# Patient Record
Sex: FEMALE | Race: WHITE | HISPANIC OR LATINO | Employment: OTHER | URBAN - METROPOLITAN AREA
[De-identification: names, ages, dates, MRNs, and addresses within clinical notes are randomized per-mention and may not be internally consistent; named-entity substitution may affect disease eponyms.]

---

## 2019-07-16 ENCOUNTER — HOSPITAL ENCOUNTER (EMERGENCY)
Facility: HOSPITAL | Age: 64
Discharge: HOME | End: 2019-07-16
Attending: EMERGENCY MEDICINE
Payer: MEDICARE

## 2019-07-16 VITALS
TEMPERATURE: 98.5 F | RESPIRATION RATE: 19 BRPM | WEIGHT: 145 LBS | HEART RATE: 74 BPM | OXYGEN SATURATION: 96 % | BODY MASS INDEX: 30.44 KG/M2 | DIASTOLIC BLOOD PRESSURE: 88 MMHG | SYSTOLIC BLOOD PRESSURE: 160 MMHG | HEIGHT: 58 IN

## 2019-07-16 DIAGNOSIS — M65.4 DE QUERVAIN'S TENOSYNOVITIS, LEFT: Primary | ICD-10-CM

## 2019-07-16 PROCEDURE — 99283 EMERGENCY DEPT VISIT LOW MDM: CPT | Performed by: EMERGENCY MEDICINE

## 2019-07-16 PROCEDURE — 99283 EMERGENCY DEPT VISIT LOW MDM: CPT

## 2019-07-16 RX ORDER — NAPROXEN 250 MG/1
250 TABLET ORAL ONCE
Status: COMPLETED | OUTPATIENT
Start: 2019-07-16 | End: 2019-07-16

## 2019-07-16 RX ORDER — NAPROXEN 500 MG/1
500 TABLET ORAL 2 TIMES DAILY WITH MEALS
Qty: 10 TABLET | Refills: 0 | Status: SHIPPED | OUTPATIENT
Start: 2019-07-16 | End: 2019-08-22

## 2019-07-16 RX ORDER — HYDROCODONE BITARTRATE AND ACETAMINOPHEN 5; 325 MG/1; MG/1
1 TABLET ORAL ONCE
Status: COMPLETED | OUTPATIENT
Start: 2019-07-16 | End: 2019-07-16

## 2019-07-16 RX ADMIN — HYDROCODONE BITARTRATE AND ACETAMINOPHEN 1 TABLET: 5; 325 TABLET ORAL at 13:01

## 2019-07-16 RX ADMIN — NAPROXEN 250 MG: 250 TABLET ORAL at 13:01

## 2019-07-16 NOTE — ED PROVIDER NOTES
History  Chief Complaint   Patient presents with    Arm Pain     Pt reports left arm pain for the past month  Denies initial injury she can recall  Pain from forearm into thumb  This is a 68-year-old female who presents with left arm pain  Her pain is in the pathway of the 1st extensor tendon  She has tenderness along the tendon path  She has decreased ability to give a thumbs-up  Her findings are concerning for De Quervain's tenosynovitis  She admits that she does a lot of repetitive motions      Arm Pain   Associated symptoms: no abdominal pain, no congestion, no cough, no diarrhea, no ear pain, no fatigue, no fever, no headaches, no myalgias, no nausea, no rhinorrhea, no shortness of breath, no sore throat, no vomiting and no wheezing        None       Past Medical History:   Diagnosis Date    Asthma     COPD [facility]chronic obstructive pulmonary disease) ([facility] )     Diabetes mellitus ([facility] )        History reviewed  No pertinent surgical history  History reviewed  No pertinent family history  I have reviewed and agree with the history as documented  Social History     Tobacco Use    Smoking status: Current Every Day Smoker     Types: Cigarettes    Smokeless tobacco: Never Used   Substance Use Topics    Alcohol use: Never     Frequency: Never    Drug use: Never        Review of Systems   Constitutional: Negative for activity change, fatigue and fever  HENT: Negative for congestion, ear pain, rhinorrhea and sore throat  Eyes: Negative  Respiratory: Negative for cough, shortness of breath and wheezing  Gastrointestinal: Negative for abdominal pain, diarrhea, nausea and vomiting  Endocrine: Negative  Genitourinary: Negative for difficulty urinating, dyspareunia, dysuria, flank pain, frequency, menstrual problem, pelvic pain, urgency, vaginal bleeding, vaginal discharge and vaginal pain  Musculoskeletal: Negative for arthralgias and myalgias     Skin: Negative for color change and pallor  Neurological: Negative for dizziness, speech difficulty, weakness and headaches  Hematological: Negative for adenopathy  Psychiatric/Behavioral: Negative for confusion  Physical Exam  Physical Exam   Constitutional: She is oriented to person, place, and time  She appears well-developed and well-nourished  She is cooperative  Non-toxic appearance  She does not have a sickly appearance  She does not appear ill  No distress  HENT:   Head: Normocephalic and atraumatic  Right Ear: Tympanic membrane and external ear normal    Left Ear: Tympanic membrane and external ear normal    Nose: No rhinorrhea, sinus tenderness or nasal deformity  No epistaxis  Right sinus exhibits no maxillary sinus tenderness and no frontal sinus tenderness  Left sinus exhibits no maxillary sinus tenderness and no frontal sinus tenderness  Mouth/Throat: Oropharynx is clear and moist and mucous membranes are normal  Normal dentition  Eyes: Pupils are equal, round, and reactive to light  EOM are normal    Neck: Normal range of motion  Neck supple  Cardiovascular: Normal rate, regular rhythm and normal heart sounds  No murmur heard  Pulmonary/Chest: Effort normal and breath sounds normal  No accessory muscle usage  No respiratory distress  She has no wheezes  She has no rales  She exhibits no tenderness  Abdominal: Soft  She exhibits no distension  There is no guarding  Musculoskeletal: Normal range of motion  She exhibits no edema  Left wrist: She exhibits tenderness (over the left first extensor tendon)  Lymphadenopathy:     She has no cervical adenopathy  Neurological: She is alert and oriented to person, place, and time  She exhibits normal muscle tone  Skin: Skin is warm and dry  No rash noted  No erythema  Psychiatric: She has a normal mood and affect  Nursing note and vitals reviewed        Vital Signs  ED Triage Vitals [07/16/19 1046]   Temperature Pulse Respirations Blood Pressure SpO2 98 5 °F (36 9 °C) 95 16 147/68 98 %      Temp Source Heart Rate Source Patient Position - Orthostatic VS BP Location FiO2 (%)   Oral Monitor Sitting Left arm --      Pain Score       Worst Possible Pain           Vitals:    07/16/19 1046 07/16/19 1333   BP: 147/68 160/88   Pulse: 95 74   Patient Position - Orthostatic VS: Sitting Sitting         Visual Acuity      ED Medications  Medications   naproxen (NAPROSYN) tablet 250 mg (250 mg Oral Given 7/16/19 1301)   HYDROcodone-acetaminophen (NORCO) 5-325 mg per tablet 1 tablet (1 tablet Oral Given 7/16/19 1301)       Diagnostic Studies  Results Reviewed     None                 No orders to display              Procedures  Procedures       ED Course                               MDM  Number of Diagnoses or Management Options  De Quervain's tenosynovitis, left: new and requires workup  Diagnosis management comments: No need for imaging in the absence of trauma  This has been an ongoing problem slowly worsening  Follow up recommendations made  Patient Progress  Patient progress: stable      Disposition  Final diagnoses:   De Quervain's tenosynovitis, left     Time reflects when diagnosis was documented in both MDM as applicable and the Disposition within this note     Time User Action Codes Description Comment    7/16/2019  1:03 PM Michaela Paz Add [M65 4] De Quervain's tenosynovitis, left       ED Disposition     ED Disposition Condition Date/Time Comment    Discharge Stable Tue Jul 16, 2019  1:00 PM Johanne Men discharge to home/self care              Follow-up Information     Follow up With Specialties Details Why Contact Info    Sakshi Richards MD Orthopedic Surgery, Orthopedics Schedule an appointment as soon as possible for a visit  For Recheck [address] [phone]            Discharge Medication List as of 7/16/2019  1:05 PM      START taking these medications    Details   naproxen (NAPROSYN) 500 mg tablet Take 1 tablet (500 mg total) by mouth 2 (two) times a day with meals for 5 days, Starting Tue 7/16/2019, Until Sun 7/21/2019, Print               ED Provider  Electronically Signed by           Raimundo Garcia  07/16/19 4211

## 2019-08-22 ENCOUNTER — OFFICE VISIT (OUTPATIENT)
Dept: OBGYN CLINIC | Facility: CLINIC | Age: 64
End: 2019-08-22
Payer: MEDICARE

## 2019-08-22 VITALS
HEART RATE: 90 BPM | BODY MASS INDEX: 31.36 KG/M2 | WEIGHT: 149.4 LBS | SYSTOLIC BLOOD PRESSURE: 136 MMHG | HEIGHT: 58 IN | DIASTOLIC BLOOD PRESSURE: 82 MMHG

## 2019-08-22 DIAGNOSIS — M65.4 DE QUERVAIN'S TENOSYNOVITIS, LEFT: Primary | ICD-10-CM

## 2019-08-22 PROCEDURE — 99203 OFFICE O/P NEW LOW 30 MIN: CPT | Performed by: ORTHOPAEDIC SURGERY

## 2019-08-22 PROCEDURE — 20550 NJX 1 TENDON SHEATH/LIGAMENT: CPT | Performed by: ORTHOPAEDIC SURGERY

## 2019-08-22 RX ORDER — IBUPROFEN 800 MG/1
TABLET ORAL
COMMUNITY

## 2019-08-22 RX ORDER — LIDOCAINE HYDROCHLORIDE 10 MG/ML
0.5 INJECTION, SOLUTION INFILTRATION; PERINEURAL
Status: COMPLETED | OUTPATIENT
Start: 2019-08-22 | End: 2019-08-22

## 2019-08-22 RX ORDER — PREDNISONE 10 MG/1
1 TABLET ORAL 2 TIMES DAILY
COMMUNITY

## 2019-08-22 RX ORDER — LEVOFLOXACIN 500 MG/1
TABLET, FILM COATED ORAL
Status: ON HOLD | COMMUNITY
End: 2020-01-10 | Stop reason: ALTCHOICE

## 2019-08-22 RX ORDER — LEVOCETIRIZINE DIHYDROCHLORIDE 5 MG/1
TABLET, FILM COATED ORAL
COMMUNITY
Start: 2018-08-23

## 2019-08-22 RX ORDER — MONTELUKAST SODIUM 10 MG/1
TABLET ORAL
COMMUNITY
Start: 2019-06-06

## 2019-08-22 RX ORDER — INSULIN ASPART 100 [IU]/ML
INJECTION, SUSPENSION SUBCUTANEOUS
COMMUNITY

## 2019-08-22 RX ORDER — ALBUTEROL SULFATE 90 MCG
HFA AEROSOL WITH ADAPTER (GRAM) INHALATION
Refills: 0 | COMMUNITY
Start: 2019-07-31

## 2019-08-22 RX ORDER — BUDESONIDE AND FORMOTEROL FUMARATE DIHYDRATE 160; 4.5 UG/1; UG/1
AEROSOL RESPIRATORY (INHALATION)
Refills: 0 | COMMUNITY
Start: 2019-07-22

## 2019-08-22 RX ORDER — TRIAMCINOLONE ACETONIDE 40 MG/ML
20 INJECTION, SUSPENSION INTRA-ARTICULAR; INTRAMUSCULAR
Status: COMPLETED | OUTPATIENT
Start: 2019-08-22 | End: 2019-08-22

## 2019-08-22 RX ADMIN — LIDOCAINE HYDROCHLORIDE 0.5 ML: 10 INJECTION, SOLUTION INFILTRATION; PERINEURAL at 09:24

## 2019-08-22 RX ADMIN — TRIAMCINOLONE ACETONIDE 20 MG: 40 INJECTION, SUSPENSION INTRA-ARTICULAR; INTRAMUSCULAR at 09:24

## 2019-08-22 NOTE — PROGRESS NOTES
CHIEF COMPLAINT:  Chief Complaint   Patient presents with    Left Hand - Pain       SUBJECTIVE:  Farrukh Lux is a 59y o  year old RHD female who presents to the office for evaluation of her left wrist  Pt states that she has been having pain on the radial aspect of her left wrist for greater than 2 months  Pt states that the pain increases with motion and lifting  Pt was seen in the ED on 7/16/19 where she was prescribed naproxen  Pt also has callus formation on the ulnar aspect of the long finger nail that has been present for about 1 month  Pt states that it causes her discomfort when she bumps it  Pt is insulin dependant diabetic  PAST MEDICAL HISTORY:  Past Medical History:   Diagnosis Date    Asthma     [facility]ronic [facility]     [facility]        PAST SURGICAL HISTORY:  No past surgical history on file  FAMILY HISTORY:  No family history on file  SOCIAL HISTORY:  Social History     Tobacco Use    Smoking status: Current Every Day Smoker     Types: Cigarettes    Smokeless tobacco: Never Used   Substance Use Topics    Alcohol use: Never     Frequency: Never    Drug use: Never       MEDICATIONS:  No current outpatient medications on file  ALLERGIES:  Allergies   Allergen Reactions    Cephalexin     Clindamycin Hives    Sulfamethoxazole-Trimethoprim Hives       REVIEW OF SYSTEMS:  Review of Systems   Constitutional: Negative for chills, fever and unexpected weight change  HENT: Negative for hearing loss, nosebleeds and sore throat  Eyes: Negative for pain, redness and visual disturbance  Respiratory: Negative for cough, shortness of breath and wheezing  Cardiovascular: Negative for chest pain, palpitations and leg swelling  Gastrointestinal: Negative for abdominal pain, nausea and vomiting  Endocrine: Negative for polydipsia and polyuria  Genitourinary: Negative for dysuria and hematuria  Skin: Negative for rash and wound  Neurological: Negative for dizziness, numbness and headaches  Psychiatric/Behavioral: Negative for decreased concentration, dysphoric mood and suicidal ideas  The patient is not nervous/anxious  VITALS:  There were no vitals filed for this visit  LABS:  HgA1c: No results found for: HGBA1C  BMP: No results found for: GLUCOSE, CALCIUM, NA, K, CO2, CL, BUN, CREATININE    _____________________________________________________  PHYSICAL EXAMINATION:  General: well developed and well nourished, alert, oriented times 3 and appears comfortable  Psychiatric: Normal  HEENT: Trachea Midline, No torticollis  Pulmonary: No audible wheezing or respiratory distress   Skin: No masses, erythema, lacerations, fluctation, ulcerations,   Neurovascular: Sensation Intact to the Median, Ulnar, Radial Nerve, Motor Intact to the Median, Ulnar, Radial Nerve and Pulses Intact    MUSCULOSKELETAL EXAMINATION:  De Quervain's Tenosynovitis Exam:  left side    Positive tender to palpation over 1st dorsal extensor compartment   Positive palpable nodule  Positive crepitus over 1st dorsal extensor compartment   Positive Finkelstein's test    Positive pain with resisted abduction of the thumb      ___________________________________________________  STUDIES REVIEWED:  No studies reviewed         PROCEDURES PERFORMED:  Hand/upper extremity injection: L extensor compartment 1  Date/Time: 8/22/2019 9:24 AM  Consent given by: patient  Site marked: site marked  Timeout: Immediately prior to procedure a time out was called to verify the correct patient, procedure, equipment, support staff and site/side marked as required   Supporting Documentation  Indications: pain   Procedure Details  Condition:de Quervain's tenosynovitis Site: L extensor compartment 1   Preparation: Patient was prepped and draped in the usual sterile fashion  Ultrasound guidance: no  Medications administered: 0 5 mL lidocaine 1 %; 20 mg triamcinolone acetonide 40 mg/mL    Patient tolerance: patient tolerated the procedure well with no immediate complications  Dressing:  Sterile dressing applied             _____________________________________________________  ASSESSMENT/PLAN:    Left deQuervain tenosynovitis   * CSI was offered and accepted by patient  * CSI was administered today without complications   * Pt was advised that the CSI could increase her blood glucose, she was advised to closely monitor her glucose levels  * Pt will follow up in the office in 2 weeks     Left long finger ulnar sided callus  at nail boarder  * Pt was advised to soak 2x a day and remove callus to work out likely hang nail         Follow Up:  Return in about 2 weeks (around 9/5/2019)  To Do Next Visit:  Re-evaluation of current issue    General Discussions:  Kayla Owens Tenosynovitis: The anatomy and physiology of de Quervain's tenosynovitis was discussed with the patient today in the office  Edema and increased contact pressure within the first dorsal extensor compartment at the radial styloid can cause pain, crepitation, and limitation of function  Treatment options include resting thumb spica splints to decrease edema, oral anti-inflammatory medications, home or formal therapy exercises, up to 2 steroid injections within the first dorsal extensor compartment, or surgical release  While majority of patients do respond to conservative treatment, up to 20% may require surgical release              Scribe Attestation    I,:   Ketan Tesfaye am acting as a scribe while in the presence of the attending physician :        I,:   Dorota Okeefe MD personally performed the services described in this documentation    as scribed in my presence :

## 2019-11-21 ENCOUNTER — OFFICE VISIT (OUTPATIENT)
Dept: OBGYN CLINIC | Facility: CLINIC | Age: 64
End: 2019-11-21
Payer: MEDICARE

## 2019-11-21 ENCOUNTER — APPOINTMENT (OUTPATIENT)
Dept: RADIOLOGY | Facility: CLINIC | Age: 64
End: 2019-11-21
Payer: MEDICARE

## 2019-11-21 VITALS
BODY MASS INDEX: 31.36 KG/M2 | DIASTOLIC BLOOD PRESSURE: 83 MMHG | WEIGHT: 149.4 LBS | HEART RATE: 90 BPM | HEIGHT: 58 IN | SYSTOLIC BLOOD PRESSURE: 146 MMHG

## 2019-11-21 DIAGNOSIS — M79.642 HAND PAIN, LEFT: ICD-10-CM

## 2019-11-21 DIAGNOSIS — M65.4 DE QUERVAIN'S TENOSYNOVITIS, LEFT: ICD-10-CM

## 2019-11-21 DIAGNOSIS — M79.642 HAND PAIN, LEFT: Primary | ICD-10-CM

## 2019-11-21 PROCEDURE — 99213 OFFICE O/P EST LOW 20 MIN: CPT | Performed by: ORTHOPAEDIC SURGERY

## 2019-11-21 PROCEDURE — 73130 X-RAY EXAM OF HAND: CPT

## 2019-11-21 RX ORDER — HYDROCODONE BITARTRATE AND ACETAMINOPHEN 5; 325 MG/1; MG/1
1 TABLET ORAL EVERY 6 HOURS PRN
Qty: 20 TABLET | Refills: 0 | Status: SHIPPED | OUTPATIENT
Start: 2019-11-21 | End: 2020-01-21

## 2019-11-21 NOTE — H&P
CHIEF COMPLAINT:  Chief Complaint   Patient presents with    Left Hand - Follow-up       SUBJECTIVE:  Tammie Lopez is a 59y o  year old RHD female who presents for follow-up regarding left hand pain  Patient states that the cortisone injection that was administered for left de Quervain tenosynovitis did not help back in August   She also is complaining of a cyst over the dorsal aspect of her wrist and over the distal radius  She states that they are painful  She would like to forego any cortisone injections at this time and would like to proceed with surgical intervention for release of her de Quervain tenosynovitis as well as removal of the cyst   She denies any numbness or tingling  The patient denies any cardiac  Patient has a history of COPD  Denies diabetes  Denies any history of MI, gastric ulcers, kidney or liver issues  Denies blood thinners  PAST MEDICAL HISTORY:  Past Medical History:   Diagnosis Date    Asthma     [facility]     Diabetes mellitus ([facility] )        PAST SURGICAL HISTORY:  History reviewed  No pertinent surgical history  FAMILY HISTORY:  History reviewed  No pertinent family history      SOCIAL HISTORY:  Social History     Tobacco Use    Smoking status: Current Every Day Smoker     Types: Cigarettes    Smokeless tobacco: Never Used   Substance Use Topics    Alcohol use: Never     Frequency: Never    Drug use: Never       MEDICATIONS:    Current Outpatient Medications:     benralizumab (FASENRA) subcutaneous injection, Inject 30 mg under the skin, Disp: , Rfl:     HYDROcodone-acetaminophen (NORCO) 5-325 mg per tablet, Take 1 tablet by mouth every 6 (six) hours as needed for painMax Daily Amount: 4 tablets, Disp: 20 tablet, Rfl: 0    ibuprofen (MOTRIN) 800 mg tablet, ibuprofen 800 mg tablet, Disp: , Rfl:     insulin aspart protamine-insulin aspart (NOVOLOG MIX 70/30) 100 units/mL injection, Novolog Mix 70-30 U-100 Insulin 100 unit/mL subcutaneous solution, Disp: , Rfl:     levocetirizine (XYZAL) 5 MG tablet, levocetirizine 5 mg tablet, Disp: , Rfl:     levofloxacin (LEVAQUIN) 500 mg tablet, levofloxacin 500 mg tablet, Disp: , Rfl:     liraglutide (VICTOZA) injection, Victoza 2-Jae 0 6 mg/0 1 mL (18 mg/3 mL) subcutaneous pen injector, Disp: , Rfl:     montelukast (SINGULAIR) 10 mg tablet, montelukast 10 mg tablet, Disp: , Rfl:     predniSONE 10 mg tablet, Take 1 tablet by mouth 2 (two) times a day, Disp: , Rfl:     PROVENTIL  (90 Base) MCG/ACT inhaler, , Disp: , Rfl: 0    SYMBICORT 160-4 5 MCG/ACT inhaler, , Disp: , Rfl: 0    ALLERGIES:  Allergies   Allergen Reactions    Cephalexin     Clindamycin Hives    Sulfamethoxazole-Trimethoprim Hives       REVIEW OF SYSTEMS:  Review of Systems  ROS:   General: no fever, no chills  HEENT:  No loss of hearing or eyesight problems  Eyes:  No red eyes  Respiratory:  No coughing, shortness of breath or wheezing  Cardiovascular:  No chest pain, no palpitations  GI:  Abdomen soft nontender, denies nausea  Endocrine:  No muscle weakness, no frequent urination, no excessive thirst  Urinary:  No dysuria, no incontinence  Musculoskeletal: see HPI and PE  SKIN:  No skin rash, no dry skin  Neurological:  No headaches, no confusion  Psychiatric:  No suicide thoughts, no anxiety, no depression  Review of all other systems is negative    VITALS:  Vitals:    11/21/19 1109   BP: 146/83   Pulse: 90       LABS:  HgA1c: No results found for: HGBA1C  BMP: No results found for: GLUCOSE, CALCIUM, NA, K, CO2, CL, BUN, CREATININE    _____________________________________________________  PHYSICAL EXAMINATION:  General: well developed and well nourished, alert, oriented times 3 and appears comfortable  Psychiatric: Normal  HEENT: Trachea Midline, No torticollis  Heart:  Regular rate and rhythm  Lungs:   Audible wheezes appreciated on expiration diffusely throughout  Pulmonary: No audible wheezing or respiratory distress   Skin: No masses, erythema, lacerations, fluctation, ulcerations  Neurovascular: Sensation Intact to the Median, Ulnar, Radial Nerve, Motor Intact to the Median, Ulnar, Radial Nerve and Pulses Intact    MUSCULOSKELETAL EXAMINATION:  De Quervain's Tenosynovitis Exam:  left side    Positive tender to palpation over 1st dorsal extensor compartment   Positive palpable nodule  Positive crepitus over 1st dorsal extensor compartment   Positive Finkelstein's test    Positive pain with resisted abduction of the thumb    Cyst is appreciated over the dorsal aspect of the wrist    ___________________________________________________  STUDIES REVIEWED:  No studies reviewed  PROCEDURES PERFORMED:  Procedures  No Procedures performed today    _____________________________________________________  ASSESSMENT/PLAN:    Left de Quervain tenosynovitis, left wrist dorsal ganglion cyst, left distal radius cyst  - patient would like to forego any type of cortisone injections at this time  She would like to proceed with surgical intervention for removal of her cyst as well as de Quervain tenosynovitis release  Detail consent was obtained today   Surgery: left de Quervain release, left dorsal wrist ganglion cyst excision, left distal radius cyst excision   Anesthesia:  IV sedation   Antibiotics:  None   Medical clearance: needed: Pulmonary   Hand therapy: ordered   Consent: obtained   Post op pain medication sent to pharmacy today    Surgery medication instructions: You will stop eating and drinking at midnight the night before your surgery, but you may continue to take your normal medications with a small sip of water      In the morning on the day of your surgery, we would like you to take the following medications (as long as you have never been told to avoid Tylenol or NSAIDs like ibuprofen, Naproxen, Aleve, Advil, etc):   Ibuprofen 600mg one tablet by mouth   Tylenol 500mg one tablet by mouth    After surgery, we would like you to take Ibuprofen 600mg one tablet by mouth every 6 hours with food (at breakfast, lunch and dinner)  AND Tylenol 500 mg one tablet by mouth every 6 hours  (at breakfast, lunch and dinner) for 5-7 days after your surgery  Please take these medication EVERYDAY after surgery for 5-7 days, and not just as needed  You can take these medications at the same time  Taking these medications after surgery will limit your need for prescription pain medication  We will also prescribe a narcotic pain medication for a limited time after surgery that you can take as needed for moderate or severe pain  Diagnoses and all orders for this visit:    Hand pain, left  -     XR hand 3+ vw left; Future    De Quervain's tenosynovitis, left  -     Ambulatory referral to PT/OT hand therapy; Future  -     HYDROcodone-acetaminophen (NORCO) 5-325 mg per tablet; Take 1 tablet by mouth every 6 (six) hours as needed for painMax Daily Amount: 4 tablets  -     Case request operating room: RELEASE DEQUERVAINS left, Left dorsal wrist ganglion cyst excision, left distal radius cyst excision; Standing    Other orders  -     Diet NPO; Sips with meds; Standing  -     Height and weight upon arrival; Standing  -     Void on call to OR; Standing  -     Insert peripheral IV; Standing        Follow Up:  Return for post op   Work/school status:  No restrictions    To Do Next Visit:  Re-evaluation of current issue and Sutures out    Operative Discussions:  Luly Singh Release: The anatomy and physiology of de Quervain's tenosynovitis was discussed with the patient today in the office  Edema and increased contact pressure within the first dorsal extensor compartment at the radial styloid can cause pain, crepitation, and limitation of function    Treatment options include resting thumb spica splints to decrease edema, oral anti-inflammatory medications, home or formal therapy exercises, up to 2 steroid injections within the first dorsal extensor compartment, or surgical release  While majority of patients do respond to conservative treatment, up to 20% may require surgical release  The patient has elected to undergo a release of the first dorsal extensor compartment (de Quervain's)  A small incision will be made over the radial styloid of the wrist, the tendon sheath holding the abductor pollicis longus and extensor pollicis brevis will be released  In the postoperative period, light activities are allowed immediately, driving is allowed when narcotic medication has stopped, and the incision may get wet after 2 days  Heavy activities (lifting more than approximately 10 pounds) will be allowed after the follow up appointment in 1-2 weeks  While the pain and discomfort within the wrist typically improves rapidly, some residual discomfort may be present for up to 6 weeks  The patient may notice temporary numbness within the superficial sensory branch of the radial nerve secondary to a traction neuropraxia  This is often a self-limiting condition  The patient has an understanding of the above mentioned discussion  Approximate success rate is 98%  The risks and benefits of the procedure were explained to the patient, which include, but are not limited to: Bleeding, infection, recurrence, pain, scar, damage to tendons, damage to nerves, and damage to blood vessels, failure to give desired results and complications related to anesthesia  These risks, along with alternative conservative treatment options, and postoperative protocols were voiced back and understood by the patient  All questions were answered to the patient's satisfaction  The patient agrees to comply with a standard postoperative protocol, and is willing to proceed  Education was provided via written and auditory forms  There were no barriers to learning   Written handouts regarding wound care, incision and scar care, and general preoperative information was provided to the patient  Prior to surgery, the patient may be requested to stop all anti-inflammatory medications  Prophylactic aspirin, Plavix, and Coumadin may be allowed to be continued  Medications including vitamin E , ginkgo, and fish oil are requested to be stopped approximately one week prior to surgery  Hypertensive medications and beta blockers, if taken, should be continued  and Ganglion Cyst Excision: The anatomy and physiology of the ganglion was discussed with the patient today in the office  Fluid leaking out of the joint surface typically creates a small sac, which can enlarge and cause pain or limitation of motion  Treatment options include observation, aspiration, or surgical incision were discussed with the patient today  Observation typically lead to resolution and approximately 10% of patients, aspiration least resolution approximately 50% of patients, and surgical excision lead to resolution in approximately 97% of patients  After discussion with the patient today, the patient voiced understanding of all treatment options  The patient has elected excision of the ganglion  The risks and benefits of the procedure were explained to the patient, which include, but are not limited to: Bleeding, infection, recurrence, pain, scar, damage to tendons, damage to nerves, and damage to blood vessels, failure to give desired results and complications related to anesthesia  These risks, along with alternative conservative treatment options, and postoperative protocols were voiced back and understood by the patient  All questions were answered to the patient's satisfaction  The patient agrees to comply with a standard postoperative protocol, and is willing to proceed  Education was provided via written and auditory forms  There were no barriers to learning   Written handouts regarding wound care, incision and scar care, and general preoperative information was provided to the patient  Prior to surgery, the patient may be requested to stop all anti-inflammatory medications  Prophylactic aspirin, Plavix, and Coumadin may be allowed to be continued  Medications including vitamin E , ginkgo, and fish oil are requested to be stopped approximately one week prior to surgery  Hypertensive medications and beta blockers, if taken, should be continued      Scribe Attestation    I,:   Kaia Hanley PA-C am acting as a scribe while in the presence of the attending physician :        I,:   Rudy Hylton MD personally performed the services described in this documentation    as scribed in my presence :

## 2019-11-21 NOTE — PROGRESS NOTES
CHIEF COMPLAINT:  Chief Complaint   Patient presents with    Left Hand - Follow-up       SUBJECTIVE:  Jerardo Oliva is a 59y o  year old RHD female who presents for follow-up regarding left hand pain  Patient states that the cortisone injection that was administered for left de Quervain tenosynovitis did not help back in August   She also is complaining of a cyst over the dorsal aspect of her wrist and over the distal radius  She states that they are painful  She would like to forego any cortisone injections at this time and would like to proceed with surgical intervention for release of her de Quervain tenosynovitis as well as removal of the cyst   She denies any numbness or tingling  The patient denies any cardiac  Patient has a history of COPD  Denies diabetes  Denies any history of MI, gastric ulcers, kidney or liver issues  Denies blood thinners  PAST MEDICAL HISTORY:  Past Medical History:   Diagnosis Date    Asthma     [facility]     Diabetes mellitus ([facility] )        PAST SURGICAL HISTORY:  History reviewed  No pertinent surgical history  FAMILY HISTORY:  History reviewed  No pertinent family history      SOCIAL HISTORY:  Social History     Tobacco Use    Smoking status: Current Every Day Smoker     Types: Cigarettes    Smokeless tobacco: Never Used   Substance Use Topics    Alcohol use: Never     Frequency: Never    Drug use: Never       MEDICATIONS:    Current Outpatient Medications:     benralizumab (FASENRA) subcutaneous injection, Inject 30 mg under the skin, Disp: , Rfl:     HYDROcodone-acetaminophen (NORCO) 5-325 mg per tablet, Take 1 tablet by mouth every 6 (six) hours as needed for painMax Daily Amount: 4 tablets, Disp: 20 tablet, Rfl: 0    ibuprofen (MOTRIN) 800 mg tablet, ibuprofen 800 mg tablet, Disp: , Rfl:     insulin aspart protamine-insulin aspart (NOVOLOG MIX 70/30) 100 units/mL injection, Novolog Mix 70-30 U-100 Insulin 100 unit/mL subcutaneous solution, Disp: , Rfl:     levocetirizine (XYZAL) 5 MG tablet, levocetirizine 5 mg tablet, Disp: , Rfl:     levofloxacin (LEVAQUIN) 500 mg tablet, levofloxacin 500 mg tablet, Disp: , Rfl:     liraglutide (VICTOZA) injection, Victoza 2-Jae 0 6 mg/0 1 mL (18 mg/3 mL) subcutaneous pen injector, Disp: , Rfl:     montelukast (SINGULAIR) 10 mg tablet, montelukast 10 mg tablet, Disp: , Rfl:     predniSONE 10 mg tablet, Take 1 tablet by mouth 2 (two) times a day, Disp: , Rfl:     PROVENTIL  (90 Base) MCG/ACT inhaler, , Disp: , Rfl: 0    SYMBICORT 160-4 5 MCG/ACT inhaler, , Disp: , Rfl: 0    ALLERGIES:  Allergies   Allergen Reactions    Cephalexin     Clindamycin Hives    Sulfamethoxazole-Trimethoprim Hives       REVIEW OF SYSTEMS:  Review of Systems  ROS:   General: no fever, no chills  HEENT:  No loss of hearing or eyesight problems  Eyes:  No red eyes  Respiratory:  No coughing, shortness of breath or wheezing  Cardiovascular:  No chest pain, no palpitations  GI:  Abdomen soft nontender, denies nausea  Endocrine:  No muscle weakness, no frequent urination, no excessive thirst  Urinary:  No dysuria, no incontinence  Musculoskeletal: see HPI and PE  SKIN:  No skin rash, no dry skin  Neurological:  No headaches, no confusion  Psychiatric:  No suicide thoughts, no anxiety, no depression  Review of all other systems is negative    VITALS:  Vitals:    11/21/19 1109   BP: 146/83   Pulse: 90       LABS:  HgA1c: No results found for: HGBA1C  BMP: No results found for: GLUCOSE, CALCIUM, NA, K, CO2, CL, BUN, CREATININE    _____________________________________________________  PHYSICAL EXAMINATION:  General: well developed and well nourished, alert, oriented times 3 and appears comfortable  Psychiatric: Normal  HEENT: Trachea Midline, No torticollis  Heart:  Regular rate and rhythm  Lungs:   Audible wheezes appreciated on expiration diffusely throughout  Pulmonary: No audible wheezing or respiratory distress   Skin: No masses, erythema, lacerations, fluctation, ulcerations  Neurovascular: Sensation Intact to the Median, Ulnar, Radial Nerve, Motor Intact to the Median, Ulnar, Radial Nerve and Pulses Intact    MUSCULOSKELETAL EXAMINATION:  De Quervain's Tenosynovitis Exam:  left side    Positive tender to palpation over 1st dorsal extensor compartment   Positive palpable nodule  Positive crepitus over 1st dorsal extensor compartment   Positive Finkelstein's test    Positive pain with resisted abduction of the thumb    Cyst is appreciated over the dorsal aspect of the wrist    ___________________________________________________  STUDIES REVIEWED:  No studies reviewed  PROCEDURES PERFORMED:  Procedures  No Procedures performed today    _____________________________________________________  ASSESSMENT/PLAN:    Left de Quervain tenosynovitis, left wrist dorsal ganglion cyst, left distal radius cyst  - patient would like to forego any type of cortisone injections at this time  She would like to proceed with surgical intervention for removal of her cyst as well as de Quervain tenosynovitis release  Detail consent was obtained today   Surgery: left de Quervain release, left dorsal wrist ganglion cyst excision, left distal radius cyst excision   Anesthesia:  IV sedation   Antibiotics:  None   Medical clearance: needed: Pulmonary   Hand therapy: ordered   Consent: obtained   Post op pain medication sent to pharmacy today    Surgery medication instructions: You will stop eating and drinking at midnight the night before your surgery, but you may continue to take your normal medications with a small sip of water      In the morning on the day of your surgery, we would like you to take the following medications (as long as you have never been told to avoid Tylenol or NSAIDs like ibuprofen, Naproxen, Aleve, Advil, etc):   Ibuprofen 600mg one tablet by mouth   Tylenol 500mg one tablet by mouth    After surgery, we would like you to take Ibuprofen 600mg one tablet by mouth every 6 hours with food (at breakfast, lunch and dinner)  AND Tylenol 500 mg one tablet by mouth every 6 hours  (at breakfast, lunch and dinner) for 5-7 days after your surgery  Please take these medication EVERYDAY after surgery for 5-7 days, and not just as needed  You can take these medications at the same time  Taking these medications after surgery will limit your need for prescription pain medication  We will also prescribe a narcotic pain medication for a limited time after surgery that you can take as needed for moderate or severe pain  Diagnoses and all orders for this visit:    Hand pain, left  -     XR hand 3+ vw left; Future    De Quervain's tenosynovitis, left  -     Ambulatory referral to PT/OT hand therapy; Future  -     HYDROcodone-acetaminophen (NORCO) 5-325 mg per tablet; Take 1 tablet by mouth every 6 (six) hours as needed for painMax Daily Amount: 4 tablets  -     Case request operating room: RELEASE DEQUERVAINS left, Left dorsal wrist ganglion cyst excision, left distal radius cyst excision; Standing    Other orders  -     Diet NPO; Sips with meds; Standing  -     Height and weight upon arrival; Standing  -     Void on call to OR; Standing  -     Insert peripheral IV; Standing        Follow Up:  Return for post op   Work/school status:  No restrictions    To Do Next Visit:  Re-evaluation of current issue and Sutures out    Operative Discussions:  Jil Penaloza Release: The anatomy and physiology of de Quervain's tenosynovitis was discussed with the patient today in the office  Edema and increased contact pressure within the first dorsal extensor compartment at the radial styloid can cause pain, crepitation, and limitation of function    Treatment options include resting thumb spica splints to decrease edema, oral anti-inflammatory medications, home or formal therapy exercises, up to 2 steroid injections within the first dorsal extensor compartment, or surgical release  While majority of patients do respond to conservative treatment, up to 20% may require surgical release  The patient has elected to undergo a release of the first dorsal extensor compartment (de Quervain's)  A small incision will be made over the radial styloid of the wrist, the tendon sheath holding the abductor pollicis longus and extensor pollicis brevis will be released  In the postoperative period, light activities are allowed immediately, driving is allowed when narcotic medication has stopped, and the incision may get wet after 2 days  Heavy activities (lifting more than approximately 10 pounds) will be allowed after the follow up appointment in 1-2 weeks  While the pain and discomfort within the wrist typically improves rapidly, some residual discomfort may be present for up to 6 weeks  The patient may notice temporary numbness within the superficial sensory branch of the radial nerve secondary to a traction neuropraxia  This is often a self-limiting condition  The patient has an understanding of the above mentioned discussion  Approximate success rate is 98%  The risks and benefits of the procedure were explained to the patient, which include, but are not limited to: Bleeding, infection, recurrence, pain, scar, damage to tendons, damage to nerves, and damage to blood vessels, failure to give desired results and complications related to anesthesia  These risks, along with alternative conservative treatment options, and postoperative protocols were voiced back and understood by the patient  All questions were answered to the patient's satisfaction  The patient agrees to comply with a standard postoperative protocol, and is willing to proceed  Education was provided via written and auditory forms  There were no barriers to learning   Written handouts regarding wound care, incision and scar care, and general preoperative information was provided to the patient  Prior to surgery, the patient may be requested to stop all anti-inflammatory medications  Prophylactic aspirin, Plavix, and Coumadin may be allowed to be continued  Medications including vitamin E , ginkgo, and fish oil are requested to be stopped approximately one week prior to surgery  Hypertensive medications and beta blockers, if taken, should be continued  and Ganglion Cyst Excision: The anatomy and physiology of the ganglion was discussed with the patient today in the office  Fluid leaking out of the joint surface typically creates a small sac, which can enlarge and cause pain or limitation of motion  Treatment options include observation, aspiration, or surgical incision were discussed with the patient today  Observation typically lead to resolution and approximately 10% of patients, aspiration least resolution approximately 50% of patients, and surgical excision lead to resolution in approximately 97% of patients  After discussion with the patient today, the patient voiced understanding of all treatment options  The patient has elected excision of the ganglion  The risks and benefits of the procedure were explained to the patient, which include, but are not limited to: Bleeding, infection, recurrence, pain, scar, damage to tendons, damage to nerves, and damage to blood vessels, failure to give desired results and complications related to anesthesia  These risks, along with alternative conservative treatment options, and postoperative protocols were voiced back and understood by the patient  All questions were answered to the patient's satisfaction  The patient agrees to comply with a standard postoperative protocol, and is willing to proceed  Education was provided via written and auditory forms  There were no barriers to learning   Written handouts regarding wound care, incision and scar care, and general preoperative information was provided to the patient  Prior to surgery, the patient may be requested to stop all anti-inflammatory medications  Prophylactic aspirin, Plavix, and Coumadin may be allowed to be continued  Medications including vitamin E , ginkgo, and fish oil are requested to be stopped approximately one week prior to surgery  Hypertensive medications and beta blockers, if taken, should be continued      Scribe Attestation    I,:   Анна Montero PA-C am acting as a scribe while in the presence of the attending physician :        I,:   Nahum Barnard MD personally performed the services described in this documentation    as scribed in my presence :

## 2019-12-23 NOTE — PRE-PROCEDURE INSTRUCTIONS
Pre-Surgery Instructions:   Medication Instructions    benralizumab (FASENRA) subcutaneous injection Instructed patient per Anesthesia Guidelines   insulin aspart protamine-insulin aspart (NOVOLOG MIX 70/30) 100 units/mL injection Pt instructed to hold morning of surgery    levocetirizine (XYZAL) 5 MG tablet Instructed patient per Anesthesia Guidelines   liraglutide (VICTOZA) injection Pt instructed to hold morning of surgery    montelukast (SINGULAIR) 10 mg tablet Instructed patient per Anesthesia Guidelines   predniSONE 10 mg tablet Instructed patient per Anesthesia Guidelines   PROVENTIL  (90 Base) MCG/ACT inhaler Instructed patient per Anesthesia Guidelines   SYMBICORT 160-4 5 MCG/ACT inhaler Instructed patient per Anesthesia Guidelines

## 2020-01-03 ENCOUNTER — ANESTHESIA EVENT (OUTPATIENT)
Dept: PERIOP | Facility: HOSPITAL | Age: 65
End: 2020-01-03
Payer: MEDICARE

## 2020-01-07 ENCOUNTER — OFFICE VISIT (OUTPATIENT)
Dept: OBGYN CLINIC | Facility: CLINIC | Age: 65
End: 2020-01-07
Payer: MEDICARE

## 2020-01-07 VITALS
SYSTOLIC BLOOD PRESSURE: 142 MMHG | WEIGHT: 149.4 LBS | HEART RATE: 91 BPM | HEIGHT: 58 IN | BODY MASS INDEX: 31.36 KG/M2 | DIASTOLIC BLOOD PRESSURE: 84 MMHG

## 2020-01-07 DIAGNOSIS — M65.4 DE QUERVAIN'S TENOSYNOVITIS, LEFT: Primary | ICD-10-CM

## 2020-01-07 PROCEDURE — 99213 OFFICE O/P EST LOW 20 MIN: CPT | Performed by: ORTHOPAEDIC SURGERY

## 2020-01-07 NOTE — H&P
CHIEF COMPLAINT:  Chief Complaint   Patient presents with    Left Wrist - Follow-up       SUBJECTIVE:  Daljit Goetz is a 59y o  year old RHD female hand pain  Patient states that the cortisone injection that was administered for left de Quervain tenosynovitis tonight help back in August   She is also complaining of a cyst over the dorsal aspect of her wrist and over the distal radius  She states that there painful  She would like to forego any cortisone injection at this time and she would like to proceed with surgical intervention for release of her de Quervain tenosynovitis as well as removal of cyst   She denies any numbness or tingling  The patient denies any cardiac  Patient has a history of COPD  Denies diabetes  Denies any history of MI, gastric ulcers, kidney or liver issues  Denies blood thinners  PAST MEDICAL HISTORY:  Past Medical History:   Diagnosis Date    Asthma     [facility]     Diabetes mellitus ([facility] )        PAST SURGICAL HISTORY:  Past Surgical History:   Procedure Laterality Date    HERNIA REPAIR         FAMILY HISTORY:  History reviewed  No pertinent family history      SOCIAL HISTORY:  Social History     Tobacco Use    Smoking status: Former Smoker     Types: Cigarettes     Last attempt to quit: 2015     Years since quittin 0    Smokeless tobacco: Never Used   Substance Use Topics    Alcohol use: Never     Frequency: Never    Drug use: Never       MEDICATIONS:    Current Outpatient Medications:     benralizumab (FASENRA) subcutaneous injection, Inject 30 mg under the skin, Disp: , Rfl:     HYDROcodone-acetaminophen (NORCO) 5-325 mg per tablet, Take 1 tablet by mouth every 6 (six) hours as needed for painMax Daily Amount: 4 tablets, Disp: 20 tablet, Rfl: 0    ibuprofen (MOTRIN) 800 mg tablet, ibuprofen 800 mg tablet, Disp: , Rfl:     insulin aspart protamine-insulin aspart (NOVOLOG MIX 70/30) 100 units/mL injection, Novolog Mix 70-30 U-100 Insulin 100 unit/mL subcutaneous solution, Disp: , Rfl:     levocetirizine (XYZAL) 5 MG tablet, levocetirizine 5 mg tablet, Disp: , Rfl:     levofloxacin (LEVAQUIN) 500 mg tablet, levofloxacin 500 mg tablet, Disp: , Rfl:     liraglutide (VICTOZA) injection, Victoza 2-Jae 0 6 mg/0 1 mL (18 mg/3 mL) subcutaneous pen injector, Disp: , Rfl:     montelukast (SINGULAIR) 10 mg tablet, montelukast 10 mg tablet, Disp: , Rfl:     predniSONE 10 mg tablet, Take 1 tablet by mouth 2 (two) times a day, Disp: , Rfl:     PROVENTIL  (90 Base) MCG/ACT inhaler, , Disp: , Rfl: 0    SYMBICORT 160-4 5 MCG/ACT inhaler, , Disp: , Rfl: 0    ALLERGIES:  Allergies   Allergen Reactions    Cephalexin     Clindamycin Hives    Sulfamethoxazole-Trimethoprim Hives       REVIEW OF SYSTEMS:  Review of Systems  ROS:      General: no fever, no chills  HEENT:  No loss of hearing or eyesight problems  Eyes:  No red eyes  Respiratory:  No coughing, shortness of breath or wheezing  Cardiovascular:  No chest pain, no palpitations  GI:  Abdomen soft nontender, denies nausea  Endocrine:  No muscle weakness, no frequent urination, no excessive thirst  Urinary:  No dysuria, no incontinence  Musculoskeletal: see HPI and PE  SKIN:  No skin rash, no dry skin  Neurological:  No headaches, no confusion  Psychiatric:  No suicide thoughts, no anxiety, no depression  Review of all other systems is negative    VITALS:  Vitals:    01/07/20 1114   BP: 142/84   Pulse: 91       LABS:  HgA1c: No results found for: HGBA1C  BMP: No results found for: GLUCOSE, CALCIUM, NA, K, CO2, CL, BUN, CREATININE    _____________________________________________________  PHYSICAL EXAMINATION:  General: well developed and well nourished, alert, oriented times 3 and appears comfortable  Psychiatric: Normal  HEENT: Trachea Midline, No torticollis  Heart:  Regular rate and rhythm  Lungs:   Audible wheezes appreciated on expiration diffusely throughout  Pulmonary: No audible wheezing or respiratory distress   Skin: No masses, erythema, lacerations, fluctation, ulcerations  Neurovascular: Sensation Intact to the Median, Ulnar, Radial Nerve, Motor Intact to the Median, Ulnar, Radial Nerve and Pulses Intact    MUSCULOSKELETAL EXAMINATION:  De Quervain's Tenosynovitis Exam:  left side    Positive tender to palpation over 1st dorsal extensor compartment   Positive palpable nodule  Positive crepitus over 1st dorsal extensor compartment   Positive Finkelstein's test    Positive pain with resisted abduction of the thumb      Cyst is appreciated over the dorsal aspect of the wrist    ___________________________________________________  STUDIES REVIEWED:  No studies reviewed  PROCEDURES PERFORMED:  Procedures  No Procedures performed today    _____________________________________________________  ASSESSMENT/PLAN:    Left de Quervain tenosynovitis, left wrist dorsal ganglion cyst, left distal radius cyst  - patient would like to forego any type of cortisone injections at this time  She would like to proceed with surgical intervention for removal of her cyst as well as de Quervain tenosynovitis release  Detail consent was obtained today   Surgery: left de Quervain release, left dorsal wrist ganglion cyst excision, left distal radius cyst excision   Anesthesia:  IV sedation   Antibiotics:  None   Medical clearance: needed: Pulmonary   Hand therapy: ordered   Consent: obtained   Post op pain medication sent to pharmacy today    Surgery medication instructions: You will stop eating and drinking at midnight the night before your surgery, but you may continue to take your normal medications with a small sip of water      In the morning on the day of your surgery, we would like you to take the following medications (as long as you have never been told to avoid Tylenol or NSAIDs like ibuprofen, Naproxen, Aleve, Advil, etc):   Ibuprofen 600mg one tablet by mouth   Tylenol 500mg one tablet by mouth    After surgery, we would like you to take Ibuprofen 600mg one tablet by mouth every 6 hours with food (at breakfast, lunch and dinner)  AND Tylenol 500 mg one tablet by mouth every 6 hours  (at breakfast, lunch and dinner) for 5-7 days after your surgery  Please take these medication EVERYDAY after surgery for 5-7 days, and not just as needed  You can take these medications at the same time  Taking these medications after surgery will limit your need for prescription pain medication  We will also prescribe a narcotic pain medication for a limited time after surgery that you can take as needed for moderate or severe pain  De Quervain's tenosynovitis, left  -     Ambulatory referral to PT/OT hand therapy; Future  -     HYDROcodone-acetaminophen (NORCO) 5-325 mg per tablet; Take 1 tablet by mouth every 6 (six) hours as needed for painMax Daily Amount: 4 tablets  -     Case request operating room: RELEASE DEQUERVAINS left, Left dorsal wrist ganglion cyst excision, left distal radius cyst excision; Standing     Other orders  -     Diet NPO; Sips with meds; Standing  -     Height and weight upon arrival; Standing  -     Void on call to OR; Standing  -     Insert peripheral IV; Standing      Follow Up:  Return for post op   Work/school status:  No restrictions    To Do Next Visit:  Re-evaluation of current issue and Sutures out    Operative Discussions:  Christie Abdalla Release: The anatomy and physiology of de Quervain's tenosynovitis was discussed with the patient today in the office  Edema and increased contact pressure within the first dorsal extensor compartment at the radial styloid can cause pain, crepitation, and limitation of function    Treatment options include resting thumb spica splints to decrease edema, oral anti-inflammatory medications, home or formal therapy exercises, up to 2 steroid injections within the first dorsal extensor compartment, or surgical release  While majority of patients do respond to conservative treatment, up to 20% may require surgical release  The patient has elected to undergo a release of the first dorsal extensor compartment (de Quervain's)  A small incision will be made over the radial styloid of the wrist, the tendon sheath holding the abductor pollicis longus and extensor pollicis brevis will be released  In the postoperative period, light activities are allowed immediately, driving is allowed when narcotic medication has stopped, and the incision may get wet after 2 days  Heavy activities (lifting more than approximately 10 pounds) will be allowed after the follow up appointment in 1-2 weeks  While the pain and discomfort within the wrist typically improves rapidly, some residual discomfort may be present for up to 6 weeks  The patient may notice temporary numbness within the superficial sensory branch of the radial nerve secondary to a traction neuropraxia  This is often a self-limiting condition  The patient has an understanding of the above mentioned discussion  Approximate success rate is 98%  The risks and benefits of the procedure were explained to the patient, which include, but are not limited to: Bleeding, infection, recurrence, pain, scar, damage to tendons, damage to nerves, and damage to blood vessels, failure to give desired results and complications related to anesthesia  These risks, along with alternative conservative treatment options, and postoperative protocols were voiced back and understood by the patient  All questions were answered to the patient's satisfaction  The patient agrees to comply with a standard postoperative protocol, and is willing to proceed  Education was provided via written and auditory forms  There were no barriers to learning  Written handouts regarding wound care, incision and scar care, and general preoperative information was provided to the patient    Prior to surgery, the patient may be requested to stop all anti-inflammatory medications  Prophylactic aspirin, Plavix, and Coumadin may be allowed to be continued  Medications including vitamin E , ginkgo, and fish oil are requested to be stopped approximately one week prior to surgery  Hypertensive medications and beta blockers, if taken, should be continued  and Ganglion Cyst Excision: The anatomy and physiology of the ganglion was discussed with the patient today in the office  Fluid leaking out of the joint surface typically creates a small sac, which can enlarge and cause pain or limitation of motion  Treatment options include observation, aspiration, or surgical incision were discussed with the patient today  Observation typically lead to resolution and approximately 10% of patients, aspiration least resolution approximately 50% of patients, and surgical excision lead to resolution in approximately 97% of patients  After discussion with the patient today, the patient voiced understanding of all treatment options  The patient has elected excision of the ganglion  The risks and benefits of the procedure were explained to the patient, which include, but are not limited to: Bleeding, infection, recurrence, pain, scar, damage to tendons, damage to nerves, and damage to blood vessels, failure to give desired results and complications related to anesthesia  These risks, along with alternative conservative treatment options, and postoperative protocols were voiced back and understood by the patient  All questions were answered to the patient's satisfaction  The patient agrees to comply with a standard postoperative protocol, and is willing to proceed  Education was provided via written and auditory forms  There were no barriers to learning  Written handouts regarding wound care, incision and scar care, and general preoperative information was provided to the patient    Prior to surgery, the patient may be requested to stop all anti-inflammatory medications  Prophylactic aspirin, Plavix, and Coumadin may be allowed to be continued  Medications including vitamin E , ginkgo, and fish oil are requested to be stopped approximately one week prior to surgery  Hypertensive medications and beta blockers, if taken, should be continued      Scribe Attestation    I,:   Yasmeen Rojas PA-C am acting as a scribe while in the presence of the attending physician :        I,:   Jose Celis MD personally performed the services described in this documentation    as scribed in my presence :

## 2020-01-07 NOTE — H&P (VIEW-ONLY)
CHIEF COMPLAINT:  Chief Complaint   Patient presents with    Left Wrist - Follow-up       SUBJECTIVE:  Josephine Silva is a 59y o  year old RHD female hand pain  Patient states that the cortisone injection that was administered for left de Quervain tenosynovitis tonight help back in August   She is also complaining of a cyst over the dorsal aspect of her wrist and over the distal radius  She states that there painful  She would like to forego any cortisone injection at this time and she would like to proceed with surgical intervention for release of her de Quervain tenosynovitis as well as removal of cyst   She denies any numbness or tingling  The patient denies any cardiac  Patient has a history of COPD  Denies diabetes  Denies any history of MI, gastric ulcers, kidney or liver issues  Denies blood thinners  PAST MEDICAL HISTORY:  Past Medical History:   Diagnosis Date    Asthma     [facility]     Diabetes mellitus ([facility] )        PAST SURGICAL HISTORY:  Past Surgical History:   Procedure Laterality Date    HERNIA REPAIR         FAMILY HISTORY:  History reviewed  No pertinent family history      SOCIAL HISTORY:  Social History     Tobacco Use    Smoking status: Former Smoker     Types: Cigarettes     Last attempt to quit: 2015     Years since quittin 0    Smokeless tobacco: Never Used   Substance Use Topics    Alcohol use: Never     Frequency: Never    Drug use: Never       MEDICATIONS:    Current Outpatient Medications:     benralizumab (FASENRA) subcutaneous injection, Inject 30 mg under the skin, Disp: , Rfl:     HYDROcodone-acetaminophen (NORCO) 5-325 mg per tablet, Take 1 tablet by mouth every 6 (six) hours as needed for painMax Daily Amount: 4 tablets, Disp: 20 tablet, Rfl: 0    ibuprofen (MOTRIN) 800 mg tablet, ibuprofen 800 mg tablet, Disp: , Rfl:     insulin aspart protamine-insulin aspart (NOVOLOG MIX 70/30) 100 units/mL injection, Novolog Mix 70-30 U-100 Insulin 100 unit/mL subcutaneous solution, Disp: , Rfl:     levocetirizine (XYZAL) 5 MG tablet, levocetirizine 5 mg tablet, Disp: , Rfl:     levofloxacin (LEVAQUIN) 500 mg tablet, levofloxacin 500 mg tablet, Disp: , Rfl:     liraglutide (VICTOZA) injection, Victoza 2-Jae 0 6 mg/0 1 mL (18 mg/3 mL) subcutaneous pen injector, Disp: , Rfl:     montelukast (SINGULAIR) 10 mg tablet, montelukast 10 mg tablet, Disp: , Rfl:     predniSONE 10 mg tablet, Take 1 tablet by mouth 2 (two) times a day, Disp: , Rfl:     PROVENTIL  (90 Base) MCG/ACT inhaler, , Disp: , Rfl: 0    SYMBICORT 160-4 5 MCG/ACT inhaler, , Disp: , Rfl: 0    ALLERGIES:  Allergies   Allergen Reactions    Cephalexin     Clindamycin Hives    Sulfamethoxazole-Trimethoprim Hives       REVIEW OF SYSTEMS:  Review of Systems  ROS:      General: no fever, no chills  HEENT:  No loss of hearing or eyesight problems  Eyes:  No red eyes  Respiratory:  No coughing, shortness of breath or wheezing  Cardiovascular:  No chest pain, no palpitations  GI:  Abdomen soft nontender, denies nausea  Endocrine:  No muscle weakness, no frequent urination, no excessive thirst  Urinary:  No dysuria, no incontinence  Musculoskeletal: see HPI and PE  SKIN:  No skin rash, no dry skin  Neurological:  No headaches, no confusion  Psychiatric:  No suicide thoughts, no anxiety, no depression  Review of all other systems is negative    VITALS:  Vitals:    01/07/20 1114   BP: 142/84   Pulse: 91       LABS:  HgA1c: No results found for: HGBA1C  BMP: No results found for: GLUCOSE, CALCIUM, NA, K, CO2, CL, BUN, CREATININE    _____________________________________________________  PHYSICAL EXAMINATION:  General: well developed and well nourished, alert, oriented times 3 and appears comfortable  Psychiatric: Normal  HEENT: Trachea Midline, No torticollis  Heart:  Regular rate and rhythm  Lungs:   Audible wheezes appreciated on expiration diffusely throughout  Pulmonary: No audible wheezing or respiratory distress   Skin: No masses, erythema, lacerations, fluctation, ulcerations  Neurovascular: Sensation Intact to the Median, Ulnar, Radial Nerve, Motor Intact to the Median, Ulnar, Radial Nerve and Pulses Intact    MUSCULOSKELETAL EXAMINATION:  De Quervain's Tenosynovitis Exam:  left side    Positive tender to palpation over 1st dorsal extensor compartment   Positive palpable nodule  Positive crepitus over 1st dorsal extensor compartment   Positive Finkelstein's test    Positive pain with resisted abduction of the thumb      Cyst is appreciated over the dorsal aspect of the wrist    ___________________________________________________  STUDIES REVIEWED:  No studies reviewed  PROCEDURES PERFORMED:  Procedures  No Procedures performed today    _____________________________________________________  ASSESSMENT/PLAN:    Left de Quervain tenosynovitis, left wrist dorsal ganglion cyst, left distal radius cyst  - patient would like to forego any type of cortisone injections at this time  She would like to proceed with surgical intervention for removal of her cyst as well as de Quervain tenosynovitis release  Detail consent was obtained today   Surgery: left de Quervain release, left dorsal wrist ganglion cyst excision, left distal radius cyst excision   Anesthesia:  IV sedation   Antibiotics:  None   Medical clearance: needed: Pulmonary   Hand therapy: ordered   Consent: obtained   Post op pain medication sent to pharmacy today    Surgery medication instructions: You will stop eating and drinking at midnight the night before your surgery, but you may continue to take your normal medications with a small sip of water      In the morning on the day of your surgery, we would like you to take the following medications (as long as you have never been told to avoid Tylenol or NSAIDs like ibuprofen, Naproxen, Aleve, Advil, etc):   Ibuprofen 600mg one tablet by mouth   Tylenol 500mg one tablet by mouth    After surgery, we would like you to take Ibuprofen 600mg one tablet by mouth every 6 hours with food (at breakfast, lunch and dinner)  AND Tylenol 500 mg one tablet by mouth every 6 hours  (at breakfast, lunch and dinner) for 5-7 days after your surgery  Please take these medication EVERYDAY after surgery for 5-7 days, and not just as needed  You can take these medications at the same time  Taking these medications after surgery will limit your need for prescription pain medication  We will also prescribe a narcotic pain medication for a limited time after surgery that you can take as needed for moderate or severe pain  De Quervain's tenosynovitis, left  -     Ambulatory referral to PT/OT hand therapy; Future  -     HYDROcodone-acetaminophen (NORCO) 5-325 mg per tablet; Take 1 tablet by mouth every 6 (six) hours as needed for painMax Daily Amount: 4 tablets  -     Case request operating room: RELEASE DEQUERVAINS left, Left dorsal wrist ganglion cyst excision, left distal radius cyst excision; Standing     Other orders  -     Diet NPO; Sips with meds; Standing  -     Height and weight upon arrival; Standing  -     Void on call to OR; Standing  -     Insert peripheral IV; Standing      Follow Up:  Return for post op   Work/school status:  No restrictions    To Do Next Visit:  Re-evaluation of current issue and Sutures out    Operative Discussions:  Armond Escobar Release: The anatomy and physiology of de Quervain's tenosynovitis was discussed with the patient today in the office  Edema and increased contact pressure within the first dorsal extensor compartment at the radial styloid can cause pain, crepitation, and limitation of function    Treatment options include resting thumb spica splints to decrease edema, oral anti-inflammatory medications, home or formal therapy exercises, up to 2 steroid injections within the first dorsal extensor compartment, or surgical release  While majority of patients do respond to conservative treatment, up to 20% may require surgical release  The patient has elected to undergo a release of the first dorsal extensor compartment (de Quervain's)  A small incision will be made over the radial styloid of the wrist, the tendon sheath holding the abductor pollicis longus and extensor pollicis brevis will be released  In the postoperative period, light activities are allowed immediately, driving is allowed when narcotic medication has stopped, and the incision may get wet after 2 days  Heavy activities (lifting more than approximately 10 pounds) will be allowed after the follow up appointment in 1-2 weeks  While the pain and discomfort within the wrist typically improves rapidly, some residual discomfort may be present for up to 6 weeks  The patient may notice temporary numbness within the superficial sensory branch of the radial nerve secondary to a traction neuropraxia  This is often a self-limiting condition  The patient has an understanding of the above mentioned discussion  Approximate success rate is 98%  The risks and benefits of the procedure were explained to the patient, which include, but are not limited to: Bleeding, infection, recurrence, pain, scar, damage to tendons, damage to nerves, and damage to blood vessels, failure to give desired results and complications related to anesthesia  These risks, along with alternative conservative treatment options, and postoperative protocols were voiced back and understood by the patient  All questions were answered to the patient's satisfaction  The patient agrees to comply with a standard postoperative protocol, and is willing to proceed  Education was provided via written and auditory forms  There were no barriers to learning  Written handouts regarding wound care, incision and scar care, and general preoperative information was provided to the patient    Prior to surgery, the patient may be requested to stop all anti-inflammatory medications  Prophylactic aspirin, Plavix, and Coumadin may be allowed to be continued  Medications including vitamin E , ginkgo, and fish oil are requested to be stopped approximately one week prior to surgery  Hypertensive medications and beta blockers, if taken, should be continued  and Ganglion Cyst Excision: The anatomy and physiology of the ganglion was discussed with the patient today in the office  Fluid leaking out of the joint surface typically creates a small sac, which can enlarge and cause pain or limitation of motion  Treatment options include observation, aspiration, or surgical incision were discussed with the patient today  Observation typically lead to resolution and approximately 10% of patients, aspiration least resolution approximately 50% of patients, and surgical excision lead to resolution in approximately 97% of patients  After discussion with the patient today, the patient voiced understanding of all treatment options  The patient has elected excision of the ganglion  The risks and benefits of the procedure were explained to the patient, which include, but are not limited to: Bleeding, infection, recurrence, pain, scar, damage to tendons, damage to nerves, and damage to blood vessels, failure to give desired results and complications related to anesthesia  These risks, along with alternative conservative treatment options, and postoperative protocols were voiced back and understood by the patient  All questions were answered to the patient's satisfaction  The patient agrees to comply with a standard postoperative protocol, and is willing to proceed  Education was provided via written and auditory forms  There were no barriers to learning  Written handouts regarding wound care, incision and scar care, and general preoperative information was provided to the patient    Prior to surgery, the patient may be requested to stop all anti-inflammatory medications  Prophylactic aspirin, Plavix, and Coumadin may be allowed to be continued  Medications including vitamin E , ginkgo, and fish oil are requested to be stopped approximately one week prior to surgery  Hypertensive medications and beta blockers, if taken, should be continued      Scribe Attestation    I,:   Denny Jay PA-C am acting as a scribe while in the presence of the attending physician :        I,:   Sudarshan Darling MD personally performed the services described in this documentation    as scribed in my presence :

## 2020-01-10 ENCOUNTER — HOSPITAL ENCOUNTER (OUTPATIENT)
Facility: HOSPITAL | Age: 65
Setting detail: OUTPATIENT SURGERY
Discharge: HOME | End: 2020-01-10
Attending: ORTHOPAEDIC SURGERY | Admitting: ORTHOPAEDIC SURGERY
Payer: MEDICARE

## 2020-01-10 ENCOUNTER — ANESTHESIA (OUTPATIENT)
Dept: PERIOP | Facility: HOSPITAL | Age: 65
End: 2020-01-10
Payer: MEDICARE

## 2020-01-10 VITALS
SYSTOLIC BLOOD PRESSURE: 139 MMHG | BODY MASS INDEX: 31.05 KG/M2 | HEIGHT: 58 IN | DIASTOLIC BLOOD PRESSURE: 65 MMHG | OXYGEN SATURATION: 95 % | HEART RATE: 63 BPM | TEMPERATURE: 97.7 F | RESPIRATION RATE: 18 BRPM | WEIGHT: 147.93 LBS

## 2020-01-10 LAB
GLUCOSE SERPL-MCNC: 159 MG/DL (ref 65–140)
GLUCOSE SERPL-MCNC: 199 MG/DL (ref 65–140)

## 2020-01-10 PROCEDURE — 25000 INCISION OF TENDON SHEATH: CPT | Performed by: ORTHOPAEDIC SURGERY

## 2020-01-10 PROCEDURE — NC001 PR NO CHARGE: Performed by: PHYSICIAN ASSISTANT

## 2020-01-10 PROCEDURE — 25111 REMOVE WRIST TENDON LESION: CPT | Performed by: PHYSICIAN ASSISTANT

## 2020-01-10 PROCEDURE — 25000 INCISION OF TENDON SHEATH: CPT | Performed by: PHYSICIAN ASSISTANT

## 2020-01-10 PROCEDURE — 25111 REMOVE WRIST TENDON LESION: CPT | Performed by: ORTHOPAEDIC SURGERY

## 2020-01-10 PROCEDURE — 82948 REAGENT STRIP/BLOOD GLUCOSE: CPT

## 2020-01-10 RX ORDER — FENTANYL CITRATE 50 UG/ML
INJECTION, SOLUTION INTRAMUSCULAR; INTRAVENOUS AS NEEDED
Status: DISCONTINUED | OUTPATIENT
Start: 2020-01-10 | End: 2020-01-10 | Stop reason: SURG

## 2020-01-10 RX ORDER — ONDANSETRON 2 MG/ML
4 INJECTION INTRAMUSCULAR; INTRAVENOUS ONCE AS NEEDED
Status: DISCONTINUED | OUTPATIENT
Start: 2020-01-10 | End: 2020-01-10 | Stop reason: HOSPADM

## 2020-01-10 RX ORDER — TRAMADOL HYDROCHLORIDE 50 MG/1
50 TABLET ORAL EVERY 6 HOURS PRN
Status: DISCONTINUED | OUTPATIENT
Start: 2020-01-10 | End: 2020-01-10 | Stop reason: HOSPADM

## 2020-01-10 RX ORDER — SODIUM CHLORIDE, SODIUM LACTATE, POTASSIUM CHLORIDE, CALCIUM CHLORIDE 600; 310; 30; 20 MG/100ML; MG/100ML; MG/100ML; MG/100ML
125 INJECTION, SOLUTION INTRAVENOUS CONTINUOUS
Status: DISCONTINUED | OUTPATIENT
Start: 2020-01-10 | End: 2020-01-10 | Stop reason: HOSPADM

## 2020-01-10 RX ORDER — ALBUTEROL SULFATE 2.5 MG/3ML
2.5 SOLUTION RESPIRATORY (INHALATION) ONCE AS NEEDED
Status: COMPLETED | OUTPATIENT
Start: 2020-01-10 | End: 2020-01-10

## 2020-01-10 RX ORDER — ONDANSETRON 2 MG/ML
4 INJECTION INTRAMUSCULAR; INTRAVENOUS EVERY 6 HOURS PRN
Status: DISCONTINUED | OUTPATIENT
Start: 2020-01-10 | End: 2020-01-10 | Stop reason: HOSPADM

## 2020-01-10 RX ORDER — CEFAZOLIN SODIUM 1 G/50ML
SOLUTION INTRAVENOUS AS NEEDED
Status: DISCONTINUED | OUTPATIENT
Start: 2020-01-10 | End: 2020-01-10 | Stop reason: SURG

## 2020-01-10 RX ORDER — PROPOFOL 10 MG/ML
INJECTION, EMULSION INTRAVENOUS CONTINUOUS PRN
Status: DISCONTINUED | OUTPATIENT
Start: 2020-01-10 | End: 2020-01-10 | Stop reason: SURG

## 2020-01-10 RX ORDER — METOCLOPRAMIDE HYDROCHLORIDE 5 MG/ML
10 INJECTION INTRAMUSCULAR; INTRAVENOUS ONCE AS NEEDED
Status: DISCONTINUED | OUTPATIENT
Start: 2020-01-10 | End: 2020-01-10 | Stop reason: HOSPADM

## 2020-01-10 RX ORDER — LIDOCAINE HYDROCHLORIDE 10 MG/ML
INJECTION, SOLUTION EPIDURAL; INFILTRATION; INTRACAUDAL; PERINEURAL AS NEEDED
Status: DISCONTINUED | OUTPATIENT
Start: 2020-01-10 | End: 2020-01-10 | Stop reason: SURG

## 2020-01-10 RX ORDER — ACETAMINOPHEN 325 MG/1
650 TABLET ORAL EVERY 6 HOURS PRN
Status: DISCONTINUED | OUTPATIENT
Start: 2020-01-10 | End: 2020-01-10 | Stop reason: HOSPADM

## 2020-01-10 RX ORDER — FENTANYL CITRATE/PF 50 MCG/ML
25 SYRINGE (ML) INJECTION
Status: DISCONTINUED | OUTPATIENT
Start: 2020-01-10 | End: 2020-01-10 | Stop reason: HOSPADM

## 2020-01-10 RX ORDER — PROPOFOL 10 MG/ML
INJECTION, EMULSION INTRAVENOUS AS NEEDED
Status: DISCONTINUED | OUTPATIENT
Start: 2020-01-10 | End: 2020-01-10 | Stop reason: SURG

## 2020-01-10 RX ADMIN — PROPOFOL 100 MCG/KG/MIN: 10 INJECTION, EMULSION INTRAVENOUS at 12:08

## 2020-01-10 RX ADMIN — ALBUTEROL SULFATE 2.5 MG: 2.5 SOLUTION RESPIRATORY (INHALATION) at 12:45

## 2020-01-10 RX ADMIN — FENTANYL CITRATE 50 MCG: 50 INJECTION, SOLUTION INTRAMUSCULAR; INTRAVENOUS at 12:19

## 2020-01-10 RX ADMIN — LIDOCAINE HYDROCHLORIDE 50 MG: 10 INJECTION, SOLUTION EPIDURAL; INFILTRATION; INTRACAUDAL; PERINEURAL at 12:08

## 2020-01-10 RX ADMIN — PROPOFOL 30 MG: 10 INJECTION, EMULSION INTRAVENOUS at 12:12

## 2020-01-10 RX ADMIN — CEFAZOLIN SODIUM 1000 MG: 1 SOLUTION INTRAVENOUS at 12:06

## 2020-01-10 RX ADMIN — FENTANYL CITRATE 50 MCG: 50 INJECTION, SOLUTION INTRAMUSCULAR; INTRAVENOUS at 12:03

## 2020-01-10 RX ADMIN — SODIUM CHLORIDE, SODIUM LACTATE, POTASSIUM CHLORIDE, AND CALCIUM CHLORIDE 125 ML/HR: .6; .31; .03; .02 INJECTION, SOLUTION INTRAVENOUS at 10:55

## 2020-01-10 RX ADMIN — PROPOFOL 70 MG: 10 INJECTION, EMULSION INTRAVENOUS at 12:08

## 2020-01-10 NOTE — ANESTHESIA PREPROCEDURE EVALUATION
Review of Systems/Medical History  Patient summary reviewed        Cardiovascular  Negative cardio ROS    Pulmonary  COPD , Asthma ,   Comment: Preop evaluation for hand surgery: Chest CT scan from December 2019 and pulmonary function test from December 2019 were both reviewed  The patient is cleared from pulmonary standpoint to undergo general anesthesia for surgery  Acute bronchitis with COPD exacerbation patient will be started on Augmentin 875 mg p o  every 12 hours for 10 days advised her to continue with the albuterol 4 times daily and PRN  Cough productive of yellow-green sputum patient will start on Phenergan with codeine for cough suppression  GI/Hepatic  Negative GI/hepatic ROS          Negative  ROS        Endo/Other  Diabetes ,      GYN  Negative gynecology ROS          Hematology  Negative hematology ROS      Musculoskeletal  Negative musculoskeletal ROS        Neurology  Negative neurology ROS      Psychology   Negative psychology ROS              Physical Exam    Airway    Mallampati score: II  TM Distance: >3 FB  Neck ROM: full     Dental       Cardiovascular  Comment: Negative ROS, Cardiovascular exam normal    Pulmonary  Pulmonary exam normal     Other Findings        Anesthesia Plan  ASA Score- 2     Anesthesia Type- IV sedation with anesthesia with ASA Monitors  Additional Monitors:   Airway Plan:         Plan Factors-    Induction- intravenous  Postoperative Plan-     Informed Consent- Anesthetic plan and risks discussed with patient  I personally reviewed this patient with the CRNA  Discussed and agreed on the Anesthesia Plan with the CRNA  Tammi Hunt

## 2020-01-10 NOTE — OP NOTE
OPERATIVE REPORT    PATIENT NAME: Carlotta Ng    MEDICAL RECORD NO:  [id number]    PROCEDURE DATE:  01/10/20    :  1955    SURGEON:  DEON Maldonado , Ph D     Opal Joseph:  Juan Carroll    No qualified resident was available to assist for this surgery   A Physician Assistant was used to assist in retraction  PREOPERATIVE DIAGNOSIS:   1) left dorsal wrist ganglion cyst  2) left de Quervains tenosynovitis     POSTOPERATIVE DIAGNOSIS:  1) left dorsal wrist ganglion cyst   2) left de Quervains tenosynovitis     PROCEDURE PERFORMED:  1) left dorsal wrist ganglion cyst excision   2) left de Quervains release    ANESTHESIA:  conscious sedation and local     COMPLICATIONS:   none    TOURNIQUET TIME:  10 minutes at 250 mmHg     DISPOSITION: Patient was sent to the PACU in stable condition  INDICATIONS: Patient is a 59 y o  female with a symptomatic left dorsal wrist ganglion cyst and De Quervain's tenosyovitis  Surgical and nonsurgical treatment options were offered  Patient opted for surgical excision of the cyst and De Quervain's release of the 1st dorsal compartment  Risks and benefits of the procedure were explained  PROCEDURE:  The patient was identified in the preoperative screening area  Consent was signed and verified after identifying the correct operative site  The patient was taken back to the operating room where sedation and Gray block anesthesia was performed under tourniquet control at 250 mmHg  The patients left upper extremity was then prepped and draped in normal sterile fashion with chlorhexidine solution  The Esmarch was removed  A standard transverse incision was made over the dorsal aspect of the wrist, centered over the dorsal wrist ganglion cyst   The incision was made sharply for a 2 cm length  The subcutaneous tissue was dissected bluntly  The extensor retinaculum was incised longitudinally along the radial border of the 4th dorsal compartment tendons   The cyst was identified beneath the fascial/retinacular layer and was freed from the surrounding soft tissues  The cyst identified was a multi-lobular cyst   The stock was identified and dissected down to its origin from the doral scapholunate ligament  The cyst and stock were then excised from its origin along with a portion of the capsule  A cautery was utilized to cauterize the surrounding area of the stalk to terminate any potential satellite lesions  Next attention was turned to the 1st dorsal compartment  A transverse incision was made over the left styloid approximately 1 5 cm  in length  The incision was made sharply with a #15 blade knife  Blunt dissection was taken through the subcutaneous tissue and care was taken to identify and protect the branches of the radial sensory nerve  The extensor tendons were identified distal to the extensor retinaculum  The retinaculum was then incised along the dorso-ulnar aspect of the first dorsal compartment  The EPB and APL tendons were identified and completely freed from the extensor retinaculum  The EPB and APL tendons were  by a retinacular septum which was excised  The retinaculum was found to be severely thickened  The synovium appeared moderately inflamed and was not excised  Once complete release was verified no further compression sites were identified    The wounds were irrigated with copious amounts of saline solution and irrigated with 0 5% Marcaine  The wounds were then closed with nylon suture in interrupted horizontal mattress fashion  The patient tolerated the procedure well  There were no complications during the case  As no first assistant was provided by the hospital, it was elected to use a physician's assistant to stabilize the extremity and aid in instrumentation              Julia Jackson MD 01/10/20 12:27 PM

## 2020-01-10 NOTE — DISCHARGE INSTRUCTIONS
Post Operative Instructions    You have had surgery on your arm today, please read and follow the information below:  · Elevate your hand above your elbow during the next 24-48 hours to help with swelling  · Place your hand and arm over your head with motion at your shoulder three times a day  · Do not apply any cream/ointment/oil to your incisions including antibiotics  · Do not soak your hands in standing water (dishwater, tubs, Jacuzzi's, pools, etc ) until given permission (typically 2-3 weeks after injury)    Call the office at [phone]  if you notice any:  · Increased numbness or tingling of your hand or fingers that is not relieved with elevation  · Increasing pain that is not controlled with medication  · Difficulty chewing, breathing, swallowing  · Chest pains or shortness of breath  · Fever over 101 4 degrees  Bandage: Your therapist will remove your bandage at your first therapy appointment  Motion: Move fingers into a fist 5 times a day, DO NOT move any splinted fingers  Weight bearing status: Avoid heavy lifting (>5 pounds) with the extremity that was operated on until follow up appointment  Normal activities of daily living are OK  Ice: Ice for 10 minutes every hour as needed for swelling x 24 hours  Sling: No sling necessary  Pain medication: A prescription for pain medication was provided in the office and sent to your pharmacy  Your prescription pain medication also contains Tylenol  Please limit total Tylenol dose to under 3,000 mg a day  After surgery, we would like you to take Ibuprofen 600mg one tablet by mouth every 6 hours with food (at breakfast, lunch and dinner)  AND Tylenol 500 mg one tablet by mouth every 6 hours  (at breakfast, lunch and dinner) for 5-7 days after your surgery  Please take these medication EVERYDAY after surgery for 5-7 days, and not just as needed  You can take these medications at the same time    Taking these medications after surgery will limit your need for prescription pain medication  If the pain becomes severe, and the pain medication is not alleviating symptoms, The greatest source of pain after surgery is usually a tight dressing due to increased swelling after surgery  If the pain becomes severe after surgery, and the patient medication is NOT alleviating the symptoms, the patient should do the following: The patient should  loosen the top dressing (usually coban or an ace bandage) and loosen/cut the rolled gauze beneath  The 4x4 gauze that is directly covering the incision should remain in place  The splint (if the patient has one) should remain in place  The ace bandage/coban can then be replaced on top in a less constrictive manor  If this does not help relieve the pain/numbness in a few hours, the patient should call our office (number listed below)  and we can have them seen in the office for further evaluation  Follow-up Appointment: 7-10 days with Dr Johnna Rivera  Occupational Therapy: 1/13/2020  [address] [place] [address], the patient may remove the splint/dressing for showering and clean the incision with soap and water  Keep incision dry after washing  Do not expose the incision to dirty water (oceans, pools, hot tubs, etc)     If you need help scheduling Therapy, you can call [phone]        Please call the office at [phone] if you have any questions or concerns regarding your post-operative care  [facility]

## 2020-01-10 NOTE — ANESTHESIA POSTPROCEDURE EVALUATION
Post-Op Assessment Note    CV Status:  Stable  Pain Score: 0    Pain management: adequate     Mental Status:  Alert and awake   Hydration Status:  Stable   PONV Controlled:  None   Airway Patency:  Patent and adequate   Post Op Vitals Reviewed: Yes      Staff: Anesthesiologist, CRNA           BP   87/56   Temp  97 5   Pulse  77   Resp   18   SpO2 98%

## 2020-01-10 NOTE — INTERVAL H&P NOTE
H&P reviewed  After examining the patient I find no changes in the patients condition since the H&P had been written      Vitals:    01/10/20 1045   BP: 151/84   Pulse: 83   Resp: 20   Temp: (!) 97 2 °F (36 2 °C)   SpO2: 95%

## 2020-01-13 ENCOUNTER — EVALUATION (OUTPATIENT)
Dept: OCCUPATIONAL THERAPY | Facility: CLINIC | Age: 65
End: 2020-01-13
Payer: MEDICARE

## 2020-01-13 DIAGNOSIS — M25.532 LEFT WRIST PAIN: Primary | ICD-10-CM

## 2020-01-13 DIAGNOSIS — M65.4 DE QUERVAIN'S TENOSYNOVITIS, LEFT: ICD-10-CM

## 2020-01-13 PROCEDURE — 97165 OT EVAL LOW COMPLEX 30 MIN: CPT | Performed by: OCCUPATIONAL THERAPIST

## 2020-01-13 PROCEDURE — 97110 THERAPEUTIC EXERCISES: CPT | Performed by: OCCUPATIONAL THERAPIST

## 2020-01-13 NOTE — PROGRESS NOTES
OT Evaluation     Today's date: 2020  Patient name: Oscar Rushing  : 1955  MRN: [id number]  Referring provider: Haven Davidson  Dx:   Encounter Diagnosis     ICD-10-CM    1  Left wrist pain M25 532    2  De Quervain's tenosynovitis, left M65 4 Ambulatory referral to PT/OT hand therapy       Start Time: 1030  Stop Time: 1055  Total time in clinic (min): 25 minutes    Assessment  Assessment details: 58 yo RHD female POD3 L de Quervain's release and ganglion cyst removed on dorsal aspect of wrist   Physically, presents with increased swelling, pain and decreased ROM  Cautioned against wrist flexion at this time  Bulky dressing removed with dried blood at surgical sites  No s/sx of infection  Educated re:  S/sx of infection and wound care  HEP for TGE's and finger to thumb opposition  Educated on benefits of heat and ice  Patient declined OT at this time  Skilled OT not indicated  Patient will contact OT if she needs further OT  Impairments: abnormal or restricted ROM, impaired physical strength, lacks appropriate home exercise program and pain with function    Symptom irritability: lowUnderstanding of Dx/Px/POC: good   Prognosis: good    Plan  Patient would benefit from: skilled occupational therapy  Planned therapy interventions: therapeutic exercise and home exercise program  Duration in weeks: 1  Plan of Care beginning date: 2020  Plan of Care expiration date: 2020  Treatment plan discussed with: patient        Subjective Evaluation    History of Present Illness  Date of onset: 2016  Date of surgery: 1/10/2020  Mechanism of injury: surgery  Mechanism of injury: I fell 4-5 years ago and that's when it started  The pain started about a year ago  I don't feel any change since the surgery  I have been using my hand to take care of my disabled daughter but I haven't been lifting              Recurrent probem    Quality of life: good    Pain  Current pain rating: 6  At best pain ratin  At worst pain ratin  Location: surgical site  Quality: throbbing, squeezing and needle-like  Relieving factors: ice  Progression: no change    Social Support  Lives in: multiple-level home  Lives with: adult children and spouse    Employment status: not working  Hand dominance: right  Life stress: low          Objective     Active Range of Motion     Left Wrist   Wrist extension: 64 degrees     Left Thumb   Opposition: Finger to thumb opposition intact with thumb slide to volar head of MP joint  Additional Active Range of Motion Details  Loose composite fist                Precautions:  Universal precautions         Manual                                                                  Wound  educated                Exercise Diary              TGE's HEP hand out            Finger to thumb opposiiton HEP / hand out                                                                                                                                                                                                                                                          Modalities              Heat educated

## 2020-01-21 ENCOUNTER — OFFICE VISIT (OUTPATIENT)
Dept: OBGYN CLINIC | Facility: CLINIC | Age: 65
End: 2020-01-21

## 2020-01-21 VITALS
HEIGHT: 58 IN | SYSTOLIC BLOOD PRESSURE: 136 MMHG | BODY MASS INDEX: 31.05 KG/M2 | DIASTOLIC BLOOD PRESSURE: 83 MMHG | WEIGHT: 147.9 LBS | HEART RATE: 91 BPM

## 2020-01-21 DIAGNOSIS — Z48.89 AFTERCARE FOLLOWING SURGERY: Primary | ICD-10-CM

## 2020-01-21 PROCEDURE — 99024 POSTOP FOLLOW-UP VISIT: CPT | Performed by: ORTHOPAEDIC SURGERY

## 2020-01-21 RX ORDER — GABAPENTIN 300 MG/1
CAPSULE ORAL
COMMUNITY
Start: 2020-01-19

## 2020-01-21 NOTE — PROGRESS NOTES
SUBJECTIVE:  Lucien Carrillo is a 59y o  year old female who presents for follow up after surgery, left dorsal ganglion cyst excision and left de Quervain release performed on  1/10/2020  Today patient has intermittent discomfort in her left wrist  Patient states that she obtained home exercise program from therapy  VITALS:  Vitals:    01/21/20 1054   BP: 136/83   Pulse: 91       PHYSICAL EXAMINATION:  General: well developed and well nourished, alert, oriented times 3 and appears comfortable  Psychiatric: Normal    MUSCULOSKELETAL EXAMINATION:  Left wrist   Incision: Clean, dry, with sutures intact- suture irritation noted   Range of Motion: stiff motion of wrist   Fullness over the 1st extensor compartment   Neurovascular status: Neuro intact, good cap refill      STUDIES REVIEWED:  No studies reviewed  PROCEDURES PERFORMED:  Procedures  No Procedures performed today      ASSESSMENT/PLAN:    S/P excision of left dorsal ganglion and left de Quervain release  * sutures removed today without complications  * Pt was advised to work on motion of the wrist with HEP  * Pt was provided with a universal wrist splint to wear as needed  * Pt will follow up in the office in 56 weeks for reevaluation    FOLLOW UP:  Return in about 6 weeks (around 3/3/2020)        TO DO AT NEXT VISIT:  Re-evaluation of current issue      Scribe Attestation    I,:   Iza Razo am acting as a scribe while in the presence of the attending physician :        I,:   Halle Koehler MD personally performed the services described in this documentation    as scribed in my presence :

## 2020-03-03 ENCOUNTER — OFFICE VISIT (OUTPATIENT)
Dept: OBGYN CLINIC | Facility: CLINIC | Age: 65
End: 2020-03-03

## 2020-03-03 VITALS
SYSTOLIC BLOOD PRESSURE: 148 MMHG | BODY MASS INDEX: 30.94 KG/M2 | DIASTOLIC BLOOD PRESSURE: 80 MMHG | HEART RATE: 91 BPM | HEIGHT: 58 IN | WEIGHT: 147.4 LBS

## 2020-03-03 DIAGNOSIS — Z48.89 AFTERCARE FOLLOWING SURGERY: Primary | ICD-10-CM

## 2020-03-03 PROCEDURE — 99024 POSTOP FOLLOW-UP VISIT: CPT | Performed by: ORTHOPAEDIC SURGERY

## 2020-03-03 RX ORDER — IPRATROPIUM BROMIDE 17 UG/1
2 AEROSOL, METERED RESPIRATORY (INHALATION) 4 TIMES DAILY
COMMUNITY
Start: 2020-02-28

## 2020-03-03 RX ORDER — TEMAZEPAM 30 MG/1
CAPSULE ORAL
COMMUNITY
Start: 2020-02-27

## 2020-03-03 RX ORDER — ROFLUMILAST 500 UG/1
TABLET ORAL
COMMUNITY
Start: 2020-02-28

## 2020-03-03 NOTE — PROGRESS NOTES
CHIEF COMPLAINT:  Chief Complaint   Patient presents with    Left Wrist - Follow-up       SUBJECTIVE:  Nanette Mccracken is a 59y o  year old RHD female who presents for follow-up regarding s/p excision of left dorsal ganglion and left de Quervain release  Patient states that she notes some numbness and tingling over the dorsal aspect of her thumb however she has been doing much better with her range of motion at the wrist as well as the thumb  Her incisions have healed well  She denies any other numbness and tingling through the digits  PAST MEDICAL HISTORY:  Past Medical History:   Diagnosis Date    Asthma     [facility]     [facility]        PAST SURGICAL HISTORY:  Past Surgical History:   Procedure Laterality Date    HERNIA REPAIR      OH EXCIS PRIMARY GANGLION WRIST Left 1/10/2020    Procedure: DORSAL WRIST GANGLION CYST EXCISION; DISTAL RADIUS CYST EXCISION;  Surgeon: Burgess Brigette MD;  Location: [facility] OR;  Service: Orthopedics    [place] [address] STYLO[address] Left 1/10/2020    Procedure: Lesly Cords;  Surgeon: Burgess Brigette MD;  Location: [facility] OR;  Service: Orthopedics       FAMILY HISTORY:  History reviewed  No pertinent family history      SOCIAL HISTORY:  Social History     Tobacco Use    Smoking status: Former Smoker     Types: Cigarettes     Last attempt to quit: 2015     Years since quittin 1    Smokeless tobacco: Never Used   Substance Use Topics    Alcohol use: Never     Frequency: Never    Drug use: Never       MEDICATIONS:    Current Outpatient Medications:     ATROVENT HFA 17 MCG/ACT inhaler, 2 puffs 4 (four) times a day, Disp: , Rfl:     benralizumab (FASENRA) subcutaneous injection, Inject 30 mg under the skin, Disp: , Rfl:     DALIRESP 500 MCG tablet, , Disp: , Rfl:     gabapentin (NEURONTIN) 300 mg capsule, , Disp: , Rfl:     ibuprofen (MOTRIN) 800 mg tablet, ibuprofen 800 mg tablet, Disp: , Rfl:   insulin aspart protamine-insulin aspart (NOVOLOG MIX 70/30) 100 units/mL injection, Novolog Mix 70-30 U-100 Insulin 100 unit/mL subcutaneous solution, Disp: , Rfl:     levocetirizine (XYZAL) 5 MG tablet, levocetirizine 5 mg tablet, Disp: , Rfl:     liraglutide (VICTOZA) injection, Victoza 2-Jae 0 6 mg/0 1 mL (18 mg/3 mL) subcutaneous pen injector, Disp: , Rfl:     metFORMIN (GLUCOPHAGE) 850 mg tablet, Take 850 mg by mouth, Disp: , Rfl:     montelukast (SINGULAIR) 10 mg tablet, montelukast 10 mg tablet, Disp: , Rfl:     predniSONE 10 mg tablet, Take 1 tablet by mouth 2 (two) times a day, Disp: , Rfl:     PROVENTIL  (90 Base) MCG/ACT inhaler, , Disp: , Rfl: 0    SYMBICORT 160-4 5 MCG/ACT inhaler, , Disp: , Rfl: 0    temazepam (RESTORIL) 30 mg capsule, take 1 capsule by mouth nightly if needed for sleep, Disp: , Rfl:     ALLERGIES:  Allergies   Allergen Reactions    Cephalexin Hives    Clindamycin Hives    Sulfamethoxazole-Trimethoprim Hives       REVIEW OF SYSTEMS:  Review of Systems  ROS:   General: no fever, no chills  HEENT:  No loss of hearing or eyesight problems  Eyes:  No red eyes  Respiratory:  No coughing, shortness of breath or wheezing  Cardiovascular:  No chest pain, no palpitations  GI:  Abdomen soft nontender, denies nausea  Endocrine:  No muscle weakness, no frequent urination, no excessive thirst  Urinary:  No dysuria, no incontinence  Musculoskeletal: see HPI and PE  SKIN:  No skin rash, no dry skin  Neurological:  No headaches, no confusion  Psychiatric:  No suicide thoughts, no anxiety, no depression  Review of all other systems is negative    VITALS:  Vitals:    03/03/20 1054   BP: 148/80   Pulse: 91       LABS:  HgA1c: No results found for: HGBA1C  BMP: No results found for: GLUCOSE, CALCIUM, NA, K, CO2, CL, BUN, CREATININE    _____________________________________________________  PHYSICAL EXAMINATION:  General: well developed and well nourished, alert, oriented times 3 and appears comfortable  Psychiatric: Normal  HEENT: Trachea Midline, No torticollis  Pulmonary: No audible wheezing or respiratory distress   Skin: No masses, erythema, lacerations, fluctation, ulcerations  Neurovascular: Sensation Intact to the Median, Ulnar, Radial Nerve, Motor Intact to the Median, Ulnar, Radial Nerve and Pulses Intact    MUSCULOSKELETAL EXAMINATION:  Left wrist  No erythema edema or ecchymosis noted, skin is warm to touch  Incisions are well healed over the dorsal wrist and 1st extensor compartment  She has good range of motion of the wrist as well as the thumb  She has decreased sensation to light touch over the dorsal aspect of her thumb however normal sensation over the palmar aspect  Patient is neurovascular intact    ___________________________________________________  STUDIES REVIEWED:  No studies reviewed  PROCEDURES PERFORMED:  Procedures  No Procedures performed today    _____________________________________________________  ASSESSMENT/PLAN:    S/p excision of left dorsal ganglion and left de Quervain release  - was discussed with the patient that the numbness and tingling will resolve over time  - patient can use her hand as tolerated for activities of daily living  - she will follow-up with us as needed      Follow Up:  Return if symptoms worsen or fail to improve      Work/school status:  No restrictions    To Do Next Visit:  Re-evaluation of current issue      Scribe Attestation    I,:   Krissy Mcdaniel PA-C am acting as a scribe while in the presence of the attending physician :        I,:   Palomo Melendrez MD personally performed the services described in this documentation    as scribed in my presence :

## 2021-07-26 ENCOUNTER — TELEPHONE (OUTPATIENT)
Dept: GASTROENTEROLOGY | Facility: CLINIC | Age: 66
End: 2021-07-26

## 2021-07-26 NOTE — TELEPHONE ENCOUNTER
Pt had flip done with Dr Garcia Finely 9/2020 at [facility] and is due for recalls 9/11/21, however, pt does not wish to travel this far  I gave her the phone number for [phone]  Flip is needed for hx of multiple colon polyps/poor prep/hx of mild reactive gastropathy/hx of intestinal metaplasia  Faxed old records to [address] to be scanned into pt's chart

## 2021-08-17 NOTE — PROGRESS NOTES
CHIEF COMPLAINT:  Chief Complaint   Patient presents with    Left Wrist - Follow-up       SUBJECTIVE:  Vivek Martins is a 59y o  year old RHD female hand pain  Patient states that the cortisone injection that was administered for left de Quervain tenosynovitis tonight help back in August   She is also complaining of a cyst over the dorsal aspect of her wrist and over the distal radius  She states that there painful  She would like to forego any cortisone injection at this time and she would like to proceed with surgical intervention for release of her de Quervain tenosynovitis as well as removal of cyst   She denies any numbness or tingling  The patient denies any cardiac  Patient has a history of COPD  Denies diabetes  Denies any history of MI, gastric ulcers, kidney or liver issues  Denies blood thinners  PAST MEDICAL HISTORY:  Past Medical History:   Diagnosis Date    Asthma     [facility]     [facility]        PAST SURGICAL HISTORY:  Past Surgical History:   Procedure Laterality Date    HERNIA REPAIR         FAMILY HISTORY:  History reviewed  No pertinent family history      SOCIAL HISTORY:  Social History     Tobacco Use    Smoking status: Former Smoker     Types: Cigarettes     Last attempt to quit: 2015     Years since quittin 0    Smokeless tobacco: Never Used   Substance Use Topics    Alcohol use: Never     Frequency: Never    Drug use: Never       MEDICATIONS:    Current Outpatient Medications:     benralizumab (FASENRA) subcutaneous injection, Inject 30 mg under the skin, Disp: , Rfl:     HYDROcodone-acetaminophen (NORCO) 5-325 mg per tablet, Take 1 tablet by mouth every 6 (six) hours as needed for painMax Daily Amount: 4 tablets, Disp: 20 tablet, Rfl: 0    ibuprofen (MOTRIN) 800 mg tablet, ibuprofen 800 mg tablet, Disp: , Rfl:     insulin aspart protamine-insulin aspart (NOVOLOG MIX 70/30) 100 units/mL injection, Per below PA note patient needs to complete X-ray and have  Six week trial of failed treatment with follow up contact. Pt has order for X-ray not completed     Pt was not ordered PT by our clinic for cervical spine. Pt has not been ordered any medication from our providers at this time. Routed to provider to inquire how to proceed. Novolog Mix 70-30 U-100 Insulin 100 unit/mL subcutaneous solution, Disp: , Rfl:     levocetirizine (XYZAL) 5 MG tablet, levocetirizine 5 mg tablet, Disp: , Rfl:     levofloxacin (LEVAQUIN) 500 mg tablet, levofloxacin 500 mg tablet, Disp: , Rfl:     liraglutide (VICTOZA) injection, Victoza 2-Jae 0 6 mg/0 1 mL (18 mg/3 mL) subcutaneous pen injector, Disp: , Rfl:     montelukast (SINGULAIR) 10 mg tablet, montelukast 10 mg tablet, Disp: , Rfl:     predniSONE 10 mg tablet, Take 1 tablet by mouth 2 (two) times a day, Disp: , Rfl:     PROVENTIL  (90 Base) MCG/ACT inhaler, , Disp: , Rfl: 0    SYMBICORT 160-4 5 MCG/ACT inhaler, , Disp: , Rfl: 0    ALLERGIES:  Allergies   Allergen Reactions    Cephalexin     Clindamycin Hives    Sulfamethoxazole-Trimethoprim Hives       REVIEW OF SYSTEMS:  Review of Systems  ROS:      General: no fever, no chills  HEENT:  No loss of hearing or eyesight problems  Eyes:  No red eyes  Respiratory:  No coughing, shortness of breath or wheezing  Cardiovascular:  No chest pain, no palpitations  GI:  Abdomen soft nontender, denies nausea  Endocrine:  No muscle weakness, no frequent urination, no excessive thirst  Urinary:  No dysuria, no incontinence  Musculoskeletal: see HPI and PE  SKIN:  No skin rash, no dry skin  Neurological:  No headaches, no confusion  Psychiatric:  No suicide thoughts, no anxiety, no depression  Review of all other systems is negative    VITALS:  Vitals:    01/07/20 1114   BP: 142/84   Pulse: 91       LABS:  HgA1c: No results found for: HGBA1C  BMP: No results found for: GLUCOSE, CALCIUM, NA, K, CO2, CL, BUN, CREATININE    _____________________________________________________  PHYSICAL EXAMINATION:  General: well developed and well nourished, alert, oriented times 3 and appears comfortable  Psychiatric: Normal  HEENT: Trachea Midline, No torticollis  Heart:  Regular rate and rhythm  Lungs:   Audible wheezes appreciated on expiration diffusely throughout  Pulmonary: No audible wheezing or respiratory distress   Skin: No masses, erythema, lacerations, fluctation, ulcerations  Neurovascular: Sensation Intact to the Median, Ulnar, Radial Nerve, Motor Intact to the Median, Ulnar, Radial Nerve and Pulses Intact    MUSCULOSKELETAL EXAMINATION:  De Quervain's Tenosynovitis Exam:  left side    Positive tender to palpation over 1st dorsal extensor compartment   Positive palpable nodule  Positive crepitus over 1st dorsal extensor compartment   Positive Finkelstein's test    Positive pain with resisted abduction of the thumb      Cyst is appreciated over the dorsal aspect of the wrist    ___________________________________________________  STUDIES REVIEWED:  No studies reviewed  PROCEDURES PERFORMED:  Procedures  No Procedures performed today    _____________________________________________________  ASSESSMENT/PLAN:    Left de Quervain tenosynovitis, left wrist dorsal ganglion cyst, left distal radius cyst  - patient would like to forego any type of cortisone injections at this time  She would like to proceed with surgical intervention for removal of her cyst as well as de Quervain tenosynovitis release  Detail consent was obtained today   Surgery: left de Quervain release, left dorsal wrist ganglion cyst excision, left distal radius cyst excision   Anesthesia:  IV sedation   Antibiotics:  None   Medical clearance: needed: Pulmonary   Hand therapy: ordered   Consent: obtained   Post op pain medication sent to pharmacy today    Surgery medication instructions: You will stop eating and drinking at midnight the night before your surgery, but you may continue to take your normal medications with a small sip of water      In the morning on the day of your surgery, we would like you to take the following medications (as long as you have never been told to avoid Tylenol or NSAIDs like ibuprofen, Naproxen, Aleve, Advil, etc):   Ibuprofen 600mg one tablet by mouth   Tylenol 500mg one tablet by mouth    After surgery, we would like you to take Ibuprofen 600mg one tablet by mouth every 6 hours with food (at breakfast, lunch and dinner)  AND Tylenol 500 mg one tablet by mouth every 6 hours  (at breakfast, lunch and dinner) for 5-7 days after your surgery  Please take these medication EVERYDAY after surgery for 5-7 days, and not just as needed  You can take these medications at the same time  Taking these medications after surgery will limit your need for prescription pain medication  We will also prescribe a narcotic pain medication for a limited time after surgery that you can take as needed for moderate or severe pain  De Quervain's tenosynovitis, left  -     Ambulatory referral to PT/OT hand therapy; Future  -     HYDROcodone-acetaminophen (NORCO) 5-325 mg per tablet; Take 1 tablet by mouth every 6 (six) hours as needed for painMax Daily Amount: 4 tablets  -     Case request operating room: RELEASE DEQUERVAINS left, Left dorsal wrist ganglion cyst excision, left distal radius cyst excision; Standing     Other orders  -     Diet NPO; Sips with meds; Standing  -     Height and weight upon arrival; Standing  -     Void on call to OR; Standing  -     Insert peripheral IV; Standing      Follow Up:  Return for post op   Work/school status:  No restrictions    To Do Next Visit:  Re-evaluation of current issue and Sutures out    Operative Discussions:  Tj Barth Release: The anatomy and physiology of de Quervain's tenosynovitis was discussed with the patient today in the office  Edema and increased contact pressure within the first dorsal extensor compartment at the radial styloid can cause pain, crepitation, and limitation of function    Treatment options include resting thumb spica splints to decrease edema, oral anti-inflammatory medications, home or formal therapy exercises, up to 2 steroid injections within the first dorsal extensor compartment, or surgical release  While majority of patients do respond to conservative treatment, up to 20% may require surgical release  The patient has elected to undergo a release of the first dorsal extensor compartment (de Quervain's)  A small incision will be made over the radial styloid of the wrist, the tendon sheath holding the abductor pollicis longus and extensor pollicis brevis will be released  In the postoperative period, light activities are allowed immediately, driving is allowed when narcotic medication has stopped, and the incision may get wet after 2 days  Heavy activities (lifting more than approximately 10 pounds) will be allowed after the follow up appointment in 1-2 weeks  While the pain and discomfort within the wrist typically improves rapidly, some residual discomfort may be present for up to 6 weeks  The patient may notice temporary numbness within the superficial sensory branch of the radial nerve secondary to a traction neuropraxia  This is often a self-limiting condition  The patient has an understanding of the above mentioned discussion  Approximate success rate is 98%  The risks and benefits of the procedure were explained to the patient, which include, but are not limited to: Bleeding, infection, recurrence, pain, scar, damage to tendons, damage to nerves, and damage to blood vessels, failure to give desired results and complications related to anesthesia  These risks, along with alternative conservative treatment options, and postoperative protocols were voiced back and understood by the patient  All questions were answered to the patient's satisfaction  The patient agrees to comply with a standard postoperative protocol, and is willing to proceed  Education was provided via written and auditory forms  There were no barriers to learning  Written handouts regarding wound care, incision and scar care, and general preoperative information was provided to the patient    Prior to surgery, the patient may be requested to stop all anti-inflammatory medications  Prophylactic aspirin, Plavix, and Coumadin may be allowed to be continued  Medications including vitamin E , ginkgo, and fish oil are requested to be stopped approximately one week prior to surgery  Hypertensive medications and beta blockers, if taken, should be continued  and Ganglion Cyst Excision: The anatomy and physiology of the ganglion was discussed with the patient today in the office  Fluid leaking out of the joint surface typically creates a small sac, which can enlarge and cause pain or limitation of motion  Treatment options include observation, aspiration, or surgical incision were discussed with the patient today  Observation typically lead to resolution and approximately 10% of patients, aspiration least resolution approximately 50% of patients, and surgical excision lead to resolution in approximately 97% of patients  After discussion with the patient today, the patient voiced understanding of all treatment options  The patient has elected excision of the ganglion  The risks and benefits of the procedure were explained to the patient, which include, but are not limited to: Bleeding, infection, recurrence, pain, scar, damage to tendons, damage to nerves, and damage to blood vessels, failure to give desired results and complications related to anesthesia  These risks, along with alternative conservative treatment options, and postoperative protocols were voiced back and understood by the patient  All questions were answered to the patient's satisfaction  The patient agrees to comply with a standard postoperative protocol, and is willing to proceed  Education was provided via written and auditory forms  There were no barriers to learning  Written handouts regarding wound care, incision and scar care, and general preoperative information was provided to the patient    Prior to surgery, the patient may be requested to stop all anti-inflammatory medications  Prophylactic aspirin, Plavix, and Coumadin may be allowed to be continued  Medications including vitamin E , ginkgo, and fish oil are requested to be stopped approximately one week prior to surgery  Hypertensive medications and beta blockers, if taken, should be continued      Scribe Attestation    I,:   Annmarie Connolly PA-C am acting as a scribe while in the presence of the attending physician :        I,:   Delicia Vargas MD personally performed the services described in this documentation    as scribed in my presence :

## 2022-06-24 ENCOUNTER — HOSPITAL ENCOUNTER (EMERGENCY)
Facility: HOSPITAL | Age: 67
Discharge: HOME | End: 2022-06-24
Attending: EMERGENCY MEDICINE | Admitting: EMERGENCY MEDICINE
Payer: MEDICARE

## 2022-06-24 ENCOUNTER — APPOINTMENT (EMERGENCY)
Dept: RADIOLOGY | Facility: HOSPITAL | Age: 67
End: 2022-06-24
Payer: MEDICARE

## 2022-06-24 VITALS
TEMPERATURE: 97.6 F | DIASTOLIC BLOOD PRESSURE: 78 MMHG | SYSTOLIC BLOOD PRESSURE: 171 MMHG | RESPIRATION RATE: 18 BRPM | BODY MASS INDEX: 28.22 KG/M2 | HEART RATE: 91 BPM | WEIGHT: 135 LBS | OXYGEN SATURATION: 99 %

## 2022-06-24 DIAGNOSIS — M79.671 RIGHT FOOT PAIN: Primary | ICD-10-CM

## 2022-06-24 PROCEDURE — 99284 EMERGENCY DEPT VISIT MOD MDM: CPT | Performed by: PHYSICIAN ASSISTANT

## 2022-06-24 PROCEDURE — 99283 EMERGENCY DEPT VISIT LOW MDM: CPT

## 2022-06-24 PROCEDURE — 73630 X-RAY EXAM OF FOOT: CPT

## 2022-06-24 NOTE — DISCHARGE INSTRUCTIONS
Elevate your leg, rest, and apply ice  Take Tylenol as needed for pain      Please follow-up with orthopedics if symptoms persist

## 2022-06-24 NOTE — ED PROVIDER NOTES
History  Chief Complaint   Patient presents with    Leg Pain     Pt c/o right leg pain for the past 2 days  68yo female with a history of asthma on chronic prednisone and type 2 diabetes presenting for evaluation of right foot pain x 1 day  Patient has a history of chronic back pain and lumbar radiculopathy of the right leg  She typically has pain in her right leg but reports having pain in the dorsum of her right foot starting yesterday  She denies any trauma or increasing activity  Pain is worse with weight bearing and improves with rest  No paresthesias  She denies other acute complaints  History provided by:  Patient   used: No    Leg Pain  Location:  Foot  Injury: no    Foot location:  Dorsum of R foot  Pain details:     Quality:  Aching    Radiates to:  Does not radiate    Severity:  Moderate    Onset quality:  Gradual    Duration:  1 day    Timing:  Constant    Progression:  Unchanged  Chronicity:  New  Dislocation: no    Relieved by:  Rest  Worsened by:  Bearing weight  Associated symptoms: no decreased ROM, no fatigue, no fever, no itching, no muscle weakness, no neck pain, no numbness, no stiffness, no swelling and no tingling        Prior to Admission Medications   Prescriptions Last Dose Informant Patient Reported? Taking?    ATROVENT HFA 17 MCG/ACT inhaler  Self Yes No   Si puffs 4 (four) times a day   DALIRESP 500 MCG tablet  Self Yes No   PROVENTIL  (90 Base) MCG/ACT inhaler  Self Yes No   SYMBICORT 160-4 5 MCG/ACT inhaler  Self Yes No   benralizumab (FASENRA) subcutaneous injection  Self Yes No   Sig: Inject 30 mg under the skin   gabapentin (NEURONTIN) 300 mg capsule  Self Yes No   ibuprofen (MOTRIN) 800 mg tablet  Self Yes No   Sig: ibuprofen 800 mg tablet   insulin aspart protamine-insulin aspart (NOVOLOG MIX 70/30) 100 units/mL injection  Self Yes No   Sig: Novolog Mix 70-30 U-100 Insulin 100 unit/mL subcutaneous solution   levocetirizine (XYZAL) 5 MG tablet  Self Yes No   Sig: levocetirizine 5 mg tablet   liraglutide (VICTOZA) injection  Self Yes No   Sig: Victoza 2-Jae 0 6 mg/0 1 mL (18 mg/3 mL) subcutaneous pen injector   metFORMIN (GLUCOPHAGE) 850 mg tablet  Self Yes No   Sig: Take 850 mg by mouth   montelukast (SINGULAIR) 10 mg tablet  Self Yes No   Sig: montelukast 10 mg tablet   predniSONE 10 mg tablet  Self Yes No   Sig: Take 1 tablet by mouth 2 (two) times a day   temazepam (RESTORIL) 30 mg capsule  Self Yes No   Sig: take 1 capsule by mouth nightly if needed for sleep      Facility-Administered Medications: None       Past Medical History:   Diagnosis Date    Asthma     [facility]     [facility]        Past Surgical History:   Procedure Laterality Date    HERNIA REPAIR      MA EXCIS PRIMARY GANGLION WRIST Left 1/10/2020    Procedure: DORSAL WRIST GANGLION CYST EXCISION; DISTAL RADIUS CYST EXCISION;  Surgeon: Bg Watkins MD;  Location: [facility];  Service: Orthopedics    MA INCIS TENDON SHEATH,RADIAL STYLOID Left 1/10/2020    Procedure: Lorenza Smith;  Surgeon: Bg Watkins MD;  Location: [facility];  Service: Orthopedics       History reviewed  No pertinent family history  I have reviewed and agree with the history as documented  E-Cigarette/Vaping    E-Cigarette Use Never User      E-Cigarette/Vaping Substances    Nicotine No     THC No     CBD No     Flavoring No     Other No     Unknown No      Social History     Tobacco Use    Smoking status: Former Smoker     Types: Cigarettes     Quit date:      Years since quittin 4    Smokeless tobacco: Never Used   Vaping Use    Vaping Use: Never used   Substance Use Topics    Alcohol use: Never    Drug use: Never       Review of Systems   Constitutional: Negative for chills, fatigue and fever  HENT: Negative for dental problem and drooling  Eyes: Negative for discharge and redness     Musculoskeletal: Positive for arthralgias  Negative for neck pain and stiffness  Skin: Negative for color change, itching and rash  Neurological: Negative for weakness and numbness  Psychiatric/Behavioral: Negative for confusion  The patient is not nervous/anxious  All other systems reviewed and are negative  Physical Exam  Physical Exam  Vitals and nursing note reviewed  Constitutional:       General: She is not in acute distress  Appearance: Normal appearance  She is not toxic-appearing  HENT:      Head: Normocephalic and atraumatic  Right Ear: External ear normal       Left Ear: External ear normal    Eyes:      General: No scleral icterus  Right eye: No discharge  Left eye: No discharge  Conjunctiva/sclera: Conjunctivae normal    Cardiovascular:      Rate and Rhythm: Normal rate  Pulses:           Dorsalis pedis pulses are 2+ on the right side  Pulmonary:      Effort: Pulmonary effort is normal  No respiratory distress  Breath sounds: No stridor  Musculoskeletal:         General: No deformity  Normal range of motion  Cervical back: Normal range of motion  Right foot: Normal range of motion  No deformity  Feet:    Feet:      Comments: Right foot: No deformity, swelling, or skin changes  There is tenderness at the dorsum of the foot  ROM of ankle and toes intact  2+ DP pulse and sensation intact  Skin:     General: Skin is warm and dry  Neurological:      General: No focal deficit present  Mental Status: She is alert  Mental status is at baseline     Psychiatric:         Mood and Affect: Mood normal          Behavior: Behavior normal          Vital Signs  ED Triage Vitals [06/24/22 1227]   Temperature Pulse Respirations Blood Pressure SpO2   97 6 °F (36 4 °C) 91 18 (!) 171/78 99 %      Temp Source Heart Rate Source Patient Position - Orthostatic VS BP Location FiO2 (%)   Tympanic Monitor Sitting Left arm --      Pain Score       --           Vitals: 06/24/22 1227   BP: (!) 171/78   Pulse: 91   Patient Position - Orthostatic VS: Sitting         Visual Acuity      ED Medications  Medications - No data to display    Diagnostic Studies  Results Reviewed     None                 XR foot 3+ views RIGHT   ED Interpretation by Fouzia Escobar PA-C (06/24 1359)   No acute fracture      Final Result by Candi Fallon MD (06/24 5312)      Subacute/healing nondisplaced right 5th proximal phalangeal fracture  No acute osseous abnormality         The examination demonstrates a significant  finding and was documented as such in [facility] for liaison and referring practitioner immediate notification  Workstation performed: [id number]                    Procedures  Procedures         ED Course               Identification of Seniors at Risk    Judy Garner Most Recent Value   (ISAR) Identification of Seniors at Risk    Before the illness or injury that brought you to the Emergency, did you need someone to help you on a regular basis? 0 Filed at: 06/24/2022 1229   In the last 24 hours, have you needed more help than usual? 0 Filed at: 06/24/2022 1229   Have you been hospitalized for one or more nights during the past 6 months? 0 Filed at: 06/24/2022 1229   In general, do you see well? 0 Filed at: 06/24/2022 1229   In general, do you have serious problems with your memory? 0 Filed at: 06/24/2022 1229   Do you take more than three different medications every day? 0 Filed at: 06/24/2022 1229   ISAR Score 0 Filed at: 06/24/2022 1229                      SBIRT 20yo+    Flowsheet Row Most Recent Value   SBIRT (25 yo +)    In order to provide better care to our patients, we are screening all of our patients for alcohol and drug use  Would it be okay to ask you these screening questions? Yes Filed at: 06/24/2022 1406   Initial Alcohol Screen: US AUDIT-C     1  How often do you have a drink containing alcohol? 0 Filed at: 06/24/2022 1406   2   How many drinks containing alcohol do you have on a typical day you are drinking? 0 Filed at: 06/24/2022 1406   3a  Male UNDER 65: How often do you have five or more drinks on one occasion? 0 Filed at: 06/24/2022 1406   3b  FEMALE Any Age, or MALE 65+: How often do you have 4 or more drinks on one occassion? 0 Filed at: 06/24/2022 1406   Audit-C Score 0 Filed at: 06/24/2022 1406   ALEJANDRO: How many times in the past year have you    Used an illegal drug or used a prescription medication for non-medical reasons? Never Filed at: 06/24/2022 1406                    MDM  Number of Diagnoses or Management Options  Right foot pain: new and requires workup  Diagnosis management comments: 68yo female presenting for right foot pain x1 day  No known trauma  She has tenderness at the dorsum of the foot on exam without any swelling or skin changes  The right foot is neurovascularly intact  X-rays obtained which are negative for acute fracture  No indication for further workup at this time  Supportive care discussed including RICE and PRN Tylenol  Ace wrap applied  Advised follow-up with orthopedics if symptoms persist   Patient discharged in stable condition         Amount and/or Complexity of Data Reviewed  Tests in the radiology section of CPT®: ordered and reviewed  Independent visualization of images, tracings, or specimens: yes    Risk of Complications, Morbidity, and/or Mortality  Presenting problems: low  Diagnostic procedures: low  Management options: low    Patient Progress  Patient progress: stable      Disposition  Final diagnoses:   Right foot pain     Time reflects when diagnosis was documented in both MDM as applicable and the Disposition within this note     Time User Action Codes Description Comment    6/24/2022  2:04  [facility] [address], [address] Right foot pain       ED Disposition     ED Disposition   Discharge    Condition   Stable    Date/Time   Fri Jun 24, 2022  2:04 PM    Comment   Jose J Ford discharge to home/self care                Follow-up Information     Follow up With Specialties Details Why Contact Info Additional [address] [facility] [place] Orthopedic Surgery Schedule an appointment as soon as possible for a visit   [address] [facility]  Tyler Parikh [address] [phone] [facility] [place] [facility], [address] [address] [facility], [place], [place], [phone] [facility] Emergency Department Emergency Medicine  If symptoms worsen [address] Emergency Department, [address] [place] [address], [phone]          Discharge Medication List as of 6/24/2022  2:04 PM      CONTINUE these medications which have NOT CHANGED    Details   ATROVENT HFA 17 MCG/ACT inhaler 2 puffs 4 (four) times a day, Starting Fri 2/28/2020, Historical Med      benralizumab (FASENRA) subcutaneous injection Inject 30 mg under the skin, Historical Med      DALIRESP 500 MCG tablet Starting Fri 2/28/2020, Historical Med      gabapentin (NEURONTIN) 300 mg capsule Starting Sun 1/19/2020, Historical Med      ibuprofen (MOTRIN) 800 mg tablet ibuprofen 800 mg tablet, Historical Med      insulin aspart protamine-insulin aspart (NOVOLOG MIX 70/30) 100 units/mL injection Novolog Mix 70-30 U-100 Insulin 100 unit/mL subcutaneous solution, Historical Med      levocetirizine (XYZAL) 5 MG tablet levocetirizine 5 mg tablet, Historical Med      liraglutide (VICTOZA) injection Victoza 2-Jae 0 6 mg/0 1 mL (18 mg/3 mL) subcutaneous pen injector, Historical Med      metFORMIN (GLUCOPHAGE) 850 mg tablet Take 850 mg by mouth, Starting Wed 2/12/2020, Historical Med      montelukast (SINGULAIR) 10 mg tablet montelukast 10 mg tablet, Historical Med      predniSONE 10 mg tablet Take 1 tablet by mouth 2 (two) times a day, Historical Med      PROVENTIL  (90 Base) MCG/ACT inhaler Starting Wed 7/31/2019, Historical Med      SYMBICORT 160-4 5 MCG/ACT inhaler Starting Mon 7/22/2019, Historical Med      temazepam (RESTORIL) 30 mg capsule take 1 capsule by mouth nightly if needed for sleep, Historical Med             No discharge procedures on file      PDMP Review       Value Time User    PDMP Reviewed  Yes 11/21/2019 11:49 AM Paul Humphrey PA-C          ED Provider  Electronically Signed by           Nahum Rodriguez PA-C  06/24/22 7113

## 2022-09-13 ENCOUNTER — HOSPITAL ENCOUNTER (EMERGENCY)
Facility: HOSPITAL | Age: 67
Discharge: HOME | End: 2022-09-13
Attending: EMERGENCY MEDICINE
Payer: MEDICARE

## 2022-09-13 ENCOUNTER — APPOINTMENT (EMERGENCY)
Dept: CT IMAGING | Facility: HOSPITAL | Age: 67
End: 2022-09-13
Payer: MEDICARE

## 2022-09-13 ENCOUNTER — APPOINTMENT (OUTPATIENT)
Dept: RADIOLOGY | Facility: HOSPITAL | Age: 67
End: 2022-09-13
Payer: MEDICARE

## 2022-09-13 VITALS
RESPIRATION RATE: 17 BRPM | DIASTOLIC BLOOD PRESSURE: 74 MMHG | WEIGHT: 135 LBS | OXYGEN SATURATION: 96 % | BODY MASS INDEX: 28.34 KG/M2 | TEMPERATURE: 97.9 F | HEART RATE: 74 BPM | HEIGHT: 58 IN | SYSTOLIC BLOOD PRESSURE: 160 MMHG

## 2022-09-13 DIAGNOSIS — R07.9 LEFT-SIDED CHEST PAIN: Primary | ICD-10-CM

## 2022-09-13 LAB
2HR DELTA HS TROPONIN: >0 NG/L
ALBUMIN SERPL BCP-MCNC: 3.8 G/DL (ref 3.5–5)
ALP SERPL-CCNC: 76 U/L (ref 46–116)
ALT SERPL W P-5'-P-CCNC: 24 U/L (ref 12–78)
ANION GAP SERPL CALCULATED.3IONS-SCNC: 12 MMOL/L (ref 4–13)
APTT PPP: 29 SECONDS (ref 23–37)
AST SERPL W P-5'-P-CCNC: 14 U/L (ref 5–45)
BASOPHILS # BLD AUTO: 0.05 THOUSANDS/ΜL (ref 0–0.1)
BASOPHILS NFR BLD AUTO: 1 % (ref 0–1)
BILIRUB SERPL-MCNC: 0.47 MG/DL (ref 0.2–1)
BUN SERPL-MCNC: 10 MG/DL (ref 5–25)
CALCIUM SERPL-MCNC: 9.4 MG/DL (ref 8.3–10.1)
CARDIAC TROPONIN I PNL SERPL HS: 2 NG/L
CARDIAC TROPONIN I PNL SERPL HS: <2 NG/L
CHLORIDE SERPL-SCNC: 106 MMOL/L (ref 96–108)
CO2 SERPL-SCNC: 23 MMOL/L (ref 21–32)
CREAT SERPL-MCNC: 0.76 MG/DL (ref 0.6–1.3)
D DIMER PPP FEU-MCNC: 0.82 UG/ML FEU
EOSINOPHIL # BLD AUTO: 0.01 THOUSAND/ΜL (ref 0–0.61)
EOSINOPHIL NFR BLD AUTO: 0 % (ref 0–6)
ERYTHROCYTE [DISTWIDTH] IN BLOOD BY AUTOMATED COUNT: 14 % (ref 11.6–15.1)
GFR SERPL CREATININE-BSD FRML MDRD: 81 ML/MIN/1.73SQ M
GLUCOSE SERPL-MCNC: 221 MG/DL (ref 65–140)
HCT VFR BLD AUTO: 42.7 % (ref 34.8–46.1)
HGB BLD-MCNC: 13.5 G/DL (ref 11.5–15.4)
IMM GRANULOCYTES # BLD AUTO: 0.03 THOUSAND/UL (ref 0–0.2)
IMM GRANULOCYTES NFR BLD AUTO: 0 % (ref 0–2)
INR PPP: 0.99 (ref 0.84–1.19)
LYMPHOCYTES # BLD AUTO: 1.11 THOUSANDS/ΜL (ref 0.6–4.47)
LYMPHOCYTES NFR BLD AUTO: 14 % (ref 14–44)
MAGNESIUM SERPL-MCNC: 2 MG/DL (ref 1.6–2.6)
MCH RBC QN AUTO: 28.6 PG (ref 26.8–34.3)
MCHC RBC AUTO-ENTMCNC: 31.6 G/DL (ref 31.4–37.4)
MCV RBC AUTO: 91 FL (ref 82–98)
MONOCYTES # BLD AUTO: 0.27 THOUSAND/ΜL (ref 0.17–1.22)
MONOCYTES NFR BLD AUTO: 3 % (ref 4–12)
NEUTROPHILS # BLD AUTO: 6.5 THOUSANDS/ΜL (ref 1.85–7.62)
NEUTS SEG NFR BLD AUTO: 82 % (ref 43–75)
NRBC BLD AUTO-RTO: 0 /100 WBCS
PLATELET # BLD AUTO: 275 THOUSANDS/UL (ref 149–390)
PMV BLD AUTO: 10.1 FL (ref 8.9–12.7)
POTASSIUM SERPL-SCNC: 3.7 MMOL/L (ref 3.5–5.3)
PROT SERPL-MCNC: 6.9 G/DL (ref 6.4–8.4)
PROTHROMBIN TIME: 12.9 SECONDS (ref 11.6–14.5)
RBC # BLD AUTO: 4.72 MILLION/UL (ref 3.81–5.12)
SODIUM SERPL-SCNC: 141 MMOL/L (ref 135–147)
WBC # BLD AUTO: 7.97 THOUSAND/UL (ref 4.31–10.16)

## 2022-09-13 PROCEDURE — 71275 CT ANGIOGRAPHY CHEST: CPT

## 2022-09-13 PROCEDURE — 71045 X-RAY EXAM CHEST 1 VIEW: CPT

## 2022-09-13 PROCEDURE — 36415 COLL VENOUS BLD VENIPUNCTURE: CPT

## 2022-09-13 PROCEDURE — 93005 ELECTROCARDIOGRAM TRACING: CPT

## 2022-09-13 PROCEDURE — 83735 ASSAY OF MAGNESIUM: CPT | Performed by: EMERGENCY MEDICINE

## 2022-09-13 PROCEDURE — 94640 AIRWAY INHALATION TREATMENT: CPT

## 2022-09-13 PROCEDURE — 80053 COMPREHEN METABOLIC PANEL: CPT | Performed by: EMERGENCY MEDICINE

## 2022-09-13 PROCEDURE — G1004 CDSM NDSC: HCPCS

## 2022-09-13 PROCEDURE — 99285 EMERGENCY DEPT VISIT HI MDM: CPT

## 2022-09-13 PROCEDURE — 84484 ASSAY OF TROPONIN QUANT: CPT | Performed by: EMERGENCY MEDICINE

## 2022-09-13 PROCEDURE — 85379 FIBRIN DEGRADATION QUANT: CPT | Performed by: EMERGENCY MEDICINE

## 2022-09-13 PROCEDURE — 85730 THROMBOPLASTIN TIME PARTIAL: CPT | Performed by: EMERGENCY MEDICINE

## 2022-09-13 PROCEDURE — 85025 COMPLETE CBC W/AUTO DIFF WBC: CPT | Performed by: EMERGENCY MEDICINE

## 2022-09-13 PROCEDURE — 99285 EMERGENCY DEPT VISIT HI MDM: CPT | Performed by: EMERGENCY MEDICINE

## 2022-09-13 PROCEDURE — 85610 PROTHROMBIN TIME: CPT | Performed by: EMERGENCY MEDICINE

## 2022-09-13 PROCEDURE — 96360 HYDRATION IV INFUSION INIT: CPT

## 2022-09-13 RX ORDER — LIDOCAINE 50 MG/G
1 PATCH TOPICAL ONCE
Status: DISCONTINUED | OUTPATIENT
Start: 2022-09-13 | End: 2022-09-13 | Stop reason: HOSPADM

## 2022-09-13 RX ORDER — ASPIRIN 81 MG/1
324 TABLET, CHEWABLE ORAL ONCE
Status: COMPLETED | OUTPATIENT
Start: 2022-09-13 | End: 2022-09-13

## 2022-09-13 RX ADMIN — LIDOCAINE 5% 1 PATCH: 700 PATCH TOPICAL at 18:26

## 2022-09-13 RX ADMIN — SODIUM CHLORIDE 500 ML: 0.9 INJECTION, SOLUTION INTRAVENOUS at 18:37

## 2022-09-13 RX ADMIN — ASPIRIN 324 MG: 81 TABLET, CHEWABLE ORAL at 18:25

## 2022-09-13 RX ADMIN — ALBUTEROL SULFATE 5 MG: 2.5 SOLUTION RESPIRATORY (INHALATION) at 18:26

## 2022-09-13 RX ADMIN — IPRATROPIUM BROMIDE 0.5 MG: 0.5 SOLUTION RESPIRATORY (INHALATION) at 18:25

## 2022-09-13 RX ADMIN — IOHEXOL 85 ML: 350 INJECTION, SOLUTION INTRAVENOUS at 19:15

## 2022-09-13 NOTE — ED PROVIDER NOTES
History  Chief Complaint   Patient presents with    Chest Pain     Patient c/o mid center chest pain that started about an hour ago  Patient denies dizziness  Patient c/o sob  Patient is a 51-year-old female with past medical history of asthma/COPD, insulin-dependent diabetes, presents to the emergency department complaining of left-sided chest pain that started approximately 2-3 hours ago  Patient states she was cooking dinner when she developed in aching pain in her left lower chest   She states the pain has been constant, nonradiating and is worse when she takes a deep breath  She denies any worsening with exertion however has felt mildly more short of breath since the pain started  She attributed that to her asthma acting up  She states she was lightheaded when it 1st happened but denies any lightheadedness or dizziness currently  She does report mild headache currently  Denies any diaphoresis, change in vision or hearing, neck or jaw pain, cough, hemoptysis, URI symptoms, palpitations, abdominal pain, back pain, nausea, vomiting, diarrhea, constipation, urinary symptoms, skin rash or color change, extremity swelling or pain, extremity weakness or paresthesia or other focal neurologic deficits  She smokes about 1 pack of cigarettes per month  Denies any known history of coronary artery disease or venous thromboembolism  Patient reports she does a lot of activity at home and 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 Taking?    ATROVENT HFA 17 MCG/ACT inhaler  Self Yes No   Si puffs 4 (four) times a day   DALIRESP 500 MCG tablet  Self Yes No   PROVENTIL  (90 Base) MCG/ACT inhaler  Self Yes No   SYMBICORT 160-4 5 MCG/ACT inhaler  Self Yes No   benralizumab (FASENRA) subcutaneous injection  Self Yes No   Sig: Inject 30 mg under the skin   gabapentin (NEURONTIN) 300 mg capsule  Self Yes No   ibuprofen (MOTRIN) 800 mg tablet  Self Yes No   Sig: ibuprofen 800 mg tablet   insulin aspart protamine-insulin aspart (NOVOLOG MIX 70/30) 100 units/mL injection  Self Yes No   Sig: Novolog Mix 70-30 U-100 Insulin 100 unit/mL subcutaneous solution   levocetirizine (XYZAL) 5 MG tablet  Self Yes No   Sig: levocetirizine 5 mg tablet   liraglutide (VICTOZA) injection  Self Yes No   Sig: Victoza 2-Jae 0 6 mg/0 1 mL (18 mg/3 mL) subcutaneous pen injector   metFORMIN (GLUCOPHAGE) 850 mg tablet  Self Yes No   Sig: Take 850 mg by mouth   montelukast (SINGULAIR) 10 mg tablet  Self Yes No   Sig: montelukast 10 mg tablet   predniSONE 10 mg tablet  Self Yes No   Sig: Take 1 tablet by mouth 2 (two) times a day   temazepam (RESTORIL) 30 mg capsule  Self Yes No   Sig: take 1 capsule by mouth nightly if needed for sleep      Facility-Administered Medications: None       Past Medical History:   Diagnosis Date    Asthma     [facility]ch[facility] obstructive [facility] disease[facility] [address]     [facility]        Past Surgical History:   Procedure Laterality Date    HERNIA REPAIR      RI EXCIS PRIMARY GANGLION WRIST Left 1/10/2020    Procedure: DORSAL WRIST GANGLION CYST EXCISION; DISTAL RADIUS CYST EXCISION;  Surgeon: Leo Holbrook MD;  Location: [facility];  Service: Orthopedics    RI INCIS TENDON SHEATH,RADIAL STYLOID Left 1/10/2020    Procedure: Alex Baugh;  Surgeon: Leo Holbrook MD;  Location: [facility];  Service: Orthopedics       No family history on file  I have reviewed and agree with the history as documented      E-Cigarette/Vaping    E-Cigarette Use Never User E-Cigarette/Vaping Substances    Nicotine No     THC No     CBD No     Flavoring No     Other No     Unknown No      Social History     Tobacco Use    Smoking status: Former Smoker     Types: Cigarettes     Quit date:      Years since quittin 7    Smokeless tobacco: Never Used   Vaping Use    Vaping Use: Never used   Substance Use Topics    Alcohol use: Never    Drug use: Never       Review of Systems   Constitutional: Negative for chills, diaphoresis and fever  HENT: Negative for congestion, ear pain, rhinorrhea and sore throat  Eyes: Negative for photophobia, pain and visual disturbance  Respiratory: Positive for shortness of breath  Negative for cough, chest tightness and wheezing  Cardiovascular: Positive for chest pain  Negative for palpitations and leg swelling  Gastrointestinal: Negative for abdominal pain, constipation, diarrhea, nausea and vomiting  Genitourinary: Negative for dysuria, flank pain, frequency and hematuria  Musculoskeletal: Negative for back pain, neck pain and neck stiffness  Skin: Negative for color change, pallor, rash and wound  Allergic/Immunologic: Negative for immunocompromised state  Neurological: Positive for headaches  Negative for dizziness, syncope, weakness, light-headedness and numbness  Hematological: Negative for adenopathy  Does not bruise/bleed easily  Psychiatric/Behavioral: Negative for confusion and decreased concentration  All other systems reviewed and are negative  Physical Exam  Physical Exam  Vitals and nursing note reviewed  Constitutional:       General: She is not in acute distress  Appearance: Normal appearance  She is well-developed  She is not ill-appearing, toxic-appearing or diaphoretic  HENT:      Head: Normocephalic and atraumatic        Right Ear: External ear normal       Left Ear: External ear normal       Mouth/Throat:      Comments: Orpharyngeal exam deferred at this time due to risk of exposure to COVID-19 during current pandemic  Patient has no oropharyngeal complaints  Eyes:      Extraocular Movements: Extraocular movements intact  Conjunctiva/sclera: Conjunctivae normal    Neck:      Vascular: No JVD  Cardiovascular:      Rate and Rhythm: Normal rate and regular rhythm  Pulses: Normal pulses  Heart sounds: Normal heart sounds  No murmur heard  No friction rub  No gallop  Pulmonary:      Effort: Pulmonary effort is normal  No respiratory distress  Breath sounds: Wheezing present  No rhonchi or rales  Comments: Left anterior chest wall tenderness  Decreased air movement throughout with very faint end-expiratory wheezing  Chest:      Chest wall: Tenderness present  Abdominal:      General: There is no distension  Palpations: Abdomen is soft  Tenderness: There is no abdominal tenderness  There is no guarding or rebound  Musculoskeletal:         General: No swelling or tenderness  Normal range of motion  Cervical back: Normal range of motion and neck supple  No rigidity  Right lower leg: No edema  Left lower leg: No edema  Skin:     General: Skin is warm and dry  Coloration: Skin is not pale  Findings: No erythema or rash  Neurological:      General: No focal deficit present  Mental Status: She is alert and oriented to person, place, and time  Cranial Nerves: No cranial nerve deficit  Sensory: No sensory deficit  Motor: No weakness     Psychiatric:         Mood and Affect: Mood normal          Behavior: Behavior normal          Vital Signs  ED Triage Vitals [09/13/22 1557]   Temperature Pulse Respirations Blood Pressure SpO2   97 9 °F (36 6 °C) 92 20 156/71 96 %      Temp Source Heart Rate Source Patient Position - Orthostatic VS BP Location FiO2 (%)   Oral Monitor Sitting Left arm --      Pain Score       --         Vitals:    09/13/22 1557 09/13/22 1837 09/13/22 2000   BP: 156/71 (!) 177/77 160/74   BP Location: Left arm     Pulse: 92 67 74   Resp: 20 17 17   Temp: 97 9 °F (36 6 °C)     TempSrc: Oral     SpO2: 96% 100% 96%   Weight: 61 2 kg (135 lb)     Height: 4' 10" (1 473 m)         Visual Acuity      ED Medications  Medications   sodium chloride 0 9 % bolus 500 mL (500 mL Intravenous New Bag 9/13/22 1837)   lidocaine (LIDODERM) 5 % patch 1 patch (1 patch Topical Medication Applied 9/13/22 1826)   aspirin chewable tablet 324 mg (324 mg Oral Given 9/13/22 1825)   ipratropium (ATROVENT) 0 02 % inhalation solution 0 5 mg (0 5 mg Nebulization Given 9/13/22 1825)   albuterol inhalation solution 5 mg (5 mg Nebulization Given 9/13/22 1826)   iohexol (OMNIPAQUE) 350 MG/ML injection (SINGLE-DOSE) 85 mL (85 mL Intravenous Given 9/13/22 1915)       Diagnostic Studies  Results Reviewed     Procedure Component Value Units Date/Time    HS Troponin I 2hr [[id number]]  (Normal) Collected: 09/13/22 1834    Lab Status: Final result Specimen: Blood from Arm, Right Updated: 09/13/22 1927     hs TnI 2hr 2 ng/L      Delta 2hr hsTnI >0 ng/L     Magnesium [[id number]]  (Normal) Collected: 09/13/22 1601    Lab Status: Final result Specimen: Blood from Arm, Right Updated: 09/13/22 1749     Magnesium 2 0 mg/dL     D-dimer, quantitative [[id number]]  (Abnormal) Collected: 09/13/22 1601    Lab Status: Final result Specimen: Blood from Arm, Right Updated: 09/13/22 1749     D-Dimer, Quant 0 82 ug/ml FEU     Narrative: In the evaluation for possible pulmonary embolism, in the appropriate (Well's Score of 4 or less) patient, the age adjusted d-dimer cutoff for this patient can be calculated as:    Age x 0 01 (in ug/mL) for Age-adjusted D-dimer exclusion threshold for a patient over 50 years      HS Troponin 0hr (reflex protocol) [[id number]]  (Normal) Collected: 09/13/22 1601    Lab Status: Final result Specimen: Blood from Arm, Right Updated: 09/13/22 1644     hs TnI 0hr <2 ng/L     Comprehensive metabolic panel [[id number]]  (Abnormal) Collected: 09/13/22 1601    Lab Status: Final result Specimen: Blood from Arm, Right Updated: 09/13/22 1640     Sodium 141 mmol/L      Potassium 3 7 mmol/L      Chloride 106 mmol/L      CO2 23 mmol/L      ANION GAP 12 mmol/L      BUN 10 mg/dL      Creatinine 0 76 mg/dL      Glucose 221 mg/dL      Calcium 9 4 mg/dL      AST 14 U/L      ALT 24 U/L      Alkaline Phosphatase 76 U/L      Total Protein 6 9 g/dL      Albumin 3 8 g/dL      Total Bilirubin 0 47 mg/dL      eGFR 81 ml/min/1 73sq m     Narrative:      National Kidney Disease Foundation guidelines for Chronic Kidney Disease (CKD):     Stage 1 with normal or high GFR (GFR > 90 mL/min/1 73 square meters)    Stage 2 Mild CKD (GFR = 60-89 mL/min/1 73 square meters)    Stage 3A Moderate CKD (GFR = 45-59 mL/min/1 73 square meters)    Stage 3B Moderate CKD (GFR = 30-44 mL/min/1 73 square meters)    Stage 4 Severe CKD (GFR = 15-29 mL/min/1 73 square meters)    Stage 5 End Stage CKD (GFR <15 mL/min/1 73 square meters)  Note: GFR calculation is accurate only with a steady state creatinine    APTT [[id number]]  (Normal) Collected: 09/13/22 1601    Lab Status: Final result Specimen: Blood from Arm, Right Updated: 09/13/22 1635     PTT 29 seconds     Protime-INR [[id number]]  (Normal) Collected: 09/13/22 1601    Lab Status: Final result Specimen: Blood from Arm, Right Updated: 09/13/22 1635     Protime 12 9 seconds      INR 0 99    CBC and differential [[id number]]  (Abnormal) Collected: 09/13/22 1601    Lab Status: Final result Specimen: Blood from Arm, Right Updated: 09/13/22 1606     WBC 7 97 Thousand/uL      RBC 4 72 Million/uL      Hemoglobin 13 5 g/dL      Hematocrit 42 7 %      MCV 91 fL      MCH 28 6 pg      MCHC 31 6 g/dL      RDW 14 0 %      MPV 10 1 fL      Platelets 215 Thousands/uL      nRBC 0 /100 WBCs      Neutrophils Relative 82 %      Immat GRANS % 0 %      Lymphocytes Relative 14 %      Monocytes Relative 3 %      Eosinophils Relative 0 %      Basophils Relative 1 %      Neutrophils Absolute 6 50 Thousands/µL      Immature Grans Absolute 0 03 Thousand/uL      Lymphocytes Absolute 1 11 Thousands/µL      Monocytes Absolute 0 27 Thousand/µL      Eosinophils Absolute 0 01 Thousand/µL      Basophils Absolute 0 05 Thousands/µL                  CTA ED chest PE study   Final Result by Savita Blancas MD (1955)      No pulmonary embolus  Mild diffuse bronchial wall thickening compatible with asthma and/or smoking related bronchitis  Workstation performed: [id number]         XR chest 1 view portable   ED Interpretation by Leslye Lazo DO (09/13 1822)   No acute abnormality in the chest                  Procedures  ECG 12 Lead Documentation Only    Date/Time: 9/13/2022 4:00 PM  Performed by: Leslye Lazo DO  Authorized by: Leslye Lazo DO     ECG reviewed by me, the ED Provider: yes    Patient location:  ED  Previous ECG:     Previous ECG:  Unavailable  Interpretation:     Interpretation: normal    Rate:     ECG rate:  80    ECG rate assessment: normal    Rhythm:     Rhythm: sinus rhythm    Ectopy:     Ectopy: none    QRS:     QRS axis:  Normal    QRS intervals:  Normal  Conduction:     Conduction: normal    ST segments:     ST segments:  Normal  T waves:     T waves: normal               ED Course  ED Course as of 09/13/22 2014   Tue Sep 13, 2022   1732 hs TnI 0hr: <2   7802 D-Dimer, Quant(!): 0 82   1815 Will obtain CTA chest to definitively rule out acute PE    2009 Updated patient about normal workup  Patient feeling better overall  She states the pain is improved and is only coming and going as opposed to being constant  I advised her to follow-up with her PCP  Discussed symptomatic management at home and when to return to the ER               HEART Risk Score    Flowsheet Row Most Recent Value   Heart Score Risk Calculator    History 0 Filed at: 09/13/2022 1739   ECG 0 Filed at: 09/13/2022 1739   Age 2 Filed at: 09/13/2022 1739   Risk Factors 1 Filed at: 09/13/2022 1739   Troponin 0 Filed at: 09/13/2022 1739   HEART Score 3 Filed at: 09/13/2022 1739                PERC Rule for PE    Flowsheet Row Most Recent Value   PERC Rule for PE    Age >=50 1 Filed at: 09/13/2022 1740   HR >=100 0 Filed at: 09/13/2022 1740   O2 Sat on room air < 95% 0 Filed at: 09/13/2022 1740   History of PE or DVT 0 Filed at: 09/13/2022 1740   Recent trauma or surgery 0 Filed at: 09/13/2022 1740   Hemoptysis 0 Filed at: 09/13/2022 1740   Exogenous estrogen 0 Filed at: 09/13/2022 1740   Unilateral leg swelling 0 Filed at: 09/13/2022 1740   PERC Rule for PE Results 1 Filed at: 09/13/2022 1740                  Wells' Criteria for PE    Flowsheet Row Most Recent Value   Wells' Criteria for PE    Clinical signs and symptoms of DVT 0 Filed at: 09/13/2022 1740   PE is primary diagnosis or equally likely 0 Filed at: 09/13/2022 1740   HR >100 0 Filed at: 09/13/2022 1740   Immobilization at least 3 days or Surgery in the previous 4 weeks 0 Filed at: 09/13/2022 1740   Previous, objectively diagnosed PE or DVT 0 Filed at: 09/13/2022 1740   Hemoptysis 0 Filed at: 09/13/2022 1740   Malignancy with treatment within 6 months or palliative 0 Filed at: 09/13/2022 1740   Wells' Criteria Total 0 Filed at: 09/13/2022 1740                MDM  Number of Diagnoses or Management Options  Diagnosis management comments: 63-year-old female with history of asthma diabetes presents to the ED for acute left chest pain and dyspnea that started several hours ago  Patient had initial cardiac workup started in triage in thus far unremarkable with normal pollen  Will add on D-dimer to rule out acute PE but most likely this is a for a musculoskeletal pain especially given the chest wall tenderness and aching quality  Given that the duration of the pain has only been a few hours, will keep for 2 hour repeat troponin/EKG  Will treat with aspirin, lidocaine patch    Will also give breathing treatment for dyspnea  Amount and/or Complexity of Data Reviewed  Clinical lab tests: reviewed and ordered  Tests in the radiology section of CPT®: ordered and reviewed  Tests in the medicine section of CPT®: ordered and reviewed  Independent visualization of images, tracings, or specimens: yes        Disposition  Final diagnoses:   Left-sided chest pain     Time reflects when diagnosis was documented in both MDM as applicable and the Disposition within this note     Time User Action Codes Description Comment    9/13/2022  8:13 PM Angelica Zavala [R07 [id number]] Left-sided chest pain       ED Disposition     ED Disposition   Discharge    Condition   Stable    Date/Time   Tue Sep 13, 2022  8:13 PM    Comment   Levi Lopez discharge to home/self care  Follow-up Information     Follow up With Specialties Details Why Contact Info Additional Information    Magda Thompson MD  Schedule an appointment as soon as possible for a visit   [address] [phone]       [id number] [facility] Emergency Department Emergency Medicine Go to  If symptoms worsen [address] Emergency Department, [address] [place] [address], [facility]          Patient's Medications   Discharge Prescriptions    No medications on file       No discharge procedures on file      PDMP Review       Value Time User    PDMP Reviewed  Yes 11/21/2019 11:49 AM Monica Aviles PA-C          ED Provider  Electronically Signed by           Zak Gonzales DO  09/13/22 2014

## 2022-09-14 LAB
ATRIAL RATE: 80 BPM
P AXIS: 79 DEGREES
PR INTERVAL: 160 MS
QRS AXIS: 82 DEGREES
QRSD INTERVAL: 76 MS
QT INTERVAL: 368 MS
QTC INTERVAL: 424 MS
T WAVE AXIS: 55 DEGREES
VENTRICULAR RATE: 80 BPM

## 2022-09-14 PROCEDURE — 93010 ELECTROCARDIOGRAM REPORT: CPT | Performed by: INTERNAL MEDICINE

## 2023-02-11 ENCOUNTER — APPOINTMENT (EMERGENCY)
Dept: CT IMAGING | Facility: HOSPITAL | Age: 68
End: 2023-02-11

## 2023-02-11 ENCOUNTER — APPOINTMENT (EMERGENCY)
Dept: RADIOLOGY | Facility: HOSPITAL | Age: 68
End: 2023-02-11

## 2023-02-11 ENCOUNTER — HOSPITAL ENCOUNTER (EMERGENCY)
Facility: HOSPITAL | Age: 68
Discharge: HOME | End: 2023-02-12
Attending: EMERGENCY MEDICINE

## 2023-02-11 DIAGNOSIS — R10.12 LEFT UPPER QUADRANT ABDOMINAL PAIN: Primary | ICD-10-CM

## 2023-02-11 LAB
2HR DELTA HS TROPONIN: 0 NG/L
ALBUMIN SERPL BCP-MCNC: 3.5 G/DL (ref 3.5–5)
ALP SERPL-CCNC: 76 U/L (ref 46–116)
ALT SERPL W P-5'-P-CCNC: 24 U/L (ref 12–78)
ANION GAP SERPL CALCULATED.3IONS-SCNC: 8 MMOL/L (ref 4–13)
AST SERPL W P-5'-P-CCNC: 13 U/L (ref 5–45)
BASOPHILS # BLD AUTO: 0.06 THOUSANDS/ÂΜL (ref 0–0.1)
BASOPHILS NFR BLD AUTO: 1 % (ref 0–1)
BILIRUB SERPL-MCNC: 0.25 MG/DL (ref 0.2–1)
BUN SERPL-MCNC: 10 MG/DL (ref 5–25)
CALCIUM SERPL-MCNC: 8.9 MG/DL (ref 8.3–10.1)
CARDIAC TROPONIN I PNL SERPL HS: 3 NG/L
CARDIAC TROPONIN I PNL SERPL HS: 3 NG/L
CHLORIDE SERPL-SCNC: 106 MMOL/L (ref 96–108)
CO2 SERPL-SCNC: 28 MMOL/L (ref 21–32)
CREAT SERPL-MCNC: 0.6 MG/DL (ref 0.6–1.3)
EOSINOPHIL # BLD AUTO: 0.1 THOUSAND/ÂΜL (ref 0–0.61)
EOSINOPHIL NFR BLD AUTO: 1 % (ref 0–6)
ERYTHROCYTE [DISTWIDTH] IN BLOOD BY AUTOMATED COUNT: 13.8 % (ref 11.6–15.1)
GFR SERPL CREATININE-BSD FRML MDRD: 94 ML/MIN/1.73SQ M
GLUCOSE SERPL-MCNC: 186 MG/DL (ref 65–140)
HCT VFR BLD AUTO: 40.9 % (ref 34.8–46.1)
HGB BLD-MCNC: 13.4 G/DL (ref 11.5–15.4)
IMM GRANULOCYTES # BLD AUTO: 0.07 THOUSAND/UL (ref 0–0.2)
IMM GRANULOCYTES NFR BLD AUTO: 1 % (ref 0–2)
LIPASE SERPL-CCNC: 82 U/L (ref 73–393)
LYMPHOCYTES # BLD AUTO: 2.14 THOUSANDS/ÂΜL (ref 0.6–4.47)
LYMPHOCYTES NFR BLD AUTO: 21 % (ref 14–44)
MCH RBC QN AUTO: 29.1 PG (ref 26.8–34.3)
MCHC RBC AUTO-ENTMCNC: 32.8 G/DL (ref 31.4–37.4)
MCV RBC AUTO: 89 FL (ref 82–98)
MONOCYTES # BLD AUTO: 0.78 THOUSAND/ÂΜL (ref 0.17–1.22)
MONOCYTES NFR BLD AUTO: 8 % (ref 4–12)
NEUTROPHILS # BLD AUTO: 6.84 THOUSANDS/ÂΜL (ref 1.85–7.62)
NEUTS SEG NFR BLD AUTO: 68 % (ref 43–75)
NRBC BLD AUTO-RTO: 0 /100 WBCS
PLATELET # BLD AUTO: 263 THOUSANDS/UL (ref 149–390)
PMV BLD AUTO: 9.8 FL (ref 8.9–12.7)
POTASSIUM SERPL-SCNC: 3.1 MMOL/L (ref 3.5–5.3)
PROT SERPL-MCNC: 6.4 G/DL (ref 6.4–8.4)
RBC # BLD AUTO: 4.61 MILLION/UL (ref 3.81–5.12)
SODIUM SERPL-SCNC: 142 MMOL/L (ref 135–147)
WBC # BLD AUTO: 9.99 THOUSAND/UL (ref 4.31–10.16)

## 2023-02-11 RX ORDER — ALBUTEROL SULFATE 2.5 MG/3ML
5 SOLUTION RESPIRATORY (INHALATION) ONCE
Status: COMPLETED | OUTPATIENT
Start: 2023-02-11 | End: 2023-02-11

## 2023-02-11 RX ORDER — HYDROMORPHONE HCL/PF 1 MG/ML
1 SYRINGE (ML) INJECTION ONCE
Status: COMPLETED | OUTPATIENT
Start: 2023-02-11 | End: 2023-02-11

## 2023-02-11 RX ADMIN — HYDROMORPHONE HYDROCHLORIDE 1 MG: 1 INJECTION, SOLUTION INTRAMUSCULAR; INTRAVENOUS; SUBCUTANEOUS at 21:47

## 2023-02-11 RX ADMIN — IPRATROPIUM BROMIDE 0.5 MG: 0.5 SOLUTION RESPIRATORY (INHALATION) at 21:50

## 2023-02-11 RX ADMIN — IOHEXOL 100 ML: 350 INJECTION, SOLUTION INTRAVENOUS at 23:01

## 2023-02-11 RX ADMIN — ALBUTEROL SULFATE 5 MG: 2.5 SOLUTION RESPIRATORY (INHALATION) at 21:50

## 2023-02-12 VITALS
SYSTOLIC BLOOD PRESSURE: 159 MMHG | HEART RATE: 77 BPM | TEMPERATURE: 98.7 F | OXYGEN SATURATION: 97 % | RESPIRATION RATE: 16 BRPM | DIASTOLIC BLOOD PRESSURE: 78 MMHG

## 2023-02-12 LAB
ATRIAL RATE: 91 BPM
P AXIS: 83 DEGREES
PR INTERVAL: 166 MS
QRS AXIS: 77 DEGREES
QRSD INTERVAL: 78 MS
QT INTERVAL: 334 MS
QTC INTERVAL: 410 MS
T WAVE AXIS: 53 DEGREES
VENTRICULAR RATE: 91 BPM

## 2023-02-12 NOTE — ED PROVIDER NOTES
History  Chief Complaint   Patient presents with   • Chest Pain     C/o cp for the pat 4 days, states she has asthma, but the pain doesn't feel the same  Patient is a 60-year-old female with a past medical history significant for COPD, diabetes presenting to the emergency department for evaluation of left upper quadrant abdominal pain  10 out of 10 in severity  Pain is intermittent  No exacerbating or alleviating factors  Pain does not radiate  She is not having any fevers, chills, chest pain, difficulty breathing, nausea, vomiting, diarrhea  No previous abdominal surgeries  No dysuria/hematuria/flank pain  No other complaints at this time  Prior to Admission Medications   Prescriptions Last Dose Informant Patient Reported? Taking?    ATROVENT HFA 17 MCG/ACT inhaler   Yes No   Si puffs 4 (four) times a day   DALIRESP 500 MCG tablet   Yes No   Patient not taking: Reported on 2/15/2023   PROVENTIL  (90 Base) MCG/ACT inhaler   Yes No   SYMBICORT 160-4 5 MCG/ACT inhaler   Yes No   benralizumab (FASENRA) subcutaneous injection   Yes No   Sig: Inject 30 mg under the skin   gabapentin (NEURONTIN) 300 mg capsule   Yes No   ibuprofen (MOTRIN) 800 mg tablet   Yes No   Sig: ibuprofen 800 mg tablet   insulin aspart protamine-insulin aspart (NovoLOG 70/30) 100 units/mL injection   Yes No   Sig: Novolog Mix 70-30 U-100 Insulin 100 unit/mL subcutaneous solution   levocetirizine (XYZAL) 5 MG tablet   Yes No   Sig: levocetirizine 5 mg tablet   liraglutide (VICTOZA) injection   Yes No   Sig: Victoza 2-Jae 0 6 mg/0 1 mL (18 mg/3 mL) subcutaneous pen injector   metFORMIN (GLUCOPHAGE) 850 mg tablet   Yes No   Sig: Take 850 mg by mouth   montelukast (SINGULAIR) 10 mg tablet   Yes No   Sig: montelukast 10 mg tablet   predniSONE 10 mg tablet   Yes No   Sig: Take 1 tablet by mouth 2 (two) times a day   temazepam (RESTORIL) 30 mg capsule   Yes No   Sig: take 1 capsule by mouth nightly if needed for sleep Facility-Administered Medications: None       Past Medical History:   Diagnosis Date   • Asthma    • COPD (chronic obstructive pulmonary disease) ([facility] [address] )    • Diabetes mellitus ([facility] [address] )        Past Surgical History:   Procedure Laterality Date   • HERNIA REPAIR     • [place] EXCISION GANGLION WRIST DORSAL/VOLAR PRIMARY Left 1/10/2020    Procedure: DORSAL WRIST GANGLION CYST EXCISION; DISTAL RADIUS CYST EXCISION;  Surgeon: Josue Tuttle MD;  Location: [facility];  Service: Orthopedics   • [place] INCISION EXTENSOR TENDON SHEATH WRIST Left 1/10/2020    Procedure: Marc Johnson;  Surgeon: Josue Tuttle MD;  Location: [facility];  Service: Orthopedics       History reviewed  No pertinent family history  I have reviewed and agree with the history as documented  E-Cigarette/Vaping   • E-Cigarette Use Never User      E-Cigarette/Vaping Substances   • Nicotine No    • THC No    • CBD No    • Flavoring No    • Other No    • Unknown No      Social History     Tobacco Use   • Smoking status: Former     Types: Cigarettes     Quit date:      Years since quittin 1   • Smokeless tobacco: Never   Vaping Use   • Vaping Use: Never used   Substance Use Topics   • Alcohol use: Never   • Drug use: Never       Review of Systems   Constitutional: Negative for chills and fever  HENT: Negative for congestion, facial swelling, nosebleeds, sore throat and voice change  Eyes: Negative for pain and redness  Respiratory: Negative for cough, choking, chest tightness, shortness of breath and stridor  Cardiovascular: Negative for chest pain and palpitations  Gastrointestinal: Positive for abdominal pain  Negative for diarrhea, nausea and vomiting  Genitourinary: Negative for difficulty urinating, dysuria, flank pain and hematuria  Musculoskeletal: Negative for arthralgias, back pain, myalgias, neck pain and neck stiffness  Skin: Negative for color change and rash     Neurological: Negative for dizziness, syncope, facial asymmetry, weakness, light-headedness, numbness and headaches  Psychiatric/Behavioral: Negative for confusion and suicidal ideas  All other systems reviewed and are negative  Physical Exam  Physical Exam  Vitals reviewed  Constitutional:       General: She is not in acute distress  Appearance: Normal appearance  She is obese  She is not ill-appearing, toxic-appearing or diaphoretic  HENT:      Head: Normocephalic and atraumatic  Right Ear: External ear normal       Left Ear: External ear normal       Nose: Nose normal  No congestion or rhinorrhea  Mouth/Throat:      Mouth: Mucous membranes are moist       Pharynx: Oropharynx is clear  No oropharyngeal exudate or posterior oropharyngeal erythema  Eyes:      General: No scleral icterus  Right eye: No discharge  Left eye: No discharge  Extraocular Movements: Extraocular movements intact  Conjunctiva/sclera: Conjunctivae normal    Cardiovascular:      Rate and Rhythm: Normal rate and regular rhythm  Pulses: Normal pulses  Heart sounds: Normal heart sounds  No murmur heard  No friction rub  No gallop  Pulmonary:      Effort: Pulmonary effort is normal  No respiratory distress  Breath sounds: No stridor  Wheezing (scattered) present  No rhonchi or rales  Abdominal:      General: Abdomen is flat  Palpations: Abdomen is soft  Tenderness: There is abdominal tenderness (LUQ)  There is guarding  There is no rebound  Musculoskeletal:      Cervical back: Normal range of motion and neck supple  Right lower leg: No edema  Left lower leg: No edema  Skin:     General: Skin is warm and dry  Capillary Refill: Capillary refill takes less than 2 seconds  Neurological:      General: No focal deficit present  Mental Status: She is alert and oriented to person, place, and time     Psychiatric:         Mood and Affect: Mood normal          Behavior: Behavior normal          Vital Signs  ED Triage Vitals   Temperature Pulse Respirations Blood Pressure SpO2   02/11/23 1933 02/11/23 1933 02/11/23 1933 02/11/23 1933 02/11/23 1933   98 7 °F (37 1 °C) 86 18 170/91 98 %      Temp Source Heart Rate Source Patient Position - Orthostatic VS BP Location FiO2 (%)   02/11/23 1933 02/11/23 1933 02/11/23 1933 02/11/23 1933 --   Oral Monitor Sitting Left arm       Pain Score       02/11/23 2145       10 - Worst Possible Pain           Vitals:    02/11/23 1933 02/11/23 2145 02/11/23 2345 02/12/23 0000   BP: 170/91 (!) 174/82 152/72 159/78   Pulse: 86 77 76 77   Patient Position - Orthostatic VS: Sitting Sitting Lying Sitting         Visual Acuity      ED Medications  Medications   HYDROmorphone (DILAUDID) injection 1 mg (1 mg Intravenous Given 2/11/23 2147)   albuterol inhalation solution 5 mg (5 mg Nebulization Given 2/11/23 2150)   ipratropium (ATROVENT) 0 02 % inhalation solution 0 5 mg (0 5 mg Nebulization Given 2/11/23 2150)   iohexol (OMNIPAQUE) 350 MG/ML injection (SINGLE-DOSE) 100 mL (100 mL Intravenous Given 2/11/23 2301)       Diagnostic Studies  Results Reviewed     Procedure Component Value Units Date/Time    HS Troponin I 2hr [[id number]]  (Normal) Collected: 02/11/23 2159    Lab Status: Final result Specimen: Blood from Arm, Left Updated: 02/11/23 2237     hs TnI 2hr 3 ng/L      Delta 2hr hsTnI 0 ng/L     Lipase [[id number]]  (Normal) Collected: 02/11/23 1958    Lab Status: Final result Specimen: Blood Updated: 02/11/23 2159     Lipase 82 u/L     HS Troponin 0hr (reflex protocol) [[id number]]  (Normal) Collected: 02/11/23 1958    Lab Status: Final result Specimen: Blood from Arm, Left Updated: 02/11/23 2029     hs TnI 0hr 3 ng/L     Comprehensive metabolic panel [[id number]]  (Abnormal) Collected: 02/11/23 1958    Lab Status: Final result Specimen: Blood from Arm, Left Updated: 02/11/23 2025     Sodium 142 mmol/L      Potassium 3 1 mmol/L      Chloride 106 mmol/L      CO2 28 mmol/L      ANION GAP 8 mmol/L      BUN 10 mg/dL      Creatinine 0 60 mg/dL      Glucose 186 mg/dL      Calcium 8 9 mg/dL      AST 13 U/L      ALT 24 U/L      Alkaline Phosphatase 76 U/L      Total Protein 6 4 g/dL      Albumin 3 5 g/dL      Total Bilirubin 0 25 mg/dL      eGFR 94 ml/min/1 73sq m     Narrative:      National Kidney Disease Foundation guidelines for Chronic Kidney Disease (CKD):   •  Stage 1 with normal or high GFR (GFR > 90 mL/min/1 73 square meters)  •  Stage 2 Mild CKD (GFR = 60-89 mL/min/1 73 square meters)  •  Stage 3A Moderate CKD (GFR = 45-59 mL/min/1 73 square meters)  •  Stage 3B Moderate CKD (GFR = 30-44 mL/min/1 73 square meters)  •  Stage 4 Severe CKD (GFR = 15-29 mL/min/1 73 square meters)  •  Stage 5 End Stage CKD (GFR <15 mL/min/1 73 square meters)  Note: GFR calculation is accurate only with a steady state creatinine    CBC and differential [[id number]] Collected: 02/11/23 1958    Lab Status: Final result Specimen: Blood from Arm, Left Updated: 02/11/23 2002     WBC 9 99 Thousand/uL      RBC 4 61 Million/uL      Hemoglobin 13 4 g/dL      Hematocrit 40 9 %      MCV 89 fL      MCH 29 1 pg      MCHC 32 8 g/dL      RDW 13 8 %      MPV 9 8 fL      Platelets 188 Thousands/uL      nRBC 0 /100 WBCs      Neutrophils Relative 68 %      Immat GRANS % 1 %      Lymphocytes Relative 21 %      Monocytes Relative 8 %      Eosinophils Relative 1 %      Basophils Relative 1 %      Neutrophils Absolute 6 84 Thousands/µL      Immature Grans Absolute 0 07 Thousand/uL      Lymphocytes Absolute 2 14 Thousands/µL      Monocytes Absolute 0 78 Thousand/µL      Eosinophils Absolute 0 10 Thousand/µL      Basophils Absolute 0 06 Thousands/µL                  CT abdomen pelvis with contrast   Final Result by Cayla Schofield DO (02/11 4465)      No acute inflammatory process identified  Hepatic steatosis              Workstation performed: [id number]         XR chest 1 view portable   Final Result by Barbara Ivan MD (53/31 3537) No acute cardiopulmonary disease  Workstation performed: [id number]                    Procedures  Procedures         ED Course  ED Course as of 02/16/23 2043   Sat Feb 11, 2023 2158 EKG reveals normal sinus rhythm with a rate of 91 bpm   Normal axis, normal intervals  No ST segment changes  Unchanged from previous EKG  HEART Risk Score    Flowsheet Row Most Recent Value   Heart Score Risk Calculator    History 1 Filed at: 02/11/2023 2350   ECG 1 Filed at: 02/11/2023 2350   Age 2 Filed at: 02/11/2023 2350   Risk Factors 1 Filed at: 02/11/2023 2350   Troponin 0 Filed at: 02/11/2023 2350   HEART Score 5 Filed at: 02/11/2023 2350        Identification of Seniors at [address] [place] [address] Most Recent Value   (ISAR) Identification of Seniors at Risk    Before the illness or injury that brought you to the Emergency, did you need someone to help you on a regular basis? 0 Filed at: 02/11/2023 1931   In the last 24 hours, have you needed more help than usual? 0 Filed at: 02/11/2023 1931   Have you been hospitalized for one or more nights during the past 6 months? 0 Filed at: 02/11/2023 1931   In general, do you see well? 0 Filed at: 02/11/2023 1931   In general, do you have serious problems with your memory? 0 Filed at: 02/11/2023 1931   Do you take more than three different medications every day? 1 Filed at: 02/11/2023 1931   ISAR Score 1 Filed at: 02/11/2023 1931                                    Medical Decision Making  Patient presenting for evaluation of left upper quadrant abdominal pain  On arrival she appears uncomfortable secondary to pain  She is not in any acute distress  Vital signs are unremarkable  On examination she does have left upper quadrant abdominal tenderness with associated guarding  She has scattered wheezing  She felt improved after albuterol, ipratropium, 1 dose of Dilaudid  Labs with mild hypokalemia, possibly secondary to albuterol use for COPD  Potassium rich diet encouraged  Troponin normal x2  EKG sinus rhythm without ischemic changes  Minimal suspicion for ACS  CT of the abdomen and pelvis did not reveal any acute pathology  Chest x-ray without acute cardiopulmonary disease  Unclear etiology of patient's symptoms at this time, possibly secondary to peptic ulcer disease  Patient was discharged home with instructions to follow-up with gastroenterology  Strict return precautions were discussed  She is in stable condition at time of discharge  Left upper quadrant abdominal pain: acute illness or injury  Amount and/or Complexity of Data Reviewed  Labs: ordered  Radiology: ordered  Risk  Prescription drug management  Disposition  Final diagnoses:   Left upper quadrant abdominal pain     Time reflects when diagnosis was documented in both MDM as applicable and the Disposition within this note     Time User Action Codes Description Comment    2/12/2023 12:06 AM Vitaliy Andrea Add [R10 12] Left upper quadrant abdominal pain       ED Disposition     ED Disposition   Discharge    Condition   Stable    Date/Time   Sun Feb 12, 2023 12:06 AM    Comment   Cristian Sawant discharge to home/self care                 Follow-up Information     Follow up With Specialties Details Why Contact Info Additional [address] [facility] Emergency Department Emergency Medicine Go to  If symptoms worsen [address] Emergency Department, [address], [address] [place] [address], [phone]    Kristi Dent MD  Schedule an appointment as soon as possible for a visit  for follow up [address]  [address] [facility] Gastroenterology Schedule an appointment as soon as possible for a visit  for follow up [address]  [phone] EP [facility], [address], [address] [place], [place], [address]           Discharge Medication List as of 2/12/2023 12:07 AM      CONTINUE these medications which have NOT CHANGED    Details   ATROVENT HFA 17 MCG/ACT inhaler 2 puffs 4 (four) times a day, Starting Fri 2/28/2020, Historical Med      benralizumab (FASENRA) subcutaneous injection Inject 30 mg under the skin, Historical Med      DALIRESP 500 MCG tablet Starting Fri 2/28/2020, Historical Med      gabapentin (NEURONTIN) 300 mg capsule Starting Sun 1/19/2020, Historical Med      ibuprofen (MOTRIN) 800 mg tablet ibuprofen 800 mg tablet, Historical Med      insulin aspart protamine-insulin aspart (NovoLOG 70/30) 100 units/mL injection Novolog Mix 70-30 U-100 Insulin 100 unit/mL subcutaneous solution, Historical Med      levocetirizine (XYZAL) 5 MG tablet levocetirizine 5 mg tablet, Historical Med      liraglutide (VICTOZA) injection Victoza 2-Jae 0 6 mg/0 1 mL (18 mg/3 mL) subcutaneous pen injector, Historical Med      metFORMIN (GLUCOPHAGE) 850 mg tablet Take 850 mg by mouth, Starting Wed 2/12/2020, Historical Med      montelukast (SINGULAIR) 10 mg tablet montelukast 10 mg tablet, Historical Med      predniSONE 10 mg tablet Take 1 tablet by mouth 2 (two) times a day, Historical Med      PROVENTIL  (90 Base) MCG/ACT inhaler Starting Wed 7/31/2019, Historical Med      SYMBICORT 160-4 5 MCG/ACT inhaler Starting Mon 7/22/2019, Historical Med      temazepam (RESTORIL) 30 mg capsule take 1 capsule by mouth nightly if needed for sleep, Historical Med             No discharge procedures on file      PDMP Review       Value Time User    PDMP Reviewed  Yes 11/21/2019 11:49 AM Priyanka Farfan PA-C          ED Provider  Electronically Signed by           Yanick Prakash PA-C  02/16/23 2043

## 2023-02-14 RX ORDER — FLUCONAZOLE 150 MG/1
TABLET ORAL
COMMUNITY
Start: 2022-08-24

## 2023-02-14 RX ORDER — ALENDRONATE SODIUM 70 MG/1
70 TABLET ORAL WEEKLY
COMMUNITY
Start: 2022-05-02 | End: 2023-05-02

## 2023-02-14 RX ORDER — CLOTRIMAZOLE AND BETAMETHASONE DIPROPIONATE 10; .64 MG/G; MG/G
CREAM TOPICAL 2 TIMES DAILY
COMMUNITY
Start: 2022-08-24

## 2023-02-14 RX ORDER — PANTOPRAZOLE SODIUM 40 MG/1
40 TABLET, DELAYED RELEASE ORAL DAILY
COMMUNITY
Start: 2022-10-24

## 2023-02-15 ENCOUNTER — CONSULT (OUTPATIENT)
Dept: GASTROENTEROLOGY | Facility: CLINIC | Age: 68
End: 2023-02-15

## 2023-02-15 VITALS
WEIGHT: 136 LBS | HEIGHT: 58 IN | BODY MASS INDEX: 28.55 KG/M2 | DIASTOLIC BLOOD PRESSURE: 90 MMHG | HEART RATE: 78 BPM | OXYGEN SATURATION: 98 % | SYSTOLIC BLOOD PRESSURE: 142 MMHG

## 2023-02-15 DIAGNOSIS — Z86.010 HISTORY OF COLON POLYPS: ICD-10-CM

## 2023-02-15 DIAGNOSIS — K62.5 RECTAL BLEEDING: Primary | ICD-10-CM

## 2023-02-15 DIAGNOSIS — R10.12 LEFT UPPER QUADRANT ABDOMINAL PAIN: ICD-10-CM

## 2023-02-15 DIAGNOSIS — K21.9 GASTROESOPHAGEAL REFLUX DISEASE WITHOUT ESOPHAGITIS: ICD-10-CM

## 2023-02-15 DIAGNOSIS — B02.8 HERPES ZOSTER WITH OTHER COMPLICATION: ICD-10-CM

## 2023-02-15 RX ORDER — AMOXICILLIN AND CLAVULANATE POTASSIUM 875; 125 MG/1; MG/1
TABLET, FILM COATED ORAL
COMMUNITY
Start: 2023-02-08

## 2023-02-15 RX ORDER — ERGOCALCIFEROL 1.25 MG/1
50000 CAPSULE ORAL WEEKLY
COMMUNITY
Start: 2023-01-23

## 2023-02-15 RX ORDER — DEXTROMETHORPHAN HYDROBROMIDE AND PROMETHAZINE HYDROCHLORIDE 15; 6.25 MG/5ML; MG/5ML
SYRUP ORAL
COMMUNITY
Start: 2022-12-07

## 2023-02-15 RX ORDER — CAPSAICIN 0.75 MG/G
CREAM TOPICAL 3 TIMES DAILY
Qty: 28.3 G | Refills: 1 | Status: SHIPPED | OUTPATIENT
Start: 2023-02-15

## 2023-02-15 RX ORDER — SIMVASTATIN 20 MG
20 TABLET ORAL
COMMUNITY
Start: 2022-12-08

## 2023-02-15 RX ORDER — EMPAGLIFLOZIN 25 MG/1
25 TABLET, FILM COATED ORAL DAILY
COMMUNITY
Start: 2022-11-22

## 2023-02-15 RX ORDER — COVID-19 ANTIGEN TEST
KIT MISCELLANEOUS
COMMUNITY
Start: 2022-12-06

## 2023-02-15 NOTE — PROGRESS NOTES
[facility] - Outpatient Consultation  Stephon Buchanan 79 y o  female MRN: [id number]  Encounter: [id number]          ASSESSMENT AND PLAN:      1  Rectal bleeding  - Colonoscopy; Future    2  Left upper quadrant abdominal pain  - esomeprazole (NexIUM) 20 mg capsule; Take 1 capsule (20 mg total) by mouth 2 (two) times a day before meals  Dispense: 62 capsule; Refill: 6  - EGD; Future    3  Gastroesophageal reflux disease without esophagitis  - esomeprazole (NexIUM) 20 mg capsule; Take 1 capsule (20 mg total) by mouth 2 (two) times a day before meals  Dispense: 62 capsule; Refill: 6  - EGD; Future    4  History of colon polyps  - Colonoscopy; Future    5  Herpes zoster with other complication  - capsicum (ZOSTRIX) 0 075 % topical cream; Apply topically 3 (three) times a day  Dispense: 28 3 g; Refill: 1    ______________________________________________________________________    HPI: Olga Lidia Becerra is a 61-year-old female who comes the office today for evaluation of a left upper quadrant abdominal pain  She states this pain started about 2 weeks ago  She had gone to the emergency department on February 11, 2023 with a complaint of this left-sided abdominal pain associated with gaseousness but without any complaint of nausea, vomiting, bloating, hematemesis  CT scan of the abdomen and pelvis was performed on her at that time on February 11, 2023 and it showed no acute inflammatory process identified but it did show hepatic steatosis  Her troponin, lipase, CBC were within normal limits  Her serum chemistry panel was remarkable only for potassium level of 3 1 and a blood sugar of 186  She does have a prior history of shingles occurred about 4 years ago and was treated with a topical lotion  She also had a history of undergoing colonoscopy 2 years ago however the bowel prep was so poor that it was recommended that the examination be repeated 6 months later    This was performed by one of the twin [facility]  She states that she did want to go to [facility] due to the long distance  She states that her heartburn symptoms have been under control and she is experiencing heartburn at least 4-5 times per week despite taking pantoprazole over the past year  She is also been taking Pepcid as well and both of these medications combined has not controlled her heartburn symptoms she denies any dysphagia  REVIEW OF SYSTEMS    CONSTITUTIONAL: Denies any fever, chills, rigors, and weight loss  HEENT: No earache or tinnitus  Denies hearing loss or visual disturbances  CARDIOVASCULAR: No chest pain or palpitations  RESPIRATORY: Denies any cough, hemoptysis, shortness of breath or dyspnea on exertion  GASTROINTESTINAL: As noted in the History of Present Illness  GENITOURINARY: No problems with urination  Denies any hematuria or dysuria  NEUROLOGIC: No dizziness or vertigo, denies headaches  MUSCULOSKELETAL: Denies any muscle or joint pain  SKIN: Denies skin rashes or itching  ENDOCRINE: Denies excessive thirst  Denies intolerance to heat or cold  PSYCHOSOCIAL: Denies depression or anxiety  Denies any recent memory loss         Historical Information   Past Medical History:   Diagnosis Date   • Asthma    • COPD (chronic obstructive pulmonary disease) ([facility] [address] )    • Diabetes mellitus ([facility] [address] )      Past Surgical History:   Procedure Laterality Date   • HERNIA REPAIR     • [place] EXCISION GANGLION WRIST DORSAL/VOLAR PRIMARY Left 1/10/2020    Procedure: DORSAL WRIST GANGLION CYST EXCISION; DISTAL RADIUS CYST EXCISION;  Surgeon: Verónica Del Cid MD;  [facility];  Service: Orthopedics   • [place] INCISION EXTENSOR TENDON SHEATH WRIST Left 1/10/2020    Procedure: Jaqui Chaves;  Surgeon: Verónica Del Cid MD;  [facility];  Service: Orthopedics     Social History   Social History     Substance and Sexual Activity   Alcohol Use Never     Social History     Substance and Sexual Activity   Drug Use Never Social History     Tobacco Use   Smoking Status Former   • Types: Cigarettes   • Quit date:    • Years since quittin 1   Smokeless Tobacco Never     History reviewed  No pertinent family history  Meds/Allergies       Current Outpatient Medications:   •  alendronate (FOSAMAX) 70 mg tablet  •  amoxicillin-clavulanate (AUGMENTIN) 875-125 mg per tablet  •  ATROVENT HFA 17 MCG/ACT inhaler  •  benralizumab (FASENRA) subcutaneous injection  •  capsicum (ZOSTRIX) 0 075 % topical cream  •  clotrimazole-betamethasone (LOTRISONE) 1-0 05 % cream  •  ergocalciferol (VITAMIN D2) 50,000 units  •  esomeprazole (NexIUM) 20 mg capsule  •  Flowflex COVID-19 Ag Home Test KIT  •  fluconazole (DIFLUCAN) 150 mg tablet  •  gabapentin (NEURONTIN) 300 mg capsule  •  ibuprofen (MOTRIN) 800 mg tablet  •  insulin aspart protamine-insulin aspart (NovoLOG 70/30) 100 units/mL injection  •  Jardiance 25 MG TABS  •  levocetirizine (XYZAL) 5 MG tablet  •  liraglutide (VICTOZA) injection  •  metFORMIN (GLUCOPHAGE) 850 mg tablet  •  montelukast (SINGULAIR) 10 mg tablet  •  pantoprazole (PROTONIX) 40 mg tablet  •  predniSONE 10 mg tablet  •  promethazine-dextromethorphan (PHENERGAN-DM) 6 25-15 mg/5 mL oral syrup  •  PROVENTIL  (90 Base) MCG/ACT inhaler  •  simvastatin (ZOCOR) 20 mg tablet  •  SYMBICORT 160-4 5 MCG/ACT inhaler  •  temazepam (RESTORIL) 30 mg capsule  •  DALIRESP 500 MCG tablet    Allergies   Allergen Reactions   • Cephalexin Hives   • Clindamycin Hives   • Metformin Other (See Comments)   • Sulfamethoxazole-Trimethoprim Hives           Objective     Blood pressure 142/90, pulse 78, height 4' 10" (1 473 m), weight 61 7 kg (136 lb), SpO2 98 %  Body mass index is 28 42 kg/m²          PHYSICAL EXAM:      General Appearance:   Alert, cooperative, no distress   HEENT:   Normocephalic, atraumatic, anicteric      Neck:  Supple, symmetrical, trachea midline   Lungs:   Clear to auscultation bilaterally; no rales, rhonchi or wheezing; respirations unlabored    Heart[de-identified]   Regular rate and rhythm; no murmur, rub, or gallop  Abdomen:   Soft, non-tender, non-distended; normal bowel sounds; no masses, no organomegaly   Genitalia:   Deferred    Rectal:   Deferred    Extremities:  No cyanosis, clubbing or edema    Pulses:  2+ and symmetric    Skin:   No evidence of jaundice, skin rash, but she does have a dermatomal pattern of erythematous lesions consistent with herpes zoster just below the rib in the left abdomen with one lesion also present to the back   Lymph nodes:  No palpable cervical lymphadenopathy        Lab Results:   No visits with results within 1 Day(s) from this visit     Latest known visit with results is:   Admission on 02/11/2023, Discharged on 02/12/2023   Component Date Value   • Ventricular Rate 02/11/2023 91    • Atrial Rate 02/11/2023 91    • WV Interval 02/11/2023 166    • QRSD Interval 02/11/2023 78    • QT Interval 02/11/2023 334    • QTC Interval 02/11/2023 410    • P Axis 02/11/2023 83    • QRS Axis 02/11/2023 77    • T Wave Axis 02/11/2023 53    • WBC 02/11/2023 9 99    • RBC 02/11/2023 4 61    • Hemoglobin 02/11/2023 13 4    • Hematocrit 02/11/2023 40 9    • MCV 02/11/2023 89    • MCH 02/11/2023 29 1    • MCHC 02/11/2023 32 8    • RDW 02/11/2023 13 8    • MPV 02/11/2023 9 8    • Platelets 48/47/5128 263    • nRBC 02/11/2023 0    • Neutrophils Relative 02/11/2023 68    • Immat GRANS % 02/11/2023 1    • Lymphocytes Relative 02/11/2023 21    • Monocytes Relative 02/11/2023 8    • Eosinophils Relative 02/11/2023 1    • Basophils Relative 02/11/2023 1    • Neutrophils Absolute 02/11/2023 6 84    • Immature Grans Absolute 02/11/2023 0 07    • Lymphocytes Absolute 02/11/2023 2 14    • Monocytes Absolute 02/11/2023 0 78    • Eosinophils Absolute 02/11/2023 0 10    • Basophils Absolute 02/11/2023 0 06    • Sodium 02/11/2023 142    • Potassium 02/11/2023 3 1 (L)    • Chloride 02/11/2023 106    • CO2 02/11/2023 28    • ANION GAP 02/11/2023 8    • BUN 02/11/2023 10    • Creatinine 02/11/2023 0 60    • Glucose 02/11/2023 186 (H)    • Calcium 02/11/2023 8 9    • AST 02/11/2023 13    • ALT 02/11/2023 24    • Alkaline Phosphatase 02/11/2023 76    • Total Protein 02/11/2023 6 4    • Albumin 02/11/2023 3 5    • Total Bilirubin 02/11/2023 0 25    • eGFR 02/11/2023 94    • hs TnI 0hr 02/11/2023 3    • hs TnI 2hr 02/11/2023 3    • Delta 2hr hsTnI 02/11/2023 0    • Lipase 02/11/2023 82          Radiology Results:   XR chest 1 view portable    Result Date: 2/12/2023  Narrative: CHEST INDICATION:   chest pain  COMPARISON:  CXR and CT 9/13/2022, abdomen CT 2/11/2023  EXAM PERFORMED/VIEWS:  XR CHEST PORTABLE FINDINGS: Cardiomediastinal silhouette appears unremarkable  The lungs are clear  No pneumothorax or pleural effusion  Osseous structures appear within normal limits for patient age  Impression: No acute cardiopulmonary disease  Workstation performed: [id number]     CT abdomen pelvis with contrast    Result Date: 2/11/2023  Narrative: CT ABDOMEN AND PELVIS WITH IV CONTRAST INDICATION:   LUQ abdominal pain LUQ pain  COMPARISON:  None  TECHNIQUE:  CT examination of the abdomen and pelvis was performed  Axial, sagittal, and coronal 2D reformatted images were created from the source data and submitted for interpretation  Radiation dose length product (DLP) for this visit:  613 mGy-cm   This examination, like all CT scans performed in the [facility], was performed utilizing techniques to minimize radiation dose exposure, including the use of iterative reconstruction and automated exposure control  IV Contrast:  100 mL of iohexol (OMNIPAQUE) Enteric Contrast:  Enteric contrast was not administered  FINDINGS: ABDOMEN LOWER CHEST:  No clinically significant abnormality identified in the visualized lower chest  LIVER/BILIARY TREE:  Liver is diffusely decreased in density consistent with fatty change    No CT evidence of suspicious hepatic mass  Normal hepatic contours  No biliary dilatation  GALLBLADDER:  No calcified gallstones  No pericholecystic inflammatory change  SPLEEN:  Unremarkable  PANCREAS:  Unremarkable  ADRENAL GLANDS:  Unremarkable  KIDNEYS/URETERS:  No hydronephrosis or urinary tract calculus  One or more sharply circumscribed subcentimeter renal hypodensities are present, too small to accurately characterize, and statistically most likely benign findings  According to recent literature (Radiology 2019) no further workup of these findings is recommended  STOMACH AND BOWEL:  There is colonic diverticulosis without evidence of acute diverticulitis  APPENDIX:  A normal appendix was visualized  ABDOMINOPELVIC CAVITY:  No ascites  No pneumoperitoneum  No lymphadenopathy  VESSELS:  Atherosclerotic changes are present  No evidence of aneurysm  PELVIS REPRODUCTIVE ORGANS:  Unremarkable for patient's age  URINARY BLADDER:  Unremarkable  ABDOMINAL WALL/INGUINAL REGIONS:  Unremarkable  OSSEOUS STRUCTURES:  No acute fracture or destructive osseous lesion  Spinal degenerative changes are noted  Impression: No acute inflammatory process identified  Hepatic steatosis   Workstation performed: [id number]

## 2023-02-15 NOTE — PATIENT INSTRUCTIONS
Scheduled date of EGD/colonoscopy (as of today):5/9/23  Physician performing EGD/colonoscopy:Ar  Location of EGD/colonoscopy:[place]  Desired bowel prep reviewed with patient:Doroteo/Miralax  Instructions reviewed with patient by:Mamaodu bautista  Clearances:   none

## 2023-03-22 ENCOUNTER — OFFICE VISIT (OUTPATIENT)
Dept: URGENT CARE | Facility: CLINIC | Age: 68
End: 2023-03-22

## 2023-03-22 VITALS
RESPIRATION RATE: 19 BRPM | OXYGEN SATURATION: 96 % | SYSTOLIC BLOOD PRESSURE: 171 MMHG | TEMPERATURE: 98.1 F | DIASTOLIC BLOOD PRESSURE: 93 MMHG | HEART RATE: 90 BPM

## 2023-03-22 DIAGNOSIS — H66.92 LEFT OTITIS MEDIA, UNSPECIFIED OTITIS MEDIA TYPE: Primary | ICD-10-CM

## 2023-03-22 RX ORDER — AZITHROMYCIN 500 MG/1
500 TABLET, FILM COATED ORAL DAILY
Qty: 7 TABLET | Refills: 0 | Status: SHIPPED | OUTPATIENT
Start: 2023-03-22 | End: 2023-03-29

## 2023-03-22 NOTE — PROGRESS NOTES
[address] [facility] [facility]        NAME: Shelly Gregory is a 79 y o  female  : 1955    MRN: [id number]  DATE: 2023  TIME: 11:43 AM    Assessment and Plan   Left otitis media, unspecified otitis media type [H66 92]  1  Left otitis media, unspecified otitis media type  azithromycin (ZITHROMAX) 500 MG tablet          Recommended patient follow up with her PCP regarding her BP     Patient Instructions       Follow up with PCP in 3-5 days  Proceed to  ER if symptoms worsen  Chief Complaint     Chief Complaint   Patient presents with   • Sore Throat     Pt c/o sore throat and left sided earache for 4 days  History of Present Illness       Patient is a 78 yo female who presents for evaluation of sore throat and left-sided ear pain  Denies fevers, chills, swollen glands in neck, dysphagia, odynophagia, trismus, voice changes, rash, abdominal pain, n/v/d, chest pain, and SOB  No dizziness or headaches        Review of Systems   Review of Systems   Constitutional: Negative for chills and fever  HENT: Positive for ear pain and sore throat  Negative for ear discharge, facial swelling, hearing loss, trouble swallowing and voice change  Respiratory: Negative for cough and shortness of breath  Neurological: Negative for dizziness and headaches           Current Medications       Current Outpatient Medications:   •  alendronate (FOSAMAX) 70 mg tablet, Take 70 mg by mouth Once a week, Disp: , Rfl:   •  ATROVENT HFA 17 MCG/ACT inhaler, 2 puffs 4 (four) times a day, Disp: , Rfl:   •  azithromycin (ZITHROMAX) 500 MG tablet, Take 1 tablet (500 mg total) by mouth daily for 7 days, Disp: 7 tablet, Rfl: 0  •  ergocalciferol (VITAMIN D2) 50,000 units, Take 50,000 Units by mouth once a week, Disp: , Rfl:   •  esomeprazole (NexIUM) 20 mg capsule, Take 1 capsule (20 mg total) by mouth 2 (two) times a day before meals, Disp: 62 capsule, Rfl: 6  •  gabapentin (NEURONTIN) 300 mg capsule, , Disp: , Rfl:   • levocetirizine (XYZAL) 5 MG tablet, levocetirizine 5 mg tablet, Disp: , Rfl:   •  liraglutide (VICTOZA) injection, Victoza 2-Jae 0 6 mg/0 1 mL (18 mg/3 mL) subcutaneous pen injector, Disp: , Rfl:   •  montelukast (SINGULAIR) 10 mg tablet, montelukast 10 mg tablet, Disp: , Rfl:   •  pantoprazole (PROTONIX) 40 mg tablet, Take 40 mg by mouth daily, Disp: , Rfl:   •  predniSONE 10 mg tablet, Take 1 tablet by mouth 2 (two) times a day, Disp: , Rfl:   •  simvastatin (ZOCOR) 20 mg tablet, Take 20 mg by mouth daily with dinner, Disp: , Rfl:   •  SYMBICORT 160-4 5 MCG/ACT inhaler, , Disp: , Rfl: 0  •  temazepam (RESTORIL) 30 mg capsule, take 1 capsule by mouth nightly if needed for sleep, Disp: , Rfl:   •  amoxicillin-clavulanate (AUGMENTIN) 875-125 mg per tablet, take 1 tablet by mouth every 12 hours for 10 days (Patient not taking: Reported on 3/22/2023), Disp: , Rfl:   •  benralizumab (FASENRA) subcutaneous injection, Inject 30 mg under the skin (Patient not taking: Reported on 3/22/2023), Disp: , Rfl:   •  capsicum (ZOSTRIX) 0 075 % topical cream, Apply topically 3 (three) times a day (Patient not taking: Reported on 3/22/2023), Disp: 28 3 g, Rfl: 1  •  clotrimazole-betamethasone (LOTRISONE) 1-0 05 % cream, Apply topically 2 (two) times a day (Patient not taking: Reported on 3/22/2023), Disp: , Rfl:   •  DALIRESP 500 MCG tablet, , Disp: , Rfl:   •  Flowflex COVID-19 Ag Home Test KIT, , Disp: , Rfl:   •  fluconazole (DIFLUCAN) 150 mg tablet, Take one by mouth today and repeat in 3 days (Patient not taking: Reported on 3/22/2023), Disp: , Rfl:   •  ibuprofen (MOTRIN) 800 mg tablet, ibuprofen 800 mg tablet (Patient not taking: Reported on 3/22/2023), Disp: , Rfl:   •  insulin aspart protamine-insulin aspart (NovoLOG 70/30) 100 units/mL injection, Novolog Mix 70-30 U-100 Insulin 100 unit/mL subcutaneous solution (Patient not taking: Reported on 3/22/2023), Disp: , Rfl:   •  Jardiance 25 MG TABS, Take 25 mg by mouth daily (Patient not taking: Reported on 3/22/2023), Disp: , Rfl:   •  metFORMIN (GLUCOPHAGE) 850 mg tablet, Take 850 mg by mouth (Patient not taking: Reported on 3/22/2023), Disp: , Rfl:   •  promethazine-dextromethorphan (PHENERGAN-DM) 6 25-15 mg/5 mL oral syrup, take 5 milliliters by mouth four times a day if needed for cough (Patient not taking: Reported on 3/22/2023), Disp: , Rfl:   •  PROVENTIL  (90 Base) MCG/ACT inhaler, , Disp: , Rfl: 0    Current Allergies     Allergies as of 03/22/2023 - Reviewed 03/22/2023   Allergen Reaction Noted   • Cephalexin Hives 03/13/2014   • Clindamycin Hives 09/14/2017   • Metformin Other (See Comments) 03/16/2020   • Sulfamethoxazole-trimethoprim Hives 03/13/2014            The following portions of the patient's history were reviewed and updated as appropriate: allergies, current medications, past family history, past medical history, past social history, past surgical history and problem list      Past Medical History:   Diagnosis Date   • Asthma    • COPD (chronic obstructive pulmonary disease) ([facility] [address] )    • Diabetes mellitus ([facility] [address] )        Past Surgical History:   Procedure Laterality Date   • HERNIA REPAIR     • [place] EXCISION GANGLION WRIST DORSAL/VOLAR PRIMARY Left 1/10/2020    Procedure: DORSAL WRIST GANGLION CYST EXCISION; DISTAL RADIUS CYST EXCISION;  Surgeon: Noy Ayon MD;  [facility];  Service: Orthopedics   • [place] INCISION EXTENSOR TENDON SHEATH WRIST Left 1/10/2020    Procedure: Pattricia Myton;  Surgeon: Noy Ayon MD;  [facility];  Service: Orthopedics       History reviewed  No pertinent family history  Medications have been verified  Objective   BP (!) 171/93   Pulse 90   Temp 98 1 °F (36 7 °C)   Resp 19   SpO2 96%        Physical Exam     Physical Exam  Constitutional:       General: She is not in acute distress    HENT:      Right Ear: Tympanic membrane, ear canal and external ear normal       Ears:      Comments: Left TM erythematous and bulging   Cardiovascular:      Rate and Rhythm: Normal rate  Skin:     General: Skin is warm and dry  Neurological:      Mental Status: She is alert     Psychiatric:         Mood and Affect: Mood normal          Behavior: Behavior normal

## 2023-05-09 ENCOUNTER — ANESTHESIA (OUTPATIENT)
Dept: GASTROENTEROLOGY | Facility: HOSPITAL | Age: 68
End: 2023-05-09

## 2023-05-09 ENCOUNTER — ANESTHESIA EVENT (OUTPATIENT)
Dept: GASTROENTEROLOGY | Facility: HOSPITAL | Age: 68
End: 2023-05-09

## 2023-05-09 ENCOUNTER — HOSPITAL ENCOUNTER (OUTPATIENT)
Dept: GASTROENTEROLOGY | Facility: HOSPITAL | Age: 68
Setting detail: OUTPATIENT SURGERY
Discharge: HOME | End: 2023-05-09
Attending: INTERNAL MEDICINE | Admitting: INTERNAL MEDICINE

## 2023-05-09 VITALS
WEIGHT: 133.4 LBS | RESPIRATION RATE: 16 BRPM | SYSTOLIC BLOOD PRESSURE: 141 MMHG | BODY MASS INDEX: 26.19 KG/M2 | TEMPERATURE: 97.2 F | HEIGHT: 60 IN | DIASTOLIC BLOOD PRESSURE: 75 MMHG | HEART RATE: 79 BPM | OXYGEN SATURATION: 98 %

## 2023-05-09 DIAGNOSIS — K21.9 GASTROESOPHAGEAL REFLUX DISEASE WITHOUT ESOPHAGITIS: ICD-10-CM

## 2023-05-09 DIAGNOSIS — Z86.010 HISTORY OF COLON POLYPS: ICD-10-CM

## 2023-05-09 DIAGNOSIS — K62.5 RECTAL BLEEDING: ICD-10-CM

## 2023-05-09 DIAGNOSIS — R10.12 LEFT UPPER QUADRANT ABDOMINAL PAIN: ICD-10-CM

## 2023-05-09 LAB — GLUCOSE SERPL-MCNC: 144 MG/DL (ref 65–140)

## 2023-05-09 RX ORDER — SODIUM CHLORIDE, SODIUM LACTATE, POTASSIUM CHLORIDE, CALCIUM CHLORIDE 600; 310; 30; 20 MG/100ML; MG/100ML; MG/100ML; MG/100ML
INJECTION, SOLUTION INTRAVENOUS CONTINUOUS PRN
Status: DISCONTINUED | OUTPATIENT
Start: 2023-05-09 | End: 2023-05-09

## 2023-05-09 RX ORDER — LIDOCAINE HYDROCHLORIDE 20 MG/ML
INJECTION, SOLUTION EPIDURAL; INFILTRATION; INTRACAUDAL; PERINEURAL AS NEEDED
Status: DISCONTINUED | OUTPATIENT
Start: 2023-05-09 | End: 2023-05-09

## 2023-05-09 RX ORDER — PROPOFOL 10 MG/ML
INJECTION, EMULSION INTRAVENOUS AS NEEDED
Status: DISCONTINUED | OUTPATIENT
Start: 2023-05-09 | End: 2023-05-09

## 2023-05-09 RX ADMIN — PROPOFOL 50 MG: 10 INJECTION, EMULSION INTRAVENOUS at 07:27

## 2023-05-09 RX ADMIN — PROPOFOL 50 MG: 10 INJECTION, EMULSION INTRAVENOUS at 07:25

## 2023-05-09 RX ADMIN — PROPOFOL 50 MG: 10 INJECTION, EMULSION INTRAVENOUS at 07:22

## 2023-05-09 RX ADMIN — LIDOCAINE HYDROCHLORIDE 100 MG: 20 INJECTION, SOLUTION EPIDURAL; INFILTRATION; INTRACAUDAL at 07:18

## 2023-05-09 RX ADMIN — PROPOFOL 20 MG: 10 INJECTION, EMULSION INTRAVENOUS at 07:40

## 2023-05-09 RX ADMIN — PROPOFOL 20 MG: 10 INJECTION, EMULSION INTRAVENOUS at 07:36

## 2023-05-09 RX ADMIN — PROPOFOL 100 MG: 10 INJECTION, EMULSION INTRAVENOUS at 07:18

## 2023-05-09 RX ADMIN — SODIUM CHLORIDE, SODIUM LACTATE, POTASSIUM CHLORIDE, AND CALCIUM CHLORIDE: .6; .31; .03; .02 INJECTION, SOLUTION INTRAVENOUS at 07:14

## 2023-05-09 RX ADMIN — PROPOFOL 20 MG: 10 INJECTION, EMULSION INTRAVENOUS at 07:33

## 2023-05-09 RX ADMIN — PROPOFOL 50 MG: 10 INJECTION, EMULSION INTRAVENOUS at 07:21

## 2023-05-09 RX ADMIN — PROPOFOL 10 MG: 10 INJECTION, EMULSION INTRAVENOUS at 07:41

## 2023-05-09 RX ADMIN — PROPOFOL 30 MG: 10 INJECTION, EMULSION INTRAVENOUS at 07:30

## 2023-05-09 NOTE — H&P
History and Physical -  [facility]  Valentín Miller 79 y o  female MRN: [id number]      HPI: Valentín Miller is a 79y o  year old female who presents for evaluation of abdominal pain, Gastrosoft reflux disease, rectal bleeding, history of colon polyps      REVIEW OF SYSTEMS: Per the HPI, and otherwise unremarkable  Historical Information   Past Medical History:   Diagnosis Date   • Asthma    • Colon polyp    • COPD (chronic obstructive pulmonary disease) ([facility] )    • Diabetes mellitus ([facility] )    • Hyperlipidemia      Past Surgical History:   Procedure Laterality Date   • COLONOSCOPY     • HERNIA REPAIR     • [place] EXCISION GANGLION WRIST DORSAL/VOLAR PRIMARY Left 01/10/2020    Procedure: DORSAL WRIST GANGLION CYST EXCISION; DISTAL RADIUS CYST EXCISION;  Surgeon: Sunny Field MD;  [facility];  Service: Orthopedics   • [place] INCISION EXTENSOR TENDON SHEATH WRIST Left 01/10/2020    Procedure: Julianne Saldana;  Surgeon: Sunny Field MD;  [facility];  Service: Orthopedics     Social History   Social History     Substance and Sexual Activity   Alcohol Use Never     Social History     Substance and Sexual Activity   Drug Use Never     Social History     Tobacco Use   Smoking Status Some Days   • Types: Cigarettes   • Last attempt to quit:    • Years since quittin 3   Smokeless Tobacco Never   Tobacco Comments    One pack per one month and a half     History reviewed  No pertinent family history  Meds/Allergies     (Not in a hospital admission)      Allergies   Allergen Reactions   • Cephalexin Hives   • Clindamycin Hives   • Metformin Other (See Comments)   • Sulfamethoxazole-Trimethoprim Hives       Objective     Blood pressure 160/85, pulse 86, temperature 97 5 °F (36 4 °C), temperature source Temporal, resp  rate 16, height 5' (1 524 m), weight 60 5 kg (133 lb 6 4 oz), SpO2 96 %        PHYSICAL EXAM    Gen: NAD  CV: RRR  CHEST: Clear  ABD: soft, NT/ND  EXT: no edema      ASSESSMENT/PLAN:  This is a 79y o  year old female here for EGD, colonoscopy, and she is stable and optimized for her procedure

## 2023-05-09 NOTE — ANESTHESIA POSTPROCEDURE EVALUATION
Post-Op Assessment Note    CV Status:  Stable  Pain Score: 0    Pain management: adequate     Mental Status:  Awake and sleepy   Hydration Status:  Euvolemic   PONV Controlled:  Controlled   Airway Patency:  Patent and adequate      Post Op Vitals Reviewed: Yes      Staff: CRNA         No notable events documented      BP   98/56   Temp      Pulse  76   Resp   20   SpO2   99

## 2023-05-09 NOTE — ANESTHESIA PREPROCEDURE EVALUATION
Review of Systems/Medical History  Patient summary reviewed  Chart reviewed  Cardiovascular  Negative cardio ROS Exercise tolerance (METS): >4 METS  ,     Pulmonary  COPD , Asthma ,   Comment: Preop evaluation for hand surgery: Chest CT scan from December 2019 and pulmonary function test from December 2019 were both reviewed  The patient is cleared from pulmonary standpoint to undergo general anesthesia for surgery  Acute bronchitis with COPD exacerbation patient will be started on Augmentin 875 mg p o  every 12 hours for 10 days advised her to continue with the albuterol 4 times daily and PRN  Cough productive of yellow-green sputum patient will start on Phenergan with codeine for cough suppression  GI/Hepatic  Negative GI/hepatic ROS          Negative  ROS        Endo/Other  Diabetes ,      GYN  Negative gynecology ROS          Hematology  Negative hematology ROS      Musculoskeletal  Negative musculoskeletal ROS        Neurology  Negative neurology ROS      Psychology   Negative psychology ROS              Physical Exam    Airway    Mallampati score: III  TM Distance: >3 FB  Neck ROM: full     Dental       Cardiovascular  Comment: Negative ROS, Cardiovascular exam normal    Pulmonary  Pulmonary exam normal     Other Findings        Anesthesia Plan  ASA Score- 2     Anesthesia Type- IV sedation with anesthesia with ASA Monitors  Additional Monitors:   Airway Plan:     Comment: I have seen the patient and reviewed the history  Patient to receive IV sedation with full ASA monitors  Risks discussed with the patient, consent signed          Plan Factors-Exercise tolerance (METS): >4 METS  Chart reviewed  Patient summary reviewed  Induction- intravenous  Postoperative Plan-     Informed Consent- Anesthetic plan and risks discussed with patient  I personally reviewed this patient with the CRNA  Discussed and agreed on the Anesthesia Plan with the CRNA  Shayne Small

## 2023-05-12 ENCOUNTER — TELEPHONE (OUTPATIENT)
Dept: GASTROENTEROLOGY | Facility: CLINIC | Age: 68
End: 2023-05-12

## 2023-05-12 NOTE — TELEPHONE ENCOUNTER
----- Message from Pedro Chester DO sent at 5/12/2023  7:10 AM EDT -----  Please call the patient with the polyp and biopsy results  Biopsies of stomach were benign and negative for Helicobacter pylori or for cancer  Biopsies of the colon showed hyperplastic polyp only and no precancerous polyp    This patient due for repeat colonoscopy in 10 years because these are both benign polyp

## 2023-07-14 ENCOUNTER — HOSPITAL ENCOUNTER (EMERGENCY)
Facility: HOSPITAL | Age: 68
Discharge: HOME | End: 2023-07-14
Attending: EMERGENCY MEDICINE
Payer: MEDICARE

## 2023-07-14 ENCOUNTER — APPOINTMENT (EMERGENCY)
Dept: RADIOLOGY | Facility: HOSPITAL | Age: 68
End: 2023-07-14
Payer: MEDICARE

## 2023-07-14 VITALS
RESPIRATION RATE: 18 BRPM | BODY MASS INDEX: 27.09 KG/M2 | TEMPERATURE: 97.4 F | SYSTOLIC BLOOD PRESSURE: 167 MMHG | OXYGEN SATURATION: 96 % | HEART RATE: 95 BPM | DIASTOLIC BLOOD PRESSURE: 74 MMHG | HEIGHT: 60 IN | WEIGHT: 138 LBS

## 2023-07-14 DIAGNOSIS — M25.562 LEFT KNEE PAIN: Primary | ICD-10-CM

## 2023-07-14 PROCEDURE — 73564 X-RAY EXAM KNEE 4 OR MORE: CPT

## 2023-07-14 PROCEDURE — 99284 EMERGENCY DEPT VISIT MOD MDM: CPT | Performed by: PHYSICIAN ASSISTANT

## 2023-07-14 PROCEDURE — 99284 EMERGENCY DEPT VISIT MOD MDM: CPT

## 2023-07-14 NOTE — DISCHARGE INSTRUCTIONS
Return to the Emergency Department sooner if increased pain, swelling, numbness, weakness, vomiting, difficulty breathing, redness. Ice, elevate.

## 2023-07-14 NOTE — ED PROVIDER NOTES
History  Chief Complaint   Patient presents with   • Knee Pain     C/o left knee pain, states she was installing tiles without a knee support. This is a 25-year-old female patient presented to the emergency room for left knee pain. She states Tuesday she was painting and kneeling down on her knees and the next morning she woke up and had swelling and pain of the left knee. She has had persistent pain and swelling since then. No rash redness warmth and no fevers or chills. She is able to bear weight. She is able to flex and extend but has pain in doing so. No lacerations abrasions or puncture wounds to the joint. No recent illnesses. History provided by:  Patient   used: No        Prior to Admission Medications   Prescriptions Last Dose Informant Patient Reported? Taking?    ATROVENT HFA 17 MCG/ACT inhaler  Self Yes No   Si puffs 4 (four) times a day   Flowflex COVID-19 Ag Home Test KIT  Self Yes No   PROVENTIL  (90 Base) MCG/ACT inhaler  Self Yes No   SYMBICORT 160-4.5 MCG/ACT inhaler  Self Yes No   alendronate (FOSAMAX) 70 mg tablet  Self Yes No   Sig: Take 70 mg by mouth Once a week   ergocalciferol (VITAMIN D2) 50,000 units  Self Yes No   Sig: Take 50,000 Units by mouth once a week   esomeprazole (NexIUM) 20 mg capsule   No No   Sig: Take 1 capsule (20 mg total) by mouth 2 (two) times a day before meals   fluconazole (DIFLUCAN) 150 mg tablet  Self Yes No   Sig: Take one by mouth today and repeat in 3 days   Patient not taking: Reported on 3/22/2023   levocetirizine (XYZAL) 5 MG tablet  Self Yes No   Sig: levocetirizine 5 mg tablet   liraglutide (VICTOZA) injection  Self Yes No   Sig: Victoza 2-Jae 0.6 mg/0.1 mL (18 mg/3 mL) subcutaneous pen injector   montelukast (SINGULAIR) 10 mg tablet  Self Yes No   Sig: montelukast 10 mg tablet   pantoprazole (PROTONIX) 40 mg tablet  Self Yes No   Sig: Take 40 mg by mouth daily   predniSONE 10 mg tablet  Self Yes No   Sig: Take 1 tablet by mouth 2 (two) times a day   promethazine-dextromethorphan (PHENERGAN-DM) 6.25-15 mg/5 mL oral syrup  Self Yes No   Sig: take 5 milliliters by mouth four times a day if needed for cough   Patient not taking: Reported on 3/22/2023   simvastatin (ZOCOR) 20 mg tablet  Self Yes No   Sig: Take 20 mg by mouth daily with dinner   temazepam (RESTORIL) 30 mg capsule  Self Yes No   Sig: take 1 capsule by mouth nightly if needed for sleep      Facility-Administered Medications: None       Past Medical History:   Diagnosis Date   • Asthma    • Colon polyp    • COPD (chronic obstructive pulmonary disease) ([address])    • Diabetes mellitus ([address])    • Hyperlipidemia        Past Surgical History:   Procedure Laterality Date   • COLONOSCOPY     • HERNIA REPAIR     • AZ EXCISION GANGLION WRIST DORSAL/VOLAR PRIMARY Left 01/10/2020    Procedure: DORSAL WRIST GANGLION CYST EXCISION; DISTAL RADIUS CYST EXCISION;  Surgeon: Mk Erickson MD;  Location: [facility];  Service: Orthopedics   • AZ INCISION EXTENSOR TENDON SHEATH WRIST Left 01/10/2020    Procedure: Dutch Giuliana;  Surgeon: Mk Erickson MD;  Location: [facility];  Service: Orthopedics       History reviewed. No pertinent family history. I have reviewed and agree with the history as documented. E-Cigarette/Vaping   • E-Cigarette Use Never User      E-Cigarette/Vaping Substances   • Nicotine No    • THC No    • CBD No    • Flavoring No    • Other No    • Unknown No      Social History     Tobacco Use   • Smoking status: Some Days     Types: Cigarettes     Last attempt to quit: 2015     Years since quittin.5   • Smokeless tobacco: Never   • Tobacco comments:     One pack per one month and a half   Vaping Use   • Vaping Use: Never used   Substance Use Topics   • Alcohol use: Never   • Drug use: Never       Review of Systems   Constitutional: Negative for chills and fever. HENT: Negative for ear pain and sore throat.     Eyes: Negative for pain and visual disturbance. Respiratory: Negative for cough and shortness of breath. Cardiovascular: Negative for chest pain and palpitations. Gastrointestinal: Negative for abdominal pain and vomiting. Genitourinary: Negative for dysuria and hematuria. Musculoskeletal: Positive for arthralgias. Negative for back pain. Skin: Negative for color change and rash. Neurological: Negative for seizures and syncope. All other systems reviewed and are negative. Physical Exam  Physical Exam  Vitals and nursing note reviewed. Constitutional:       General: She is not in acute distress. Appearance: She is well-developed. HENT:      Head: Normocephalic and atraumatic. Eyes:      Conjunctiva/sclera: Conjunctivae normal.   Cardiovascular:      Rate and Rhythm: Normal rate and regular rhythm. Heart sounds: No murmur heard. Pulmonary:      Effort: Pulmonary effort is normal. No respiratory distress. Breath sounds: Normal breath sounds. Abdominal:      Palpations: Abdomen is soft. Tenderness: There is no abdominal tenderness. Musculoskeletal:         General: No swelling. Cervical back: Neck supple. Left knee: Tenderness present over the medial joint line. Legs:    Skin:     General: Skin is warm and dry. Capillary Refill: Capillary refill takes less than 2 seconds. Neurological:      Mental Status: She is alert.    Psychiatric:         Mood and Affect: Mood normal.         Vital Signs  ED Triage Vitals [07/14/23 1650]   Temperature Pulse Respirations Blood Pressure SpO2   (!) 97.4 °F (36.3 °C) 95 18 167/74 96 %      Temp Source Heart Rate Source Patient Position - Orthostatic VS BP Location FiO2 (%)   Temporal Monitor Sitting Left arm --      Pain Score       --           Vitals:    07/14/23 1650   BP: 167/74   Pulse: 95   Patient Position - Orthostatic VS: Sitting         Visual Acuity      ED Medications  Medications - No data to display    Diagnostic Studies  Results Reviewed     None                 XR knee 4+ vw left injury    (Results Pending)              Procedures  Procedures         ED Course                                             Medical Decision Making  Differential diagnosis including but not limited to: Gout, arthritis, Lyme disease, Lupus, rheumatoid arthritis, cellulitis, bursitis, tendinitis, dislocation, fracture, sprain, strain, doubt septic arthritis or arterial occlusion. Plan: X-ray. Dispo pending. MDM: 69-year-old with knee pain. X-ray unremarkable. Suspect this could be bursitis given she was recently kneeling on the knee for prolonged periods of time. Recommended NSAIDs, ice, compression. Elevation. Low suspicion for infectious/septic arthritis. Recommended close outpatient follow-up. Discussed return parameters. Patient understands and agrees with this plan. Amount and/or Complexity of Data Reviewed  Radiology: ordered. Disposition  Final diagnoses:   Left knee pain     Time reflects when diagnosis was documented in both MDM as applicable and the Disposition within this note     Time User Action Codes Description Comment    7/14/2023  7:07 PM Sydnee Diego Add [Y77.787] Left knee pain       ED Disposition     ED Disposition   Discharge    Condition   Stable    Date/Time   Fri Jul 14, 2023  7:07 PM    Comment   Eduardo Epstein discharge to home/self care.                Follow-up Information     Follow up With Specialties Details Why Contact Info Additional Information    [address] [facility]   [address] [id number]  [facility] [address], [phone]          Discharge Medication List as of 7/14/2023  7:07 PM      CONTINUE these medications which have NOT CHANGED    Details   alendronate (FOSAMAX) 70 mg tablet Take 70 mg by mouth Once a week, Starting Mon 5/2/2022, Until Tue 5/9/2023, Historical Med      ATROVENT HFA 17 MCG/ACT inhaler 2 puffs 4 (four) times a day, Starting Fri 2/28/2020, Historical Med      ergocalciferol (VITAMIN D2) 50,000 units Take 50,000 Units by mouth once a week, Starting Mon 1/23/2023, Historical Med      esomeprazole (NexIUM) 20 mg capsule Take 1 capsule (20 mg total) by mouth 2 (two) times a day before meals, Starting Wed 2/15/2023, Normal      Flowflex COVID-19 Ag Home Test KIT Starting Tue 12/6/2022, Historical Med      fluconazole (DIFLUCAN) 150 mg tablet Take one by mouth today and repeat in 3 days, Historical Med      levocetirizine (XYZAL) 5 MG tablet levocetirizine 5 mg tablet, Historical Med      liraglutide (VICTOZA) injection Victoza 2-Jae 0.6 mg/0.1 mL (18 mg/3 mL) subcutaneous pen injector, Historical Med      montelukast (SINGULAIR) 10 mg tablet montelukast 10 mg tablet, Historical Med      pantoprazole (PROTONIX) 40 mg tablet Take 40 mg by mouth daily, Starting Mon 10/24/2022, Historical Med      predniSONE 10 mg tablet Take 1 tablet by mouth 2 (two) times a day, Historical Med      promethazine-dextromethorphan (PHENERGAN-DM) 6.25-15 mg/5 mL oral syrup take 5 milliliters by mouth four times a day if needed for cough, Historical Med      PROVENTIL  (90 Base) MCG/ACT inhaler Starting Wed 7/31/2019, Historical Med      simvastatin (ZOCOR) 20 mg tablet Take 20 mg by mouth daily with dinner, Starting Thu 12/8/2022, Historical Med      SYMBICORT 160-4.5 MCG/ACT inhaler Starting Mon 7/22/2019, Historical Med      temazepam (RESTORIL) 30 mg capsule take 1 capsule by mouth nightly if needed for sleep, Historical Med             No discharge procedures on file.     PDMP Review       Value Time User    PDMP Reviewed  Yes 11/21/2019 11:49 AM Naomi Parry PA-C          ED Provider  Electronically Signed by           Cinda Amezquita PA-C  07/14/23 2053

## 2023-07-24 ENCOUNTER — APPOINTMENT (EMERGENCY)
Dept: RADIOLOGY | Facility: HOSPITAL | Age: 68
DRG: 191 | End: 2023-07-24
Payer: MEDICARE

## 2023-07-24 ENCOUNTER — HOSPITAL ENCOUNTER (INPATIENT)
Facility: HOSPITAL | Age: 68
LOS: 2 days | Discharge: HOME | DRG: 191 | End: 2023-07-26
Attending: EMERGENCY MEDICINE | Admitting: INTERNAL MEDICINE
Payer: MEDICARE

## 2023-07-24 DIAGNOSIS — J44.1 COPD EXACERBATION (HCC): Primary | ICD-10-CM

## 2023-07-24 PROBLEM — J96.10 CHRONIC RESPIRATORY FAILURE (HCC): Status: ACTIVE | Noted: 2023-07-24

## 2023-07-24 PROBLEM — Z72.0 TOBACCO ABUSE: Status: ACTIVE | Noted: 2023-07-24

## 2023-07-24 LAB
2HR DELTA HS TROPONIN: 0 NG/L
ALBUMIN SERPL BCP-MCNC: 4.5 G/DL (ref 3.5–5)
ALP SERPL-CCNC: 66 U/L (ref 34–104)
ALT SERPL W P-5'-P-CCNC: 22 U/L (ref 7–52)
ANION GAP SERPL CALCULATED.3IONS-SCNC: 7 MMOL/L
AST SERPL W P-5'-P-CCNC: 17 U/L (ref 13–39)
ATRIAL RATE: 76 BPM
ATRIAL RATE: 80 BPM
BASOPHILS # BLD AUTO: 0.03 THOUSANDS/ÂΜL (ref 0–0.1)
BASOPHILS NFR BLD AUTO: 0 % (ref 0–1)
BILIRUB SERPL-MCNC: 0.68 MG/DL (ref 0.2–1)
BUN SERPL-MCNC: 18 MG/DL (ref 5–25)
CALCIUM SERPL-MCNC: 9.8 MG/DL (ref 8.4–10.2)
CARDIAC TROPONIN I PNL SERPL HS: 3 NG/L
CARDIAC TROPONIN I PNL SERPL HS: 3 NG/L
CHLORIDE SERPL-SCNC: 105 MMOL/L (ref 96–108)
CO2 SERPL-SCNC: 27 MMOL/L (ref 21–32)
CREAT SERPL-MCNC: 0.73 MG/DL (ref 0.6–1.3)
EOSINOPHIL # BLD AUTO: 0.02 THOUSAND/ÂΜL (ref 0–0.61)
EOSINOPHIL NFR BLD AUTO: 0 % (ref 0–6)
ERYTHROCYTE [DISTWIDTH] IN BLOOD BY AUTOMATED COUNT: 14 % (ref 11.6–15.1)
GFR SERPL CREATININE-BSD FRML MDRD: 84 ML/MIN/1.73SQ M
GLUCOSE SERPL-MCNC: 91 MG/DL (ref 65–140)
HCT VFR BLD AUTO: 43.5 % (ref 34.8–46.1)
HGB BLD-MCNC: 14.2 G/DL (ref 11.5–15.4)
IMM GRANULOCYTES # BLD AUTO: 0.03 THOUSAND/UL (ref 0–0.2)
IMM GRANULOCYTES NFR BLD AUTO: 0 % (ref 0–2)
LYMPHOCYTES # BLD AUTO: 2.01 THOUSANDS/ÂΜL (ref 0.6–4.47)
LYMPHOCYTES NFR BLD AUTO: 19 % (ref 14–44)
MCH RBC QN AUTO: 29.1 PG (ref 26.8–34.3)
MCHC RBC AUTO-ENTMCNC: 32.6 G/DL (ref 31.4–37.4)
MCV RBC AUTO: 89 FL (ref 82–98)
MONOCYTES # BLD AUTO: 0.96 THOUSAND/ÂΜL (ref 0.17–1.22)
MONOCYTES NFR BLD AUTO: 9 % (ref 4–12)
NEUTROPHILS # BLD AUTO: 7.62 THOUSANDS/ÂΜL (ref 1.85–7.62)
NEUTS SEG NFR BLD AUTO: 72 % (ref 43–75)
NRBC BLD AUTO-RTO: 0 /100 WBCS
P AXIS: -22 DEGREES
P AXIS: 133 DEGREES
PLATELET # BLD AUTO: 287 THOUSANDS/UL (ref 149–390)
PMV BLD AUTO: 9.9 FL (ref 8.9–12.7)
POTASSIUM SERPL-SCNC: 3.3 MMOL/L (ref 3.5–5.3)
PR INTERVAL: 124 MS
PR INTERVAL: 132 MS
PROT SERPL-MCNC: 7.4 G/DL (ref 6.4–8.4)
QRS AXIS: -15 DEGREES
QRS AXIS: 62 DEGREES
QRSD INTERVAL: 76 MS
QRSD INTERVAL: 84 MS
QT INTERVAL: 384 MS
QT INTERVAL: 388 MS
QTC INTERVAL: 432 MS
QTC INTERVAL: 447 MS
RBC # BLD AUTO: 4.88 MILLION/UL (ref 3.81–5.12)
SODIUM SERPL-SCNC: 139 MMOL/L (ref 135–147)
T WAVE AXIS: -8 DEGREES
T WAVE AXIS: 39 DEGREES
VENTRICULAR RATE: 76 BPM
VENTRICULAR RATE: 80 BPM
WBC # BLD AUTO: 10.67 THOUSAND/UL (ref 4.31–10.16)

## 2023-07-24 PROCEDURE — 94664 DEMO&/EVAL PT USE INHALER: CPT

## 2023-07-24 PROCEDURE — 93005 ELECTROCARDIOGRAM TRACING: CPT

## 2023-07-24 PROCEDURE — 94640 AIRWAY INHALATION TREATMENT: CPT

## 2023-07-24 PROCEDURE — 99291 CRITICAL CARE FIRST HOUR: CPT | Performed by: EMERGENCY MEDICINE

## 2023-07-24 PROCEDURE — 99284 EMERGENCY DEPT VISIT MOD MDM: CPT

## 2023-07-24 PROCEDURE — 96365 THER/PROPH/DIAG IV INF INIT: CPT

## 2023-07-24 PROCEDURE — 99223 1ST HOSP IP/OBS HIGH 75: CPT | Performed by: INTERNAL MEDICINE

## 2023-07-24 PROCEDURE — 96366 THER/PROPH/DIAG IV INF ADDON: CPT

## 2023-07-24 PROCEDURE — 85025 COMPLETE CBC W/AUTO DIFF WBC: CPT | Performed by: EMERGENCY MEDICINE

## 2023-07-24 PROCEDURE — 1123F ACP DISCUSS/DSCN MKR DOCD: CPT | Performed by: INTERNAL MEDICINE

## 2023-07-24 PROCEDURE — 93010 ELECTROCARDIOGRAM REPORT: CPT | Performed by: INTERNAL MEDICINE

## 2023-07-24 PROCEDURE — 84484 ASSAY OF TROPONIN QUANT: CPT | Performed by: EMERGENCY MEDICINE

## 2023-07-24 PROCEDURE — 94644 CONT INHLJ TX 1ST HOUR: CPT

## 2023-07-24 PROCEDURE — 80053 COMPREHEN METABOLIC PANEL: CPT | Performed by: EMERGENCY MEDICINE

## 2023-07-24 PROCEDURE — 36415 COLL VENOUS BLD VENIPUNCTURE: CPT | Performed by: EMERGENCY MEDICINE

## 2023-07-24 PROCEDURE — 71046 X-RAY EXAM CHEST 2 VIEWS: CPT

## 2023-07-24 PROCEDURE — 94760 N-INVAS EAR/PLS OXIMETRY 1: CPT

## 2023-07-24 RX ORDER — LEVALBUTEROL INHALATION SOLUTION 1.25 MG/3ML
1.25 SOLUTION RESPIRATORY (INHALATION)
Status: DISCONTINUED | OUTPATIENT
Start: 2023-07-24 | End: 2023-07-24

## 2023-07-24 RX ORDER — NICOTINE 21 MG/24HR
1 PATCH, TRANSDERMAL 24 HOURS TRANSDERMAL DAILY
Status: DISCONTINUED | OUTPATIENT
Start: 2023-07-25 | End: 2023-07-26 | Stop reason: HOSPADM

## 2023-07-24 RX ORDER — BUDESONIDE AND FORMOTEROL FUMARATE DIHYDRATE 160; 4.5 UG/1; UG/1
2 AEROSOL RESPIRATORY (INHALATION) 2 TIMES DAILY
Status: DISCONTINUED | OUTPATIENT
Start: 2023-07-24 | End: 2023-07-26 | Stop reason: HOSPADM

## 2023-07-24 RX ORDER — LEVALBUTEROL INHALATION SOLUTION 1.25 MG/3ML
SOLUTION RESPIRATORY (INHALATION)
Status: COMPLETED
Start: 2023-07-24 | End: 2023-07-24

## 2023-07-24 RX ORDER — PRAVASTATIN SODIUM 40 MG
40 TABLET ORAL
Status: DISCONTINUED | OUTPATIENT
Start: 2023-07-24 | End: 2023-07-26 | Stop reason: HOSPADM

## 2023-07-24 RX ORDER — MONTELUKAST SODIUM 10 MG/1
10 TABLET ORAL DAILY
Status: DISCONTINUED | OUTPATIENT
Start: 2023-07-25 | End: 2023-07-26 | Stop reason: HOSPADM

## 2023-07-24 RX ORDER — ALBUTEROL SULFATE 2.5 MG/3ML
5 SOLUTION RESPIRATORY (INHALATION) ONCE
Status: COMPLETED | OUTPATIENT
Start: 2023-07-24 | End: 2023-07-24

## 2023-07-24 RX ORDER — ALBUTEROL SULFATE 2.5 MG/3ML
2.5 SOLUTION RESPIRATORY (INHALATION) EVERY 6 HOURS PRN
Status: DISCONTINUED | OUTPATIENT
Start: 2023-07-24 | End: 2023-07-26 | Stop reason: HOSPADM

## 2023-07-24 RX ORDER — SODIUM CHLORIDE FOR INHALATION 0.9 %
3 VIAL, NEBULIZER (ML) INHALATION ONCE
Status: COMPLETED | OUTPATIENT
Start: 2023-07-24 | End: 2023-07-24

## 2023-07-24 RX ORDER — PANTOPRAZOLE SODIUM 20 MG/1
20 TABLET, DELAYED RELEASE ORAL
Status: DISCONTINUED | OUTPATIENT
Start: 2023-07-25 | End: 2023-07-26 | Stop reason: HOSPADM

## 2023-07-24 RX ORDER — ENOXAPARIN SODIUM 100 MG/ML
40 INJECTION SUBCUTANEOUS DAILY
Status: DISCONTINUED | OUTPATIENT
Start: 2023-07-25 | End: 2023-07-26 | Stop reason: HOSPADM

## 2023-07-24 RX ORDER — ACETAMINOPHEN 325 MG/1
650 TABLET ORAL EVERY 6 HOURS PRN
Status: DISCONTINUED | OUTPATIENT
Start: 2023-07-24 | End: 2023-07-26 | Stop reason: HOSPADM

## 2023-07-24 RX ORDER — METHYLPREDNISOLONE SODIUM SUCCINATE 40 MG/ML
40 INJECTION, POWDER, LYOPHILIZED, FOR SOLUTION INTRAMUSCULAR; INTRAVENOUS EVERY 8 HOURS SCHEDULED
Status: DISCONTINUED | OUTPATIENT
Start: 2023-07-24 | End: 2023-07-26

## 2023-07-24 RX ORDER — TEMAZEPAM 15 MG/1
30 CAPSULE ORAL
Status: DISCONTINUED | OUTPATIENT
Start: 2023-07-24 | End: 2023-07-26 | Stop reason: HOSPADM

## 2023-07-24 RX ORDER — LEVALBUTEROL 1.25 MG/.5ML
1.25 SOLUTION, CONCENTRATE RESPIRATORY (INHALATION)
Status: DISCONTINUED | OUTPATIENT
Start: 2023-07-24 | End: 2023-07-25

## 2023-07-24 RX ORDER — MAGNESIUM SULFATE HEPTAHYDRATE 40 MG/ML
2 INJECTION, SOLUTION INTRAVENOUS ONCE
Status: COMPLETED | OUTPATIENT
Start: 2023-07-24 | End: 2023-07-24

## 2023-07-24 RX ADMIN — BUDESONIDE AND FORMOTEROL FUMARATE DIHYDRATE 2 PUFF: 160; 4.5 AEROSOL RESPIRATORY (INHALATION) at 18:14

## 2023-07-24 RX ADMIN — ALBUTEROL SULFATE 10 MG: 2.5 SOLUTION RESPIRATORY (INHALATION) at 11:46

## 2023-07-24 RX ADMIN — METHYLPREDNISOLONE SODIUM SUCCINATE 40 MG: 40 INJECTION, POWDER, FOR SOLUTION INTRAMUSCULAR; INTRAVENOUS at 22:21

## 2023-07-24 RX ADMIN — IPRATROPIUM BROMIDE 0.5 MG: 0.5 SOLUTION RESPIRATORY (INHALATION) at 19:35

## 2023-07-24 RX ADMIN — MAGNESIUM SULFATE HEPTAHYDRATE 2 G: 40 INJECTION, SOLUTION INTRAVENOUS at 12:29

## 2023-07-24 RX ADMIN — ALBUTEROL SULFATE 5 MG: 2.5 SOLUTION RESPIRATORY (INHALATION) at 15:29

## 2023-07-24 RX ADMIN — ACETAMINOPHEN 650 MG: 325 TABLET ORAL at 20:10

## 2023-07-24 RX ADMIN — LEVALBUTEROL HYDROCHLORIDE 1.25 MG: 1.25 SOLUTION RESPIRATORY (INHALATION) at 19:35

## 2023-07-24 RX ADMIN — PRAVASTATIN SODIUM 40 MG: 40 TABLET ORAL at 18:14

## 2023-07-24 RX ADMIN — TEMAZEPAM 30 MG: 15 CAPSULE ORAL at 22:21

## 2023-07-24 RX ADMIN — PREDNISONE 50 MG: 20 TABLET ORAL at 11:45

## 2023-07-24 RX ADMIN — AZITHROMYCIN MONOHYDRATE 500 MG: 500 INJECTION, POWDER, LYOPHILIZED, FOR SOLUTION INTRAVENOUS at 18:23

## 2023-07-24 RX ADMIN — Medication 3 ML: at 11:47

## 2023-07-24 RX ADMIN — IPRATROPIUM BROMIDE 1 MG: 0.5 SOLUTION RESPIRATORY (INHALATION) at 11:46

## 2023-07-24 NOTE — ED NOTES
Ambulatory pulse oximetry test completed with direction from provider to use oxygen compliant with patients home routine. Patient states she only uses oxygen when "things get real bad" estimates using "2L every other day or so and at night". Pulse oximetry test done on 2L of oxygen, patient ambulated out of room. Patient oxygen saturations dropped quickly below 85%, test discontinued, for patient safety.      Abiola Sanchez  07/24/23 [id number]

## 2023-07-24 NOTE — ASSESSMENT & PLAN NOTE
· Patient coming in secondary to worsening shortness of breath for the past 3 days which has not improved with oral prednisone 60 mg and nebulizers at home  · Chest x-ray no acute cardiopulmonary disease noted  · Patient received oral prednisone in the emergency department  · will place on IV Solu-Medrol 40 3 times daily  · will give azithromycin given sputum production  · We will hold off on pulmonology consult at this time  · Respiratory protocol  · Continue to monitor

## 2023-07-24 NOTE — ASSESSMENT & PLAN NOTE
· Blood pressure adequately controlled  · Continue home blood pressure medication  · Continue to monitor

## 2023-07-24 NOTE — H&P
[facility]  H&P  Name: Kristyn Jung 76 y.o. female I MRN: [id number]  Unit/Bed#: -01 I Date of Admission: 7/24/2023   Date of Service: 7/24/2023 I Hospital Day: 0      Assessment/Plan   * Acute exacerbation of chronic obstructive pulmonary disease ([address])  Assessment & Plan  · Patient coming in secondary to worsening shortness of breath for the past 3 days which has not improved with oral prednisone 60 mg and nebulizers at home  · Chest x-ray no acute cardiopulmonary disease noted  · Patient received oral prednisone in the emergency department  · will place on IV Solu-Medrol 40 3 times daily  · will give azithromycin given sputum production  · We will hold off on pulmonology consult at this time  · Respiratory protocol  · Continue to monitor    Tobacco abuse  Assessment & Plan  · Nicotine patch ordered    Chronic respiratory failure (HCC)  Assessment & Plan  · Chronically on 3 L nasal cannula  · Currently on 3 L nasal cannula  · Respiratory protocol    Hypertension  Assessment & Plan  · Blood pressure adequately controlled  · Continue home blood pressure medication  · Continue to monitor    Anxiety  Assessment & Plan  · Stable       VTE Pharmacologic Prophylaxis: VTE Score: 3 Moderate Risk (Score 3-4) - Pharmacological DVT Prophylaxis Ordered: enoxaparin (Lovenox). Code Status: Level 1 - Full Code   Discussion with family: Updated  (significant other) at bedside. Anticipated Length of Stay: Patient will be admitted on an inpatient basis with an anticipated length of stay of greater than 2 midnights secondary to COPD exacerbation.     Total Time Spent on Date of Encounter in care of patient: 75 minutes This time was spent on one or more of the following: performing physical exam; counseling and coordination of care; obtaining or reviewing history; documenting in the medical record; reviewing/ordering tests, medications or procedures; communicating with other healthcare professionals and discussing with patient's family/caregivers. Chief Complaint: Shortness of breath    History of Present Illness:  Manny Cheek is a 76 y.o. female with a PMH of COPD, hypertension, diabetes who presents with COPD exacerbation. Patient states that over the past couple of days she has had worsening shortness of breath. She states she was trialing prednisone 60 mg while at home with no improvement. She also has been using her nebulizer. Patient does complain of sputum production, but denies any associated fevers or chills. Review of Systems:  Review of Systems   Constitutional: Negative for chills, fatigue, fever and unexpected weight change. HENT: Negative for congestion, ear pain, sore throat and tinnitus. Eyes: Negative for visual disturbance. Respiratory: Negative for cough, chest tightness, shortness of breath and wheezing. Cardiovascular: Negative for chest pain, palpitations and leg swelling. Gastrointestinal: Negative for abdominal pain, constipation, diarrhea, nausea and vomiting. Genitourinary: Negative for dysuria and frequency. Skin: Negative for rash. Neurological: Negative for dizziness, weakness, light-headedness, numbness and headaches.        Past Medical and Surgical History:   Past Medical History:   Diagnosis Date   • Asthma    • Colon polyp    • COPD (chronic obstructive pulmonary disease) ([address])    • Diabetes mellitus ([address])    • Hyperlipidemia        Past Surgical History:   Procedure Laterality Date   • COLONOSCOPY     • HERNIA REPAIR     • AR EXCISION GANGLION WRIST DORSAL/VOLAR PRIMARY Left 01/10/2020    Procedure: DORSAL WRIST GANGLION CYST EXCISION; DISTAL RADIUS CYST EXCISION;  Surgeon: Vianey Lai MD;  Location: [facility];  Service: Orthopedics   • AR INCISION EXTENSOR TENDON SHEATH WRIST Left 01/10/2020    Procedure: Whitney Jimenez;  Surgeon: Vianey Lai MD;  Location: [facility];  Service: Orthopedics       Meds/Allergies:  Prior to Admission medications    Medication Sig Start Date End Date Taking?  Authorizing Provider   alendronate (FOSAMAX) 70 mg tablet Take 70 mg by mouth Once a week 5/2/22 5/9/23  Historical Provider, MD   ATROVENT HFA 17 MCG/ACT inhaler 2 puffs 4 (four) times a day 2/28/20   Historical Provider, MD   ergocalciferol (VITAMIN D2) 50,000 units Take 50,000 Units by mouth once a week 1/23/23   Historical Provider, MD   esomeprazole (NexIUM) 20 mg capsule Take 1 capsule (20 mg total) by mouth 2 (two) times a day before meals 2/15/23   Enrrique Hinojosa DO   Flowflex COVID-19 Ag Home Test KIT  12/6/22   Historical Provider, MD   fluconazole (DIFLUCAN) 150 mg tablet Take one by mouth today and repeat in 3 days  Patient not taking: Reported on 3/22/2023 8/24/22   Historical Provider, MD   levocetirizine (XYZAL) 5 MG tablet levocetirizine 5 mg tablet 8/23/18   Historical Provider, MD   liraglutide (VICTOZA) injection Victoza 2-Jae 0.6 mg/0.1 mL (18 mg/3 mL) subcutaneous pen injector    Historical Provider, MD   montelukast (SINGULAIR) 10 mg tablet montelukast 10 mg tablet 6/6/19   Historical Provider, MD   pantoprazole (PROTONIX) 40 mg tablet Take 40 mg by mouth daily 10/24/22   Historical Provider, MD   predniSONE 10 mg tablet Take 1 tablet by mouth 2 (two) times a day    Historical Provider, MD   promethazine-dextromethorphan (PHENERGAN-DM) 6.25-15 mg/5 mL oral syrup take 5 milliliters by mouth four times a day if needed for cough  Patient not taking: Reported on 3/22/2023 12/7/22   Historical Provider, MD   PROVENTIL  (90 Base) MCG/ACT inhaler  7/31/19   Historical Provider, MD   simvastatin (ZOCOR) 20 mg tablet Take 20 mg by mouth daily with dinner 12/8/22   Historical Provider, MD   SYMBICORT 160-4.5 MCG/ACT inhaler  7/22/19   Historical Provider, MD   temazepam (RESTORIL) 30 mg capsule take 1 capsule by mouth nightly if needed for sleep 2/27/20   Historical Provider, MD CHRISTOPHER have reviewed home medications with patient personally. Allergies: Allergies   Allergen Reactions   • Cephalexin Hives   • Clindamycin Hives   • Metformin Other (See Comments)   • Sulfamethoxazole-Trimethoprim Hives       Social History:  Marital Status: /Civil Union   Patient Pre-hospital Living Situation: Home  Patient Pre-hospital Level of Mobility: walks    Substance Use History:   Social History     Substance and Sexual Activity   Alcohol Use Never     Social History     Tobacco Use   Smoking Status Some Days   • Types: Cigarettes   • Last attempt to quit:    • Years since quittin.5   Smokeless Tobacco Never   Tobacco Comments    One pack per one month and a half     Social History     Substance and Sexual Activity   Drug Use Never       Family History:  History reviewed. No pertinent family history. Physical Exam:     Vitals:   Blood Pressure: 148/82 (23 1629)  Pulse: 105 (23 1629)  Temperature: 98.5 °F (36.9 °C) (23 1629)  Respirations: 18 (23 1629)  SpO2: 95 % (23 162)    Physical Exam  Vitals and nursing note reviewed. Constitutional:       General: She is not in acute distress. Appearance: She is well-developed. Comments: 3 L nasal cannula  Chronically ill-appearing   HENT:      Head: Normocephalic and atraumatic. Eyes:      General: No scleral icterus. Conjunctiva/sclera: Conjunctivae normal.   Cardiovascular:      Rate and Rhythm: Normal rate and regular rhythm. Heart sounds: Normal heart sounds. No murmur heard. No friction rub. No gallop. Pulmonary:      Effort: Pulmonary effort is normal. No respiratory distress. Breath sounds: Wheezing present. No rales. Abdominal:      General: Bowel sounds are normal. There is no distension. Palpations: Abdomen is soft. Tenderness: There is no abdominal tenderness. Musculoskeletal:         General: Normal range of motion. Skin:     General: Skin is warm. Findings: No rash.    Neurological:      Mental Status: She is alert and oriented to person, place, and time. Additional Data:     Lab Results:  Results from last 7 days   Lab Units 07/24/23  1149   WBC Thousand/uL 10.67*   HEMOGLOBIN g/dL 14.2   HEMATOCRIT % 43.5   PLATELETS Thousands/uL 287   NEUTROS PCT % 72   LYMPHS PCT % 19   MONOS PCT % 9   EOS PCT % 0     Results from last 7 days   Lab Units 07/24/23  1149   SODIUM mmol/L 139   POTASSIUM mmol/L 3.3*   CHLORIDE mmol/L 105   CO2 mmol/L 27   BUN mg/dL 18   CREATININE mg/dL 0.73   ANION GAP mmol/L 7   CALCIUM mg/dL 9.8   ALBUMIN g/dL 4.5   TOTAL BILIRUBIN mg/dL 0.68   ALK PHOS U/L 66   ALT U/L 22   AST U/L 17   GLUCOSE RANDOM mg/dL 91                       Lines/Drains:  Invasive Devices     Peripheral Intravenous Line  Duration           Peripheral IV 07/24/23 Left Antecubital <1 day                    Imaging: Reviewed radiology reports from this admission including: chest xray  XR chest 2 views   ED Interpretation by Kareen Perez DO (07/24 1332)   NAD      Final Result by Darylene Gentles, MD (07/24 8953)      No acute cardiopulmonary disease. Workstation performed: [id number]             EKG and Other Studies Reviewed on Admission:   · EKG: NSR. HR 80.    ** Please Note: This note has been constructed using a voice recognition system.  **

## 2023-07-24 NOTE — RESPIRATORY THERAPY NOTE
RT Protocol Note  Wes Aranda 76 y.o. female MRN: [id number]  Unit/Bed#: -01 Encounter: [id number]    Assessment    Principal Problem:    Acute exacerbation of chronic obstructive pulmonary disease ([address] [facility])  Active Problems:    Anxiety    Hypertension    Chronic respiratory failure (HCC)    Tobacco abuse      Home Pulmonary Medications:  Inhaler/neb    Home Devices/Therapy: (P) Home O2 (3L)    Past Medical History:   Diagnosis Date    Asthma     Colon polyp     COPD (chronic obstructive pulmonary disease) (HCC)     Diabetes mellitus ([address] [facility])     Hyperlipidemia      Social History     Socioeconomic History    Marital status: /Civil Union     Spouse name: None    Number of children: None    Years of education: None    Highest education level: None   Occupational History    None   Tobacco Use    Smoking status: Some Days     Types: Cigarettes     Last attempt to quit:      Years since quittin.5    Smokeless tobacco: Never    Tobacco comments:     One pack per one month and a half   Vaping Use    Vaping Use: Never used   Substance and Sexual Activity    Alcohol use: Never    Drug use: Never    Sexual activity: None   Other Topics Concern    None   Social History Narrative    None     Social Determinants of Health     Financial Resource Strain: Not on file   Food Insecurity: Not on file   Transportation Needs: Not on file   Physical Activity: Not on file   Stress: Not on file   Social Connections: Not on file   Intimate Partner Violence: Not on file   Housing Stability: Not on file       Subjective         Objective    Physical Exam:   Assessment Type: (P) Assess only  General Appearance: (P) Alert, Awake  Respiratory Pattern: (P) Labored, Tachypneic  Chest Assessment: (P) Chest expansion symmetrical  Bilateral Breath Sounds: (P) Diminished, Expiratory wheezes  Cough: (P) Non-productive, Strong  O2 Device: (P) NC    Vitals:  Blood pressure 148/82, pulse (P) 100, temperature 98.5 °F (36.9 °C), resp.  rate Devorah Tillman ) (P) 24, height 5' (1.524 m), weight 60.3 kg (133 lb), SpO2 (P) 96 %. Imaging and other studies: I have personally reviewed pertinent reports. O2 Device: (P) NC     Plan    Respiratory Plan: (P) Mild Distress pathway        Resp Comments: (P) Protocol completed. Pt w/ PMH of COPD/Asthma and uses inhalers and nebs at home. Also has been receiving biological infusions. Will order tx TID.

## 2023-07-24 NOTE — ED PROVIDER NOTES
History  Chief Complaint   Patient presents with   • Asthma     Pt with asthma flare up, states regular at home meds are not working. Patient is 55-year-old female past medical history of COPD on 2.5 to 3 L home oxygen, diabetes presenting with asthma exacerbation. Patient notes shortness of breath over the last 3 days not responding to nebulizer breathing treatments which she states she is using daily but did not use today. Also takes 10 mg daily of prednisone but did not take today. Notes cooperative brown sputum beginning today but denies any fevers, chest pain, leg pain or swelling, nausea or vomiting, dizziness. Notes 5 prior intubations relative to her asthma and states her last ICU stay was several years ago. Prior to Admission Medications   Prescriptions Last Dose Informant Patient Reported? Taking?    ATROVENT HFA 17 MCG/ACT inhaler 2023 Self Yes Yes   Si puffs 4 (four) times a day   Flowflex COVID-19 Ag Home Test KIT  Self Yes No   PROVENTIL  (90 Base) MCG/ACT inhaler 2023 Self Yes Yes   SYMBICORT 160-4.5 MCG/ACT inhaler 2023 Self Yes Yes   alendronate (FOSAMAX) 70 mg tablet  Self Yes No   Sig: Take 70 mg by mouth Once a week   ergocalciferol (VITAMIN D2) 50,000 units 2023 Self Yes Yes   Sig: Take 50,000 Units by mouth once a week   esomeprazole (NexIUM) 20 mg capsule 2023  No Yes   Sig: Take 1 capsule (20 mg total) by mouth 2 (two) times a day before meals   fluconazole (DIFLUCAN) 150 mg tablet  Self Yes No   Sig: Take one by mouth today and repeat in 3 days   Patient not taking: Reported on 3/22/2023   levocetirizine (XYZAL) 5 MG tablet Unknown Self Yes No   Sig: levocetirizine 5 mg tablet   liraglutide (VICTOZA) injection Not Taking Self Yes No   Sig: Victoza 2-Jae 0.6 mg/0.1 mL (18 mg/3 mL) subcutaneous pen injector   Patient not taking: Reported on 2023   montelukast (SINGULAIR) 10 mg tablet 2023 Self Yes Yes   Sig: montelukast 10 mg tablet pantoprazole (PROTONIX) 40 mg tablet Not Taking Self Yes No   Sig: Take 40 mg by mouth daily   Patient not taking: Reported on 2023   predniSONE 10 mg tablet 2023 Self Yes Yes   Sig: Take 1 tablet by mouth 2 (two) times a day   promethazine-dextromethorphan (PHENERGAN-DM) 6.25-15 mg/5 mL oral syrup Past Week Self Yes Yes   simvastatin (ZOCOR) 20 mg tablet 2023 Self Yes Yes   Sig: Take 20 mg by mouth daily with dinner   temazepam (RESTORIL) 30 mg capsule 2023 Self Yes Yes   Sig: take 1 capsule by mouth nightly if needed for sleep      Facility-Administered Medications: None       Past Medical History:   Diagnosis Date   • Asthma    • Colon polyp    • COPD (chronic obstructive pulmonary disease) ([address] [facility])    • Diabetes mellitus ([address])    • Hyperlipidemia        Past Surgical History:   Procedure Laterality Date   • COLONOSCOPY     • HERNIA REPAIR     • [place] EXCISION GANGLION WRIST DORSAL/VOLAR PRIMARY Left 01/10/2020    Procedure: DORSAL WRIST GANGLION CYST EXCISION; DISTAL RADIUS CYST EXCISION;  Surgeon: Claribel Doss MD;  Location: [facility] OR;  Service: Orthopedics   • [place] INCISION EXTENSOR TENDON SHEATH WRIST Left 01/10/2020    Procedure: Jj Wray;  Surgeon: Claribel Doss MD;  Location: [facility] OR;  Service: Orthopedics       History reviewed. No pertinent family history. I have reviewed and agree with the history as documented.     E-Cigarette/Vaping   • E-Cigarette Use Never User      E-Cigarette/Vaping Substances   • Nicotine No    • THC No    • CBD No    • Flavoring No    • Other No    • Unknown No      Social History     Tobacco Use   • Smoking status: Some Days     Types: Cigarettes     Last attempt to quit: 2015     Years since quittin.5   • Smokeless tobacco: Never   • Tobacco comments:     One pack per one month and a half   Vaping Use   • Vaping Use: Never used   Substance Use Topics   • Alcohol use: Never   • Drug use: Never       Review of Systems   All other systems reviewed and are negative. Physical Exam  Physical Exam  Vitals reviewed. Constitutional:       General: She is not in acute distress. Appearance: Normal appearance. She is not ill-appearing. HENT:      Mouth/Throat:      Mouth: Mucous membranes are moist.   Eyes:      Conjunctiva/sclera: Conjunctivae normal.   Cardiovascular:      Rate and Rhythm: Normal rate and regular rhythm. Pulses: Normal pulses. Heart sounds: Normal heart sounds. Pulmonary:      Effort: Pulmonary effort is normal.      Comments: Diffuse expiratory wheezing and rhonchi in all lung fields without rales, no conversational dyspnea, no accessory muscle use, no retractions  Abdominal:      General: Abdomen is flat. Palpations: Abdomen is soft. Tenderness: There is no abdominal tenderness. Musculoskeletal:         General: No swelling. Normal range of motion. Cervical back: Neck supple. Right lower leg: No edema. Left lower leg: No edema. Skin:     General: Skin is warm and dry. Neurological:      General: No focal deficit present. Mental Status: She is alert.    Psychiatric:         Mood and Affect: Mood normal.         Vital Signs  ED Triage Vitals   Temperature Pulse Respirations Blood Pressure SpO2   07/24/23 1050 07/24/23 1050 07/24/23 1050 07/24/23 1050 07/24/23 1050   98.6 °F (37 °C) 90 20 148/70 96 %      Temp src Heart Rate Source Patient Position - Orthostatic VS BP Location FiO2 (%)   -- 07/24/23 1330 07/24/23 1330 07/24/23 1330 --    Monitor Lying Right arm       Pain Score       07/24/23 1629       No Pain           Vitals:    07/24/23 1050 07/24/23 1330 07/24/23 1629 07/24/23 1705   BP: 148/70 141/74 148/82    Pulse: 90 99 105 100   Patient Position - Orthostatic VS:  Lying           Visual Acuity      ED Medications  Medications   pantoprazole (PROTONIX) EC tablet 20 mg (has no administration in time range)   budesonide-formoterol (SYMBICORT) 160-4.5 mcg/act inhaler 2 puff (2 puffs Inhalation Given 7/24/23 1814)   pravastatin (PRAVACHOL) tablet 40 mg (40 mg Oral Given 7/24/23 1814)   montelukast (SINGULAIR) tablet 10 mg (has no administration in time range)   temazepam (RESTORIL) capsule 30 mg (has no administration in time range)   nicotine (NICODERM CQ) 14 mg/24hr TD 24 hr patch 1 patch (has no administration in time range)   acetaminophen (TYLENOL) tablet 650 mg (has no administration in time range)   enoxaparin (LOVENOX) subcutaneous injection 40 mg (has no administration in time range)   methylPREDNISolone sodium succinate (Solu-MEDROL) injection 40 mg (has no administration in time range)   azithromycin (ZITHROMAX) 500 mg in sodium chloride 0.9 % 250 mL IVPB (500 mg Intravenous New Bag 7/24/23 1823)   albuterol inhalation solution 2.5 mg (has no administration in time range)   levalbuterol (XOPENEX) inhalation solution 1.25 mg (has no administration in time range)   ipratropium (ATROVENT) 0.02 % inhalation solution 0.5 mg (has no administration in time range)   albuterol inhalation solution 10 mg (10 mg Nebulization Given 7/24/23 1146)     And   ipratropium (ATROVENT) 0.02 % inhalation solution 1 mg (1 mg Nebulization Given 7/24/23 1146)     And   sodium chloride 0.9 % inhalation solution 3 mL (3 mL Nebulization Given 7/24/23 1147)   predniSONE tablet 50 mg (50 mg Oral Given 7/24/23 1145)   magnesium sulfate 2 g/50 mL IVPB (premix) 2 g (0 g Intravenous Stopped 7/24/23 1415)   albuterol inhalation solution 5 mg (5 mg Nebulization Given 7/24/23 1529)       Diagnostic Studies  Results Reviewed     Procedure Component Value Units Date/Time    HS Troponin I 2hr [[id number]]  (Normal) Collected: 07/24/23 1353    Lab Status: Final result Specimen: Blood from Arm, Right Updated: 07/24/23 1436     hs TnI 2hr 3 ng/L      Delta 2hr hsTnI 0 ng/L     HS Troponin 0hr (reflex protocol) [[id number]]  (Normal) Collected: 07/24/23 1149    Lab Status: Final result Specimen: Blood from Arm, Left Updated: 07/24/23 1219     hs TnI 0hr 3 ng/L     Comprehensive metabolic panel [[id number]]  (Abnormal) Collected: 07/24/23 1149    Lab Status: Final result Specimen: Blood from Arm, Left Updated: 07/24/23 1215     Sodium 139 mmol/L      Potassium 3.3 mmol/L      Chloride 105 mmol/L      CO2 27 mmol/L      ANION GAP 7 mmol/L      BUN 18 mg/dL      Creatinine 0.73 mg/dL      Glucose 91 mg/dL      Calcium 9.8 mg/dL      AST 17 U/L      ALT 22 U/L      Alkaline Phosphatase 66 U/L      Total Protein 7.4 g/dL      Albumin 4.5 g/dL      Total Bilirubin 0.68 mg/dL      eGFR 84 ml/min/1.73sq m     Narrative:      [place] guidelines for Chronic Kidney Disease (CKD):   •  Stage 1 with normal or high GFR (GFR > 90 mL/min/1.73 square meters)  •  Stage 2 Mild CKD (GFR = 60-89 mL/min/1.73 square meters)  •  Stage 3A Moderate CKD (GFR = 45-59 mL/min/1.73 square meters)  •  Stage 3B Moderate CKD (GFR = 30-44 mL/min/1.73 square meters)  •  Stage 4 Severe CKD (GFR = 15-29 mL/min/1.73 square meters)  •  Stage 5 End Stage CKD (GFR <15 mL/min/1.73 square meters)  Note: GFR calculation is accurate only with a steady state creatinine    CBC and differential [[id number]]  (Abnormal) Collected: 07/24/23 1149    Lab Status: Final result Specimen: Blood from Arm, Left Updated: 07/24/23 1153     WBC 10.67 Thousand/uL      RBC 4.88 Million/uL      Hemoglobin 14.2 g/dL      Hematocrit 43.5 %      MCV 89 fL      MCH 29.1 pg      MCHC 32.6 g/dL      RDW 14.0 %      MPV 9.9 fL      Platelets 532 Thousands/uL      nRBC 0 /100 WBCs      Neutrophils Relative 72 %      Immat GRANS % 0 %      Lymphocytes Relative 19 %      Monocytes Relative 9 %      Eosinophils Relative 0 %      Basophils Relative 0 %      Neutrophils Absolute 7.62 Thousands/µL      Immature Grans Absolute 0.03 Thousand/uL      Lymphocytes Absolute 2.01 Thousands/µL      Monocytes Absolute 0.96 Thousand/µL      Eosinophils Absolute 0.02 Thousand/µL Basophils Absolute 0.03 Thousands/µL                  XR chest 2 views   ED Interpretation by Vladimir Mesa DO (07/24 3171)   NAD      Final Result by Nadir Talavera MD (07/24 3350)      No acute cardiopulmonary disease.                Workstation performed: [id number]                    Procedures  ECG 12 Lead Documentation Only    Date/Time: 7/24/2023 12:21 PM    Performed by: Vladimir Mesa DO  Authorized by: Vladimir Mesa DO    Patient location:  ED  Previous ECG:     Previous ECG:  Compared to current    Comparison ECG info:  New nonspecific T wave abnormality in the inferior leads  Interpretation:     Interpretation: non-specific    Rate:     ECG rate assessment: normal    Rhythm:     Rhythm: sinus rhythm    Ectopy:     Ectopy: none    QRS:     QRS axis:  Left    QRS intervals:  Normal  Conduction:     Conduction: normal    ST segments:     ST segments:  Normal  T waves:     T waves: non-specific      CriticalCare Time    Date/Time: 7/24/2023 6:58 PM    Performed by: Vladimir Mesa DO  Authorized by: Vladimir Mesa DO    Critical care provider statement:     Critical care time (minutes):  30    Critical care was necessary to treat or prevent imminent or life-threatening deterioration of the following conditions:  Respiratory failure    Critical care was time spent personally by me on the following activities:  Obtaining history from patient or surrogate, development of treatment plan with patient or surrogate, evaluation of patient's response to treatment, interpretation of cardiac output measurements, ordering and performing treatments and interventions, ordering and review of laboratory studies, ordering and review of radiographic studies, re-evaluation of patient's condition and examination of patient             ED Course  ED Course as of 07/24/23 1858   Mon Jul 24, 2023   1348 Work-up unremarkable, patient still with diffuse expiratory wheezing on exam, will give further nebulizer breathing treatments and obtain walking pulse ox and discuss disposition on reevaluation. 1514 Patient desaturated with walking pulse ox to the 70s, will give abx and admit. SBIRT 22yo+    Flowsheet Row Most Recent Value   Initial Alcohol Screen: US AUDIT-C     1. How often do you have a drink containing alcohol? 0 Filed at: 07/24/2023 1144   2. How many drinks containing alcohol do you have on a typical day you are drinking? 0 Filed at: 07/24/2023 1144   3b. FEMALE Any Age, or MALE 65+: How often do you have 4 or more drinks on one occassion? 0 Filed at: 07/24/2023 1144   Audit-C Score 0 Filed at: 07/24/2023 1144   ALEJANDOR: How many times in the past year have you. .. Used an illegal drug or used a prescription medication for non-medical reasons? Never Filed at: 07/24/2023 1144                    Medical Decision Making  Patient is a 70-year-old female past medical history of COPD on 2.5 to 3 L home oxygen, diabetes presenting with COPD exacerbation. Patient is well-appearing at bedside with stable vitals and in no acute distress. She is without any accessory muscle use, retractions or conversational dyspnea but does have diffuse expiratory wheezing in all lung fields without rales or rhonchi. As patient does practice cough beginning today with brown sputum will obtain chest x-ray as well as labs to assess for pneumonia, pneumothorax, ACS, give symptomatic management and reassess. Amount and/or Complexity of Data Reviewed  Labs: ordered. Radiology: ordered. Risk  Prescription drug management.           Disposition  Final diagnoses:   COPD exacerbation ([address])     Time reflects when diagnosis was documented in both MDM as applicable and the Disposition within this note     Time User Action Codes Description Comment    7/24/2023  3:19 PM Vianney Hogan Add [J44.1] COPD exacerbation [facility])       ED Disposition     ED Disposition   Admit    Condition Stable    Date/Time   Mon Jul 24, 2023  3:19 PM    Comment   Case was discussed with Dr. Luba Kwon and the patient's admission status was agreed to be Admission Status: inpatient status to the service of Dr. Luba Kwon . Follow-up Information    None         Current Discharge Medication List      CONTINUE these medications which have NOT CHANGED    Details   ATROVENT HFA 17 MCG/ACT inhaler 2 puffs 4 (four) times a day      ergocalciferol (VITAMIN D2) 50,000 units Take 50,000 Units by mouth once a week      esomeprazole (NexIUM) 20 mg capsule Take 1 capsule (20 mg total) by mouth 2 (two) times a day before meals  Qty: 62 capsule, Refills: 6    Associated Diagnoses: Left upper quadrant abdominal pain; Gastroesophageal reflux disease without esophagitis      montelukast (SINGULAIR) 10 mg tablet montelukast 10 mg tablet      predniSONE 10 mg tablet Take 1 tablet by mouth 2 (two) times a day      promethazine-dextromethorphan (PHENERGAN-DM) 6.25-15 mg/5 mL oral syrup       PROVENTIL  (90 Base) MCG/ACT inhaler Refills: 0    Comments: Substitution to a formulary equivalent within the same pharmaceutical class is authorized. simvastatin (ZOCOR) 20 mg tablet Take 20 mg by mouth daily with dinner      SYMBICORT 160-4.5 MCG/ACT inhaler Refills: 0      temazepam (RESTORIL) 30 mg capsule take 1 capsule by mouth nightly if needed for sleep      alendronate (FOSAMAX) 70 mg tablet Take 70 mg by mouth Once a week      Flowflex COVID-19 Ag Home Test KIT       fluconazole (DIFLUCAN) 150 mg tablet Take one by mouth today and repeat in 3 days      levocetirizine (XYZAL) 5 MG tablet levocetirizine 5 mg tablet      liraglutide (VICTOZA) injection Victoza 2-Jae 0.6 mg/0.1 mL (18 mg/3 mL) subcutaneous pen injector      pantoprazole (PROTONIX) 40 mg tablet Take 40 mg by mouth daily             No discharge procedures on file.     PDMP Review       Value Time User    PDMP Reviewed  Yes 11/21/2019 11:49 AM Janette Bobby JERMAINE          ED Provider  Electronically Signed by           Sharon Paulson DO  07/24/23 5966

## 2023-07-25 LAB
ANION GAP SERPL CALCULATED.3IONS-SCNC: 6 MMOL/L
BASOPHILS # BLD AUTO: 0.01 THOUSANDS/ÂΜL (ref 0–0.1)
BASOPHILS NFR BLD AUTO: 0 % (ref 0–1)
BUN SERPL-MCNC: 16 MG/DL (ref 5–25)
CALCIUM SERPL-MCNC: 9.5 MG/DL (ref 8.4–10.2)
CHLORIDE SERPL-SCNC: 106 MMOL/L (ref 96–108)
CO2 SERPL-SCNC: 27 MMOL/L (ref 21–32)
CREAT SERPL-MCNC: 0.6 MG/DL (ref 0.6–1.3)
EOSINOPHIL # BLD AUTO: 0 THOUSAND/ÂΜL (ref 0–0.61)
EOSINOPHIL NFR BLD AUTO: 0 % (ref 0–6)
ERYTHROCYTE [DISTWIDTH] IN BLOOD BY AUTOMATED COUNT: 13.6 % (ref 11.6–15.1)
GFR SERPL CREATININE-BSD FRML MDRD: 93 ML/MIN/1.73SQ M
GLUCOSE SERPL-MCNC: 161 MG/DL (ref 65–140)
GLUCOSE SERPL-MCNC: 196 MG/DL (ref 65–140)
GLUCOSE SERPL-MCNC: 199 MG/DL (ref 65–140)
GLUCOSE SERPL-MCNC: 214 MG/DL (ref 65–140)
GLUCOSE SERPL-MCNC: 228 MG/DL (ref 65–140)
HCT VFR BLD AUTO: 44.9 % (ref 34.8–46.1)
HGB BLD-MCNC: 14.5 G/DL (ref 11.5–15.4)
IMM GRANULOCYTES # BLD AUTO: 0.07 THOUSAND/UL (ref 0–0.2)
IMM GRANULOCYTES NFR BLD AUTO: 1 % (ref 0–2)
LYMPHOCYTES # BLD AUTO: 0.6 THOUSANDS/ÂΜL (ref 0.6–4.47)
LYMPHOCYTES NFR BLD AUTO: 6 % (ref 14–44)
MCH RBC QN AUTO: 29.2 PG (ref 26.8–34.3)
MCHC RBC AUTO-ENTMCNC: 32.3 G/DL (ref 31.4–37.4)
MCV RBC AUTO: 90 FL (ref 82–98)
MONOCYTES # BLD AUTO: 0.2 THOUSAND/ÂΜL (ref 0.17–1.22)
MONOCYTES NFR BLD AUTO: 2 % (ref 4–12)
NEUTROPHILS # BLD AUTO: 9.12 THOUSANDS/ÂΜL (ref 1.85–7.62)
NEUTS SEG NFR BLD AUTO: 91 % (ref 43–75)
NRBC BLD AUTO-RTO: 0 /100 WBCS
PLATELET # BLD AUTO: 307 THOUSANDS/UL (ref 149–390)
PMV BLD AUTO: 9.7 FL (ref 8.9–12.7)
POTASSIUM SERPL-SCNC: 4.3 MMOL/L (ref 3.5–5.3)
RBC # BLD AUTO: 4.97 MILLION/UL (ref 3.81–5.12)
SODIUM SERPL-SCNC: 139 MMOL/L (ref 135–147)
WBC # BLD AUTO: 10 THOUSAND/UL (ref 4.31–10.16)

## 2023-07-25 PROCEDURE — 82948 REAGENT STRIP/BLOOD GLUCOSE: CPT

## 2023-07-25 PROCEDURE — 94640 AIRWAY INHALATION TREATMENT: CPT

## 2023-07-25 PROCEDURE — 94760 N-INVAS EAR/PLS OXIMETRY 1: CPT

## 2023-07-25 PROCEDURE — 85025 COMPLETE CBC W/AUTO DIFF WBC: CPT | Performed by: INTERNAL MEDICINE

## 2023-07-25 PROCEDURE — 99232 SBSQ HOSP IP/OBS MODERATE 35: CPT | Performed by: FAMILY MEDICINE

## 2023-07-25 PROCEDURE — 80048 BASIC METABOLIC PNL TOTAL CA: CPT | Performed by: INTERNAL MEDICINE

## 2023-07-25 RX ORDER — LORATADINE 10 MG/1
10 TABLET ORAL DAILY
Status: DISCONTINUED | OUTPATIENT
Start: 2023-07-25 | End: 2023-07-26 | Stop reason: HOSPADM

## 2023-07-25 RX ORDER — INSULIN LISPRO 100 [IU]/ML
1-5 INJECTION, SOLUTION INTRAVENOUS; SUBCUTANEOUS
Status: DISCONTINUED | OUTPATIENT
Start: 2023-07-25 | End: 2023-07-26 | Stop reason: HOSPADM

## 2023-07-25 RX ORDER — LEVALBUTEROL INHALATION SOLUTION 1.25 MG/3ML
1.25 SOLUTION RESPIRATORY (INHALATION)
Status: DISCONTINUED | OUTPATIENT
Start: 2023-07-25 | End: 2023-07-26 | Stop reason: HOSPADM

## 2023-07-25 RX ORDER — LEVALBUTEROL INHALATION SOLUTION 1.25 MG/3ML
SOLUTION RESPIRATORY (INHALATION)
Status: COMPLETED
Start: 2023-07-25 | End: 2023-07-25

## 2023-07-25 RX ADMIN — AZITHROMYCIN MONOHYDRATE 500 MG: 500 INJECTION, POWDER, LYOPHILIZED, FOR SOLUTION INTRAVENOUS at 18:34

## 2023-07-25 RX ADMIN — LEVALBUTEROL HYDROCHLORIDE 1.25 MG: 1.25 SOLUTION RESPIRATORY (INHALATION) at 19:40

## 2023-07-25 RX ADMIN — TEMAZEPAM 30 MG: 15 CAPSULE ORAL at 21:35

## 2023-07-25 RX ADMIN — METHYLPREDNISOLONE SODIUM SUCCINATE 40 MG: 40 INJECTION, POWDER, FOR SOLUTION INTRAMUSCULAR; INTRAVENOUS at 21:35

## 2023-07-25 RX ADMIN — PANTOPRAZOLE SODIUM 20 MG: 20 TABLET, DELAYED RELEASE ORAL at 05:14

## 2023-07-25 RX ADMIN — INSULIN LISPRO 1 UNITS: 100 INJECTION, SOLUTION INTRAVENOUS; SUBCUTANEOUS at 12:00

## 2023-07-25 RX ADMIN — LORATADINE 10 MG: 10 TABLET ORAL at 13:45

## 2023-07-25 RX ADMIN — ENOXAPARIN SODIUM 40 MG: 40 INJECTION SUBCUTANEOUS at 09:03

## 2023-07-25 RX ADMIN — IPRATROPIUM BROMIDE 0.5 MG: 0.5 SOLUTION RESPIRATORY (INHALATION) at 07:46

## 2023-07-25 RX ADMIN — BUDESONIDE AND FORMOTEROL FUMARATE DIHYDRATE 2 PUFF: 160; 4.5 AEROSOL RESPIRATORY (INHALATION) at 09:04

## 2023-07-25 RX ADMIN — METHYLPREDNISOLONE SODIUM SUCCINATE 40 MG: 40 INJECTION, POWDER, FOR SOLUTION INTRAMUSCULAR; INTRAVENOUS at 13:45

## 2023-07-25 RX ADMIN — IPRATROPIUM BROMIDE 0.5 MG: 0.5 SOLUTION RESPIRATORY (INHALATION) at 19:40

## 2023-07-25 RX ADMIN — BUDESONIDE AND FORMOTEROL FUMARATE DIHYDRATE 2 PUFF: 160; 4.5 AEROSOL RESPIRATORY (INHALATION) at 18:35

## 2023-07-25 RX ADMIN — PRAVASTATIN SODIUM 40 MG: 40 TABLET ORAL at 18:35

## 2023-07-25 RX ADMIN — IPRATROPIUM BROMIDE 0.5 MG: 0.5 SOLUTION RESPIRATORY (INHALATION) at 13:55

## 2023-07-25 RX ADMIN — LEVALBUTEROL HYDROCHLORIDE 1.25 MG: 1.25 SOLUTION RESPIRATORY (INHALATION) at 07:46

## 2023-07-25 RX ADMIN — ALBUTEROL SULFATE 2.5 MG: 2.5 SOLUTION RESPIRATORY (INHALATION) at 03:37

## 2023-07-25 RX ADMIN — INSULIN LISPRO 2 UNITS: 100 INJECTION, SOLUTION INTRAVENOUS; SUBCUTANEOUS at 09:04

## 2023-07-25 RX ADMIN — INSULIN LISPRO 1 UNITS: 100 INJECTION, SOLUTION INTRAVENOUS; SUBCUTANEOUS at 17:00

## 2023-07-25 RX ADMIN — METHYLPREDNISOLONE SODIUM SUCCINATE 40 MG: 40 INJECTION, POWDER, FOR SOLUTION INTRAMUSCULAR; INTRAVENOUS at 05:15

## 2023-07-25 RX ADMIN — MONTELUKAST 10 MG: 10 TABLET, FILM COATED ORAL at 09:03

## 2023-07-25 RX ADMIN — LEVALBUTEROL HYDROCHLORIDE 1.25 MG: 1.25 SOLUTION RESPIRATORY (INHALATION) at 13:56

## 2023-07-25 NOTE — CASE MANAGEMENT
Case Management Assessment & Discharge Planning Note    Patient name Eduardo Maryland  Location /-01 MRN [id number]  : 1955 Date 2023       Current Admission Date: 2023  Current Admission Diagnosis:Acute exacerbation of chronic obstructive pulmonary disease [facility])   Patient Active Problem List    Diagnosis Date Noted   • Chronic respiratory failure ([address]) 2023   • Tobacco abuse 2023   • De Quervain's tenosynovitis, left 2019   • Anxiety 2014   • Acute exacerbation of chronic obstructive pulmonary disease ([address]) 2014   • Hypertension 2014      LOS (days): 1  Geometric Mean LOS (GMLOS) (days): 2.70  Days to GMLOS:1.7     OBJECTIVE:    Risk of Unplanned Readmission Score: 12.73     Current admission status: Inpatient    Preferred Pharmacy:   [address] [place] [address]  Phone: [phone] Fax: [phone]    Primary Care Provider: Sofya Streeter MD    Primary Insurance: MEDICARE  Secondary Insurance: BLUE CROSS    ASSESSMENT:  [place] Proxies    There are no active Health Care Proxies on file. DISCHARGE DETAILS:    Contacts  Contact Method:  In Person  Reason/Outcome: Continuity of Care    Would you like to participate in our [address] service program?  : No - Declined    Treatment Team Recommendation: Other (TBD)  Discharge Destination Plan[de-identified] Other (TBD)

## 2023-07-25 NOTE — ASSESSMENT & PLAN NOTE
· Blood pressure adequately controlled  · Not on any chronic antihypertensive medications  · Continue to monitor.

## 2023-07-25 NOTE — PROGRESS NOTES
[address]  Progress Note  Name: Jim Lawson  MRN: [id number]  Unit/Bed#: -01 I Date of Admission: 7/24/2023   Date of Service: 7/25/2023 I Hospital Day: 1    Assessment/Plan   Chronic respiratory failure (HCC)  Assessment & Plan  · Chronically on 3 L nasal cannula due to underlying history of asthma/COPD  · Currently at baseline. * Acute exacerbation of chronic obstructive pulmonary disease ([address])  Assessment & Plan  Patient coming in secondary to worsening shortness of breath for the past 3 days which has not improved with oral prednisone 60 mg and nebulizers at home  · Chest x-ray no acute cardiopulmonary disease noted  · Patient received oral prednisone in the emergency department  · will place on IV Solu-Medrol 40 3 times daily  · will give azithromycin given sputum production  · continue Xopenex/Atrovent nebulizers  · Continue Symbicort inhaler  · Respiratory protocol  · Continue to monitor    Tobacco abuse  Assessment & Plan  · Nicotine patch ordered  · Smoking cessation counseling done. Hypertension  Assessment & Plan  · Blood pressure adequately controlled  · Not on any chronic antihypertensive medications  · Continue to monitor. Anxiety  Assessment & Plan  · Stable           VTE Pharmacologic Prophylaxis:   Pharmacologic: Enoxaparin (Lovenox)  Mechanical VTE Prophylaxis in Place: Yes    Patient Centered Rounds: I have performed bedside rounds with nursing staff today. Discussions with Specialists or Other Care Team Provider: rebekah    Education and Discussions with Family / Patient: harpal patient    Time Spent for Care: 30 minutes. More than 50% of total time spent on counseling and coordination of care as described above.     Current Length of Stay: 1 day(s)    Current Patient Status: Inpatient   Certification Statement: The patient will continue to require additional inpatient hospital stay due to needs iv steroids    Discharge Plan: ?48hrs    Code Status: Level 1 - Full Code      Subjective:   Overall does feel better  This morning patient was concerned she was not receiving IV steroids and only oral prednisone. Patient was reassured with nursing in presence that patient was receiving IV Solu-Medrol every 8 hours along with IV azithromycin. She is on 3 L O2 which is her baseline. Objective:     Vitals:   Temp (24hrs), Av.1 °F (36.7 °C), Min:97.9 °F (36.6 °C), Max:98.5 °F (36.9 °C)    Temp:  [97.9 °F (36.6 °C)-98.5 °F (36.9 °C)] 97.9 °F (36.6 °C)  HR:  [] 88  Resp:  [18-24] 18  BP: (137-148)/(74-82) 142/81  SpO2:  [92 %-98 %] 92 %  Body mass index is 25.97 kg/m². Input and Output Summary (last 24 hours): Intake/Output Summary (Last 24 hours) at 2023 1144  Last data filed at 2023 0500  Gross per 24 hour   Intake 650 ml   Output --   Net 650 ml       Physical Exam:     Physical Exam  Constitutional:       General: She is not in acute distress. Appearance: She is normal weight. She is not toxic-appearing. HENT:      Mouth/Throat:      Mouth: Mucous membranes are moist.      Pharynx: Oropharynx is clear. Cardiovascular:      Rate and Rhythm: Normal rate and regular rhythm. Pulses: Normal pulses. Pulmonary:      Effort: Pulmonary effort is normal.      Breath sounds: Wheezing present. Abdominal:      General: Abdomen is flat. Bowel sounds are normal.      Palpations: Abdomen is soft. Musculoskeletal:      Right lower leg: No edema. Left lower leg: No edema. Neurological:      General: No focal deficit present. Mental Status: She is alert and oriented to person, place, and time.        Additional Data:     Labs:    Results from last 7 days   Lab Units 23  0459   WBC Thousand/uL 10.00   HEMOGLOBIN g/dL 14.5   HEMATOCRIT % 44.9   PLATELETS Thousands/uL 307   NEUTROS PCT % 91*   LYMPHS PCT % 6*   MONOS PCT % 2*   EOS PCT % 0     Results from last 7 days   Lab Units 23  0459 23  1149   SODIUM mmol/L 139 139   POTASSIUM mmol/L 4.3 3.3*   CHLORIDE mmol/L 106 105   CO2 mmol/L 27 27   BUN mg/dL 16 18   CREATININE mg/dL 0.60 0.73   ANION GAP mmol/L 6 7   CALCIUM mg/dL 9.5 9.8   ALBUMIN g/dL  --  4.5   TOTAL BILIRUBIN mg/dL  --  0.68   ALK PHOS U/L  --  66   ALT U/L  --  22   AST U/L  --  17   GLUCOSE RANDOM mg/dL 161* 91         Results from last 7 days   Lab Units 07/25/23  1124 07/25/23  0907   POC GLUCOSE mg/dl 199* 214*                   * I Have Reviewed All Lab Data Listed Above. * Additional Pertinent Lab Tests Reviewed: All Labs Within Last 24 Hours Reviewed    Recent Cultures (last 7 days):           Last 24 Hours Medication List:   Current Facility-Administered Medications   Medication Dose Route Frequency Provider Last Rate   • acetaminophen  650 mg Oral Q6H PRN Nonda Cerise, DO     • albuterol  2.5 mg Nebulization Q6H PRN Nonda Cerise, DO     • azithromycin  500 mg Intravenous Q24H Nonda Cerise,  mg (07/24/23 1823)   • budesonide-formoterol  2 puff Inhalation BID Nonda Cerise, DO     • enoxaparin  40 mg Subcutaneous Daily Nonda Cerise, DO     • insulin lispro  1-5 Units Subcutaneous TID JUNE Lai MD     • ipratropium  0.5 mg Nebulization TID Abhijit Buchanan MD     • levalbuterol  1.25 mg Nebulization TID Abhijit Buchanan MD     • methylPREDNISolone sodium succinate  40 mg Intravenous Count includes the [facility] Nonda Cerise, DO     • montelukast  10 mg Oral Daily Nonda Cerise, DO     • nicotine  1 patch Transdermal Daily Nonda Cerise, DO     • pantoprazole  20 mg Oral Early Morning Nonda Cerise, DO     • pravastatin  40 mg Oral Daily With Bg Hughes DO     • temazepam  30 mg Oral HS Nonda Cerise, DO          Today, Patient Was Seen By: Ramesh Palomo M.D.     ** Please Note: Dictation voice to text software may have been used in the creation of this document.  **

## 2023-07-25 NOTE — ASSESSMENT & PLAN NOTE
· Chronically on 3 L nasal cannula due to underlying history of asthma/COPD  · Currently at baseline.

## 2023-07-25 NOTE — ASSESSMENT & PLAN NOTE
Patient coming in secondary to worsening shortness of breath for the past 3 days which has not improved with oral prednisone 60 mg and nebulizers at home  · Chest x-ray no acute cardiopulmonary disease noted  · Patient received oral prednisone in the emergency department  · will place on IV Solu-Medrol 40 3 times daily  · will give azithromycin given sputum production  · continue Xopenex/Atrovent nebulizers  · Continue Symbicort inhaler  · Respiratory protocol  · Continue to monitor

## 2023-07-26 VITALS
TEMPERATURE: 98 F | OXYGEN SATURATION: 94 % | BODY MASS INDEX: 26.11 KG/M2 | SYSTOLIC BLOOD PRESSURE: 154 MMHG | WEIGHT: 133 LBS | DIASTOLIC BLOOD PRESSURE: 82 MMHG | HEART RATE: 106 BPM | HEIGHT: 60 IN | RESPIRATION RATE: 18 BRPM

## 2023-07-26 LAB
ANION GAP SERPL CALCULATED.3IONS-SCNC: 7 MMOL/L
BASOPHILS # BLD AUTO: 0.02 THOUSANDS/ÂΜL (ref 0–0.1)
BASOPHILS NFR BLD AUTO: 0 % (ref 0–1)
BUN SERPL-MCNC: 20 MG/DL (ref 5–25)
CALCIUM SERPL-MCNC: 9.6 MG/DL (ref 8.4–10.2)
CHLORIDE SERPL-SCNC: 108 MMOL/L (ref 96–108)
CO2 SERPL-SCNC: 25 MMOL/L (ref 21–32)
CREAT SERPL-MCNC: 0.65 MG/DL (ref 0.6–1.3)
EOSINOPHIL # BLD AUTO: 0 THOUSAND/ÂΜL (ref 0–0.61)
EOSINOPHIL NFR BLD AUTO: 0 % (ref 0–6)
ERYTHROCYTE [DISTWIDTH] IN BLOOD BY AUTOMATED COUNT: 13.4 % (ref 11.6–15.1)
GFR SERPL CREATININE-BSD FRML MDRD: 91 ML/MIN/1.73SQ M
GLUCOSE SERPL-MCNC: 266 MG/DL (ref 65–140)
GLUCOSE SERPL-MCNC: 284 MG/DL (ref 65–140)
GLUCOSE SERPL-MCNC: 314 MG/DL (ref 65–140)
HCT VFR BLD AUTO: 41.3 % (ref 34.8–46.1)
HGB BLD-MCNC: 13.4 G/DL (ref 11.5–15.4)
IMM GRANULOCYTES # BLD AUTO: 0.13 THOUSAND/UL (ref 0–0.2)
IMM GRANULOCYTES NFR BLD AUTO: 1 % (ref 0–2)
LYMPHOCYTES # BLD AUTO: 0.91 THOUSANDS/ÂΜL (ref 0.6–4.47)
LYMPHOCYTES NFR BLD AUTO: 7 % (ref 14–44)
MCH RBC QN AUTO: 29.2 PG (ref 26.8–34.3)
MCHC RBC AUTO-ENTMCNC: 32.4 G/DL (ref 31.4–37.4)
MCV RBC AUTO: 90 FL (ref 82–98)
MONOCYTES # BLD AUTO: 0.68 THOUSAND/ÂΜL (ref 0.17–1.22)
MONOCYTES NFR BLD AUTO: 5 % (ref 4–12)
NEUTROPHILS # BLD AUTO: 11.13 THOUSANDS/ÂΜL (ref 1.85–7.62)
NEUTS SEG NFR BLD AUTO: 87 % (ref 43–75)
NRBC BLD AUTO-RTO: 0 /100 WBCS
PLATELET # BLD AUTO: 330 THOUSANDS/UL (ref 149–390)
PMV BLD AUTO: 10.3 FL (ref 8.9–12.7)
POTASSIUM SERPL-SCNC: 4 MMOL/L (ref 3.5–5.3)
RBC # BLD AUTO: 4.59 MILLION/UL (ref 3.81–5.12)
SODIUM SERPL-SCNC: 140 MMOL/L (ref 135–147)
WBC # BLD AUTO: 12.87 THOUSAND/UL (ref 4.31–10.16)

## 2023-07-26 PROCEDURE — 82948 REAGENT STRIP/BLOOD GLUCOSE: CPT

## 2023-07-26 PROCEDURE — 94640 AIRWAY INHALATION TREATMENT: CPT

## 2023-07-26 PROCEDURE — 94760 N-INVAS EAR/PLS OXIMETRY 1: CPT

## 2023-07-26 PROCEDURE — 80048 BASIC METABOLIC PNL TOTAL CA: CPT | Performed by: FAMILY MEDICINE

## 2023-07-26 PROCEDURE — 85025 COMPLETE CBC W/AUTO DIFF WBC: CPT | Performed by: FAMILY MEDICINE

## 2023-07-26 PROCEDURE — 99239 HOSP IP/OBS DSCHRG MGMT >30: CPT | Performed by: FAMILY MEDICINE

## 2023-07-26 RX ORDER — PREDNISONE 10 MG/1
10 TABLET ORAL 2 TIMES DAILY
Refills: 0
Start: 2023-08-09

## 2023-07-26 RX ORDER — PREDNISONE 20 MG/1
TABLET ORAL
Qty: 21 TABLET | Refills: 0 | Status: SHIPPED | OUTPATIENT
Start: 2023-07-26 | End: 2023-08-07

## 2023-07-26 RX ORDER — AZITHROMYCIN 500 MG/1
500 TABLET, FILM COATED ORAL ONCE
Status: COMPLETED | OUTPATIENT
Start: 2023-07-26 | End: 2023-07-26

## 2023-07-26 RX ORDER — NICOTINE 21 MG/24HR
1 PATCH, TRANSDERMAL 24 HOURS TRANSDERMAL DAILY
Qty: 28 PATCH | Refills: 0 | Status: SHIPPED | OUTPATIENT
Start: 2023-07-26

## 2023-07-26 RX ORDER — PREDNISONE 20 MG/1
60 TABLET ORAL DAILY
Status: DISCONTINUED | OUTPATIENT
Start: 2023-07-26 | End: 2023-07-26 | Stop reason: HOSPADM

## 2023-07-26 RX ADMIN — AZITHROMYCIN 500 MG: 500 TABLET, FILM COATED ORAL at 11:10

## 2023-07-26 RX ADMIN — LEVALBUTEROL HYDROCHLORIDE 1.25 MG: 1.25 SOLUTION RESPIRATORY (INHALATION) at 08:03

## 2023-07-26 RX ADMIN — PANTOPRAZOLE SODIUM 20 MG: 20 TABLET, DELAYED RELEASE ORAL at 05:11

## 2023-07-26 RX ADMIN — INSULIN LISPRO 3 UNITS: 100 INJECTION, SOLUTION INTRAVENOUS; SUBCUTANEOUS at 11:45

## 2023-07-26 RX ADMIN — IPRATROPIUM BROMIDE 0.5 MG: 0.5 SOLUTION RESPIRATORY (INHALATION) at 08:04

## 2023-07-26 RX ADMIN — PREDNISONE 60 MG: 20 TABLET ORAL at 11:06

## 2023-07-26 RX ADMIN — INSULIN LISPRO 3 UNITS: 100 INJECTION, SOLUTION INTRAVENOUS; SUBCUTANEOUS at 07:35

## 2023-07-26 RX ADMIN — ENOXAPARIN SODIUM 40 MG: 40 INJECTION SUBCUTANEOUS at 11:06

## 2023-07-26 RX ADMIN — BUDESONIDE AND FORMOTEROL FUMARATE DIHYDRATE 2 PUFF: 160; 4.5 AEROSOL RESPIRATORY (INHALATION) at 11:07

## 2023-07-26 RX ADMIN — LORATADINE 10 MG: 10 TABLET ORAL at 11:06

## 2023-07-26 RX ADMIN — METHYLPREDNISOLONE SODIUM SUCCINATE 40 MG: 40 INJECTION, POWDER, FOR SOLUTION INTRAMUSCULAR; INTRAVENOUS at 05:11

## 2023-07-26 RX ADMIN — MONTELUKAST 10 MG: 10 TABLET, FILM COATED ORAL at 11:06

## 2023-07-26 NOTE — ASSESSMENT & PLAN NOTE
Patient coming in secondary to worsening shortness of breath for the past 3 days which has not improved with oral prednisone 60 mg and nebulizers at home  · Chest x-ray no acute cardiopulmonary disease noted  · Patient received oral prednisone in the emergency department  · will place on IV Solu-Medrol 40 3 times daily - > transition to oral prednisone taper today. · S/p 3 days azithromycin given sputum production  · s/p Xopenex/Atrovent nebulizers  · Continue home albuterol/Atrovent/Symbicort inhaler    -Reports feeling significantly better and at baseline. Will DC home on prednisone taper along with home inhalers. Outpatient follow-up with patient's pulmonologist in 2 days.

## 2023-07-26 NOTE — DISCHARGE SUMMARY
[address]  Discharge- Kristyn Newer 1955, 76 y.o. female MRN: [id number]  Unit/Bed#: [facility] Encounter: [id number]  Primary Care Provider: Adela Busby MD   Date and time admitted to hospital: 7/24/2023 11:09 AM    Chronic respiratory failure ([address])  Assessment & Plan  · Chronically on 3 L nasal cannula due to underlying history of asthma/COPD  · Currently at baseline. * Acute exacerbation of chronic obstructive pulmonary disease ([address])  Assessment & Plan  Patient coming in secondary to worsening shortness of breath for the past 3 days which has not improved with oral prednisone 60 mg and nebulizers at home  · Chest x-ray no acute cardiopulmonary disease noted  · Patient received oral prednisone in the emergency department  · will place on IV Solu-Medrol 40 3 times daily - > transition to oral prednisone taper today. · S/p 3 days azithromycin given sputum production  · s/p Xopenex/Atrovent nebulizers  · Continue home albuterol/Atrovent/Symbicort inhaler    -Reports feeling significantly better and at baseline. Will DC home on prednisone taper along with home inhalers. Outpatient follow-up with patient's pulmonologist in 2 days. Tobacco abuse  Assessment & Plan  · Nicotine patch ordered  · Smoking cessation counseling done. Hypertension  Assessment & Plan  · Blood pressure adequately controlled  · Not on any chronic antihypertensive medications  · Continue to monitor. Discharging Physician / Practitioner: Candance Petrin, MD  PCP: Adela Busby MD  Admission Date:   Admission Orders (From admission, onward)     Ordered        07/24/23 2[address]  Once                      Discharge Date: 07/26/23    Disposition:      · home    Reason for Admission: shortness of breath    Discharge Diagnoses:     Please see assessment and plan section above for further details regarding discharge diagnoses.      Medical Problems     Resolved Problems  Date Reviewed: 3/3/2020   None         Medication Adjustments and Discharge Medications:  · Summary of Medication Adjustments made as a result of this hospitalization: see med rec  · Medication Dosing Tapers - Please refer to Discharge Medication List for details on any medication dosing tapers (if applicable to patient). · Medications being temporarily held (include recommended restart time): see med rec  · Discharge Medication List: See after visit summary for reconciled discharge medications. Diet Recommendations at Discharge:  Diet -        Diet Orders   (From admission, onward)             Start     Ordered    07/25/23 1340  Dietary nutrition supplements  Once        Question Answer Comment   Select Supplement: Andreina Granado    Frequency Dinner        07/25/23 1339    07/24/23 1610  Diet Regular; Regular House  Diet effective now        References:    Adult Nutrition Support Algorithm    RD Therapeutic Diet Order Protocol   Question Answer Comment   Diet Type Regular    Regular Regular House    RD to adjust diet per protocol? Yes        07/24/23 1609                  Hospital Course:     Janay Hunter is a 76 y.o. female patient who originally presented to the hospital on 7/24/2023 with known history of chronic hypoxic respiratory failure secondary to asthma/COPD presenting with worsening shortness of breath/dry cough/wheezing for 3 days not improving despite taking home oral prednisone and nebulizers. On arrival in the ED, chest x-ray negative for acute cardiopulmonary disease. Admitted and started on IV Solu-Medrol 40 mg 3 times a day along with scheduled Xopenex/Atrovent nebulizers. Patient also received 3-day course of azithromycin. Patient symptomatically feels much improved. She is on her 3 L which is her baseline. Shortness of breath/cough improved. Wheezing improved although still present. Being transitioned to oral prednisone taper today.   She is being discharged and instructed to follow-up with her pulmonologist in 2 days which she plans to schedule. Verbally states understanding of plan above and is in agreement. DC home. Condition at Discharge: fair     Discharge Day Visit / Exam:     Vitals: Blood Pressure: 154/82 (07/26/23 0735)  Pulse: (!) 106 (07/26/23 0735)  Temperature: 98 °F (36.7 °C) (07/26/23 0735)  Temp Source: Oral (07/26/23 0735)  Respirations: 18 (07/26/23 0735)  Height: 5' (152.4 cm) (07/24/23 1629)  Weight - Scale: 60.3 kg (133 lb) (07/24/23 1629)  SpO2: 94 % (07/26/23 0804)  Exam:   Physical Exam  Constitutional:       General: She is not in acute distress. Appearance: She is normal weight. She is not toxic-appearing. HENT:      Mouth/Throat:      Mouth: Mucous membranes are moist.      Pharynx: Oropharynx is clear. Cardiovascular:      Rate and Rhythm: Normal rate and regular rhythm. Pulses: Normal pulses. Pulmonary:      Effort: Pulmonary effort is normal. No respiratory distress. Comments: occ scattered expiratory wheeze heard  Abdominal:      General: Abdomen is flat. Bowel sounds are normal.      Palpations: Abdomen is soft. Musculoskeletal:      Right lower leg: No edema. Left lower leg: No edema. Neurological:      General: No focal deficit present. Mental Status: She is alert and oriented to person, place, and time. Goals of Care Discussions:  · Code Status at Discharge: Level 1 - Full Code    Discharge instructions/Information to patient and family:   See after visit summary section titled Discharge Instructions for information provided to patient and family. Discharge Statement:  I spent 40 minutes discharging the patient. This time was spent on the day of discharge. I had direct contact with the patient on the day of discharge.  Greater than 50% of the total time was spent examining patient, answering all patient questions, arranging and discussing plan of care with patient as well as directly providing post-discharge instructions. Additional time then spent on discharge activities.     ** Please Note: This note has been constructed using a voice recognition system **

## 2023-12-04 ENCOUNTER — HOSPITAL ENCOUNTER (EMERGENCY)
Facility: HOSPITAL | Age: 68
Discharge: HOME | End: 2023-12-04
Payer: MEDICARE

## 2023-12-04 ENCOUNTER — APPOINTMENT (EMERGENCY)
Dept: RADIOLOGY | Facility: HOSPITAL | Age: 68
End: 2023-12-04
Payer: MEDICARE

## 2023-12-04 VITALS
OXYGEN SATURATION: 95 % | SYSTOLIC BLOOD PRESSURE: 160 MMHG | RESPIRATION RATE: 22 BRPM | HEART RATE: 84 BPM | TEMPERATURE: 97.6 F | DIASTOLIC BLOOD PRESSURE: 69 MMHG

## 2023-12-04 DIAGNOSIS — S69.90XA FINGER INJURY, INITIAL ENCOUNTER: Primary | ICD-10-CM

## 2023-12-04 PROCEDURE — 73130 X-RAY EXAM OF HAND: CPT

## 2023-12-04 PROCEDURE — 99283 EMERGENCY DEPT VISIT LOW MDM: CPT

## 2023-12-04 PROCEDURE — 99283 EMERGENCY DEPT VISIT LOW MDM: CPT | Performed by: PHYSICIAN ASSISTANT

## 2023-12-04 NOTE — ED PROVIDER NOTES
History  Chief Complaint   Patient presents with    Finger Injury     Pt injured 2 fingers on her left hand when they were caught between chairs     Lenore Vasquez is a right-hand-dominant 66-year-old female arriving ambulatory to the emergency department for evaluation of pain to the left fourth and fifth digits secondary to recent injury. Patient reports that she was braiding her daughter's hair while seated on a chair yesterday and her left hand got caught between a chair. She notes that this was very brief and there was no sustained crush injury, though she has continued with pain along the fourth/fifth digits of the hand with mild swelling. She denies pain or injury elsewhere and offers no other complaints or concerns at this time. Prior to Admission Medications   Prescriptions Last Dose Informant Patient Reported? Taking? ATROVENT HFA 17 MCG/ACT inhaler  Self Yes No   Si puffs 4 (four) times a day   PROVENTIL  (90 Base) MCG/ACT inhaler  Self Yes No   SYMBICORT 160-4.5 MCG/ACT inhaler  Self Yes No   alendronate (FOSAMAX) 70 mg tablet  Self Yes No   Sig: Take 70 mg by mouth Once a week   ergocalciferol (VITAMIN D2) 50,000 units  Self Yes No   Sig: Take 50,000 Units by mouth once a week   esomeprazole (NexIUM) 20 mg capsule   No No   Sig: Take 1 capsule (20 mg total) by mouth 2 (two) times a day before meals   levocetirizine (XYZAL) 5 MG tablet  Self Yes No   Sig: levocetirizine 5 mg tablet   montelukast (SINGULAIR) 10 mg tablet  Self Yes No   Sig: montelukast 10 mg tablet   nicotine (NICODERM CQ) 14 mg/24hr TD 24 hr patch   No No   Sig: Place 1 patch on the skin over 24 hours daily   predniSONE 10 mg tablet   No No   Sig: Take 1 tablet (10 mg total) by mouth 2 (two) times a day Do not start before 2023.    promethazine-dextromethorphan (PHENERGAN-DM) 6.25-15 mg/5 mL oral syrup  Self Yes No   simvastatin (ZOCOR) 20 mg tablet  Self Yes No   Sig: Take 20 mg by mouth daily with dinner temazepam (RESTORIL) 30 mg capsule  Self Yes No   Sig: take 1 capsule by mouth nightly if needed for sleep      Facility-Administered Medications: None       Past Medical History:   Diagnosis Date    Asthma     Colon polyp     COPD (chronic obstructive pulmonary disease) ([facility])     Diabetes mellitus ([facility])     Hyperlipidemia        Past Surgical History:   Procedure Laterality Date    COLONOSCOPY      HERNIA REPAIR      MO EXCISION GANGLION WRIST DORSAL/VOLAR PRIMARY Left 01/10/2020    Procedure: DORSAL WRIST GANGLION CYST EXCISION; DISTAL RADIUS CYST EXCISION;  Surgeon: Nhung Lai MD;  Location: [facility];  Service: Orthopedics    MO INCISION EXTENSOR TENDON SHEATH WRIST Left 01/10/2020    Procedure: John Raojoan;  Surgeon: Nhung Lai MD;  Location: [facility];  Service: Orthopedics       History reviewed. No pertinent family history. I have reviewed and agree with the history as documented. E-Cigarette/Vaping    E-Cigarette Use Never User      E-Cigarette/Vaping Substances    Nicotine No     THC No     CBD No     Flavoring No     Other No     Unknown No      Social History     Tobacco Use    Smoking status: Some Days     Types: Cigarettes     Last attempt to quit:      Years since quittin.9    Smokeless tobacco: Never    Tobacco comments:     One pack per one month and a half   Vaping Use    Vaping Use: Never used   Substance Use Topics    Alcohol use: Never    Drug use: Never       Review of Systems   Musculoskeletal:  Positive for arthralgias and myalgias. Neurological:  Negative for weakness and numbness. All other systems reviewed and are negative. Physical Exam  Physical Exam  Vitals and nursing note reviewed. Constitutional:       General: She is not in acute distress. Appearance: Normal appearance. She is not ill-appearing, toxic-appearing or diaphoretic. HENT:      Head: Normocephalic and atraumatic.       Right Ear: External ear normal.      Left Ear: External ear normal.   Eyes:      Conjunctiva/sclera: Conjunctivae normal.   Cardiovascular:      Rate and Rhythm: Normal rate. Pulmonary:      Effort: Pulmonary effort is normal.   Musculoskeletal:      Cervical back: Normal range of motion and neck supple. Comments: No obvious deformity on inspection of the left hand and wrist.  There is tenderness on palpation to the fourth/fifth digits with slightly decreased range of motion secondary to pain. No snuffbox tenderness to palpation. Radial pulse 2+, distal sensation intact, normal capillary refill. Skin:     General: Skin is warm and dry. Capillary Refill: Capillary refill takes less than 2 seconds. Neurological:      Mental Status: She is alert. Mental status is at baseline. GCS: GCS eye subscore is 4. GCS verbal subscore is 5. GCS motor subscore is 6. Vital Signs  ED Triage Vitals [12/04/23 1219]   Temperature Pulse Respirations Blood Pressure SpO2   97.6 °F (36.4 °C) 84 22 160/69 95 %      Temp Source Heart Rate Source Patient Position - Orthostatic VS BP Location FiO2 (%)   Temporal Monitor Sitting Right arm --      Pain Score       --           Vitals:    12/04/23 1219   BP: 160/69   Pulse: 84   Patient Position - Orthostatic VS: Sitting         Visual Acuity      ED Medications  Medications - No data to display    Diagnostic Studies  Results Reviewed       None                   XR hand 3+ views LEFT   Final Result by Malina Rodas MD (12/04 1383)      There is a well-corticated osseous projection off of the base of the second metacarpal. Favor old injury versus degenerative change. However recommend clinical correlation to rule out acute fracture at this location. I have personally reviewed this study including all images.  / R.J.F. If symptoms persist, a follow up exam is suggested in 7-14 days.                Resident: Jammie Alonzo, the attending radiologist, have reviewed the images and agree with the final report above.      Workstation performed: [id number]                    Procedures  Procedures         ED Course                               SBIRT 22yo+      Flowsheet Row Most Recent Value   Initial Alcohol Screen: US AUDIT-C     1. How often do you have a drink containing alcohol? 0 Filed at: 12/04/2023 1221   2. How many drinks containing alcohol do you have on a typical day you are drinking? 0 Filed at: 12/04/2023 1221   3b. FEMALE Any Age, or MALE 65+: How often do you have 4 or more drinks on one occassion? 0 Filed at: 12/04/2023 1221   Audit-C Score 0 Filed at: 12/04/2023 1221   ALEJANDRO: How many times in the past year have you. .. Used an illegal drug or used a prescription medication for non-medical reasons? Never Filed at: 12/04/2023 1221                      Medical Decision Making  MDM: Right-hand-dominant 80-year-old female presenting for evaluation with complaint of pain to the left fourth and fifth digits due to recent injury yesterday. She states that she was braiding her daughter's hair at the time and her hand was briefly caught between a chair. Her pain is localized to the fourth and fifth digits, worsened by movement of the digits. She denies any extremity paresthesias or weakness or pain in the left wrist.  Her vital signs are stable on presentation with examination as above. The left upper extremity is neurovascularly intact and there is no obvious ecchymosis, edema, or signs of injury. X-ray of the left hand was obtained. Official radiology report is notable for a well-corticated osseous projection off the base of the second metacarpal which favors an old injury, and the patient has no tenderness upon palpation at this location to call this an acute fracture at this time. Nikolai tape applied to the 4th/5th digits and wrist brace given. Discussed supportive measures to include rest, ice, nikolai taping for compression and immobilization, and elevation when able.   Oral Tylenol and ibuprofen as needed for pain control. Outpatient Ortho/hand follow-up for further management which was included in the patient's discharge paperwork. Parameters for ED return discussed at length. Patient verbalized understanding of plan as above which he is comfortable and agreeable with. She was discharged in stable condition and ambulated independently from the emergency department today without issue. Amount and/or Complexity of Data Reviewed  Radiology: ordered. Disposition  Final diagnoses:   Finger injury, initial encounter - Left 4th/5th digits     Time reflects when diagnosis was documented in both MDM as applicable and the Disposition within this note       Time User Action Codes Description Comment    12/4/2023  1:51 PM Jeanne Caller Add [S67.10XA] Crush injury to finger, initial encounter     12/4/2023  1:51 PM Jeanne Caller Modify [S67.10XA] Crush injury to finger, initial encounter left 4th/5th digits    12/4/2023  1:52 PM Jeanne Caller Add [S69.90XA] Finger injury, initial encounter     12/4/2023  1:52 PM Jeanne Caller Modify [S69.90XA] Finger injury, initial encounter     12/4/2023  1:52 PM Jeanne Caller Remove [S67.10XA] Crush injury to finger, initial encounter left 4th/5th digits    12/4/2023  1:52 PM Jeanne Caller Modify [S69.90XA] Finger injury, initial encounter Left 4th/5th digits          ED Disposition       ED Disposition   Discharge    Condition   Stable    Date/Time   Mon Dec 4, 2023  1:50 PM    Comment   Serina Klein discharge to home/self care.                    Follow-up Information       Follow up With Specialties Details Why Contact Info Additional Information    Christy Hendrickson MD    [address] [facility],[address] [phone]       Carson Morse MD Orthopedic Surgery, Hand Surgery   [address]        [facility] [address] Emergency Department Emergency Medicine  If symptoms worsen [address] [facility] [id number] [address] [facility] Emergency Department, Viktor Flowers, [address], [address]            Patient's Medications   Discharge Prescriptions    No medications on file       No discharge procedures on file.     PDMP Review         Value Time User    PDMP Reviewed  Yes 11/21/2019 11:49 AM Melida Favre, PA-C            ED Provider  Electronically Signed by             Jay Ramos PA-C  12/09/23 [id number]

## 2023-12-04 NOTE — DISCHARGE INSTRUCTIONS
Rest, icing, nikolai tape, elevation, Tylenol ibuprofen for pain control. Outpatient Ortho/hand follow-up if symptoms persist.  Return to the ED with any new or worsening symptoms as discussed.

## 2024-01-19 ENCOUNTER — TELEPHONE (OUTPATIENT)
Dept: GASTROENTEROLOGY | Facility: CLINIC | Age: 69
End: 2024-01-19

## 2024-01-19 NOTE — TELEPHONE ENCOUNTER
Can we schedule an appt for Medication Refill Review please.    Last consult 2/15/2023 with Dr. Joyner    Thank you!

## 2024-03-07 RX ORDER — AZELASTINE 1 MG/ML
SPRAY, METERED NASAL
COMMUNITY

## 2024-03-07 RX ORDER — FLUTICASONE PROPIONATE 50 MCG
1 SPRAY, SUSPENSION (ML) NASAL 2 TIMES DAILY
COMMUNITY
Start: 2024-01-04

## 2024-03-07 RX ORDER — OMEPRAZOLE 20 MG/1
CAPSULE, DELAYED RELEASE ORAL
COMMUNITY

## 2024-03-07 RX ORDER — NYSTATIN AND TRIAMCINOLONE ACETONIDE 100000; 1 [USP'U]/G; MG/G
1 OINTMENT TOPICAL 2 TIMES DAILY
COMMUNITY
Start: 2023-11-01 | End: 2024-10-31

## 2024-03-07 RX ORDER — LORATADINE 10 MG/1
10 TABLET ORAL DAILY
COMMUNITY
Start: 2023-07-03 | End: 2024-07-02

## 2024-03-07 RX ORDER — LEVALBUTEROL TARTRATE 45 UG/1
AEROSOL, METERED ORAL
COMMUNITY
Start: 2024-01-17

## 2024-03-07 RX ORDER — PANTOPRAZOLE SODIUM 40 MG/1
TABLET, DELAYED RELEASE ORAL
COMMUNITY
Start: 2024-01-17

## 2024-03-07 RX ORDER — DULAGLUTIDE 1.5 MG/.5ML
INJECTION, SOLUTION SUBCUTANEOUS
COMMUNITY
Start: 2024-01-03

## 2024-03-07 RX ORDER — INSULIN ASPART 100 [IU]/ML
INJECTION, SUSPENSION SUBCUTANEOUS
COMMUNITY

## 2024-03-07 RX ORDER — LEVALBUTEROL INHALATION SOLUTION 0.63 MG/3ML
3 SOLUTION RESPIRATORY (INHALATION) EVERY 4 HOURS PRN
COMMUNITY
Start: 2023-07-03 | End: 2024-07-02

## 2024-03-07 RX ORDER — GLIMEPIRIDE 1 MG/1
1 TABLET ORAL
COMMUNITY
Start: 2023-11-17 | End: 2024-11-16

## 2024-03-08 ENCOUNTER — OFFICE VISIT (OUTPATIENT)
Dept: GASTROENTEROLOGY | Facility: CLINIC | Age: 69
End: 2024-03-08
Payer: MEDICARE

## 2024-03-08 VITALS
SYSTOLIC BLOOD PRESSURE: 148 MMHG | TEMPERATURE: 97.8 F | OXYGEN SATURATION: 95 % | WEIGHT: 139 LBS | HEART RATE: 94 BPM | BODY MASS INDEX: 27.29 KG/M2 | DIASTOLIC BLOOD PRESSURE: 84 MMHG | HEIGHT: 60 IN

## 2024-03-08 DIAGNOSIS — K62.5 RECTAL BLEED: ICD-10-CM

## 2024-03-08 DIAGNOSIS — K21.9 GASTROESOPHAGEAL REFLUX DISEASE WITHOUT ESOPHAGITIS: Primary | ICD-10-CM

## 2024-03-08 PROCEDURE — 99214 OFFICE O/P EST MOD 30 MIN: CPT | Performed by: INTERNAL MEDICINE

## 2024-03-08 RX ORDER — EPINEPHRINE 0.3 MG/.3ML
INJECTION SUBCUTANEOUS
COMMUNITY
Start: 2024-03-01

## 2024-03-08 RX ORDER — HYDROCORTISONE 25 MG/G
CREAM TOPICAL 2 TIMES DAILY
Qty: 100 G | Refills: 3 | Status: SHIPPED | OUTPATIENT
Start: 2024-03-08

## 2024-03-08 RX ORDER — HYDROCORTISONE ACETATE 25 MG/1
25 SUPPOSITORY RECTAL 2 TIMES DAILY
Qty: 12 SUPPOSITORY | Refills: 0 | Status: SHIPPED | OUTPATIENT
Start: 2024-03-08

## 2024-03-08 NOTE — PATIENT INSTRUCTIONS
Procedure: Flexible Sigmoidoscopy  Scheduled date of procedure (as of today):4/4/24  Physician performing procedure:Ar  Location of procedure:[place]  Instructions reviewed with patient by: Mamadou bautista  Clearances:  none

## 2024-03-08 NOTE — PROGRESS NOTES
[facility] - Outpatient Follow-up Note  Rebekah Lamb 68 y.o. female MRN: [id number]  Encounter: [id number]          ASSESSMENT AND PLAN:      1. Gastroesophageal reflux disease without esophagitis  - esomeprazole (NexIUM) 20 mg capsule; Take 1 capsule (20 mg total) by mouth 2 (two) times a day before meals  Dispense: 62 capsule; Refill: 11    2. Rectal bleed  - Flexible Sigmoidoscopy; Future  - hydrocortisone (ANUSOL-HC) 2.5 % rectal cream; Apply topically 2 (two) times a day  Dispense: 100 g; Refill: 3  - hydrocortisone (ANUSOL-HC) 25 mg suppository; Insert 1 suppository (25 mg total) into the rectum 2 (two) times a day  Dispense: 12 suppository; Refill: 0    ______________________________________________________________________    SUBJECTIVE: She states that she is still experiencing bleeding which occurs 1-2 times per month.  It will last for a day or 2 and when she is bleeding she states that is also painful when she wipes.  She had colonoscopy performed on May 9, 2023.  Polyps were found in the descending colon, rectum and in addition to diverticulosis coli of the sigmoid colon.  She is also experiencing recurrent heartburn symptoms but she states that she ran out of esomeprazole 1 month ago.  She was taking it twice a day.  She tried to take the esomeprazole once a day but this did not control her symptoms.  She is asking for refill of esomeprazole.          REVIEW OF SYSTEMS IS OTHERWISE NEGATIVE.      Historical Information   Past Medical History:   Diagnosis Date    Asthma     Colon polyp     COPD (chronic obstructive pulmonary disease) (HCC)     Diabetes mellitus (HCC)     Hyperlipidemia      Past Surgical History:   Procedure Laterality Date    COLONOSCOPY      HERNIA REPAIR      AK EXCISION GANGLION WRIST DORSAL/VOLAR PRIMARY Left 01/10/2020    Procedure: DORSAL WRIST GANGLION CYST EXCISION; DISTAL RADIUS CYST EXCISION;  Surgeon: Bin Aguiar MD;  Location: [facility];   Service: Orthopedics    LA INCISION EXTENSOR TENDON SHEATH WRIST Left 01/10/2020    Procedure: DEQUERVAINS RELEASE;  Surgeon: Bin Aguiar MD;  Location: [facility];  Service: Orthopedics     Social History   Social History     Substance and Sexual Activity   Alcohol Use Never     Social History     Substance and Sexual Activity   Drug Use Never     Social History     Tobacco Use   Smoking Status Some Days    Current packs/day: 0.00    Types: Cigarettes    Last attempt to quit: 2015    Years since quittin.1   Smokeless Tobacco Never   Tobacco Comments    One pack per one month and a half     History reviewed. No pertinent family history.    Meds/Allergies       Current Outpatient Medications:     Alcohol Swabs (Alcohol Wipes) 70 % PADS    alendronate (FOSAMAX) 70 mg tablet    ATROVENT HFA 17 MCG/ACT inhaler    azelastine (ASTELIN) 0.1 % nasal spray    EPINEPHrine (EPIPEN) 0.3 mg/0.3 mL SOAJ    ergocalciferol (VITAMIN D2) 50,000 units    esomeprazole (NexIUM) 20 mg capsule    esomeprazole (NexIUM) 20 mg capsule    fluticasone (FLONASE) 50 mcg/act nasal spray    glimepiride (AMARYL) 1 mg tablet    hydrocortisone (ANUSOL-HC) 2.5 % rectal cream    hydrocortisone (ANUSOL-HC) 25 mg suppository    insulin aspart protamine-insulin aspart (NovoLOG Mix 70/30) 100 units/mL injection    levalbuterol (XOPENEX HFA) 45 mcg/act inhaler    levalbuterol (XOPENEX) 0.63 mg/3 mL nebulizer solution    levocetirizine (XYZAL) 5 MG tablet    loratadine (CLARITIN) 10 mg tablet    montelukast (SINGULAIR) 10 mg tablet    nicotine (NICODERM CQ) 14 mg/24hr TD 24 hr patch    nystatin-triamcinolone (MYCOLOG-II) ointment    omeprazole (PriLOSEC) 20 mg delayed release capsule    Omeprazole Magnesium (PRILOSEC PO)    pantoprazole (PROTONIX) 40 mg tablet    predniSONE 10 mg tablet    promethazine-dextromethorphan (PHENERGAN-DM) 6.25-15 mg/5 mL oral syrup    PROVENTIL  (90 Base) MCG/ACT inhaler    simvastatin (ZOCOR) 20 mg tablet     SYMBICORT 160-4.5 MCG/ACT inhaler    temazepam (RESTORIL) 30 mg capsule    Trulicity 1.5 MG/0.5ML injection    Allergies   Allergen Reactions    Cephalexin Hives    Clindamycin Hives    Metformin Other (See Comments)    Sulfamethoxazole-Trimethoprim Hives           Objective     Blood pressure 148/84, pulse 94, temperature 97.8 °F (36.6 °C), height 5' (1.524 m), weight 63 kg (139 lb), SpO2 95%. Body mass index is 27.15 kg/m².      PHYSICAL EXAM:      General Appearance:   Alert, cooperative, no distress   HEENT:   Normocephalic, atraumatic, anicteric.     Neck:  Supple, symmetrical, trachea midline   Lungs:   Clear to auscultation bilaterally; no rales, rhonchi or wheezing; respirations unlabored    Heart::   Regular rate and rhythm; no murmur, rub, or gallop.   Abdomen:   Soft, non-tender, non-distended; normal bowel sounds; no masses, no organomegaly    Genitalia:   Deferred    Rectal:   Deferred    Extremities:  No cyanosis, clubbing or edema    Pulses:  2+ and symmetric    Skin:  No jaundice, rashes, or lesions    Lymph nodes:  No palpable cervical lymphadenopathy        Lab Results:   No visits with results within 1 Day(s) from this visit.   Latest known visit with results is:   Admission on 07/24/2023, Discharged on 07/26/2023   Component Date Value    Sodium 07/24/2023 139     Potassium 07/24/2023 3.3 (L)     Chloride 07/24/2023 105     CO2 07/24/2023 27     ANION GAP 07/24/2023 7     BUN 07/24/2023 18     Creatinine 07/24/2023 0.73     Glucose 07/24/2023 91     Calcium 07/24/2023 9.8     AST 07/24/2023 17     ALT 07/24/2023 22     Alkaline Phosphatase 07/24/2023 66     Total Protein 07/24/2023 7.4     Albumin 07/24/2023 4.5     Total Bilirubin 07/24/2023 0.68     eGFR 07/24/2023 84     WBC 07/24/2023 10.67 (H)     RBC 07/24/2023 4.88     Hemoglobin 07/24/2023 14.2     Hematocrit 07/24/2023 43.5     MCV 07/24/2023 89     MCH 07/24/2023 29.1     MCHC 07/24/2023 32.6     RDW 07/24/2023 14.0     MPV 07/24/2023  9.9     Platelets 07/24/2023 287     nRBC 07/24/2023 0     Neutrophils Relative 07/24/2023 72     Immat GRANS % 07/24/2023 0     Lymphocytes Relative 07/24/2023 19     Monocytes Relative 07/24/2023 9     Eosinophils Relative 07/24/2023 0     Basophils Relative 07/24/2023 0     Neutrophils Absolute 07/24/2023 7.62     Immature Grans Absolute 07/24/2023 0.03     Lymphocytes Absolute 07/24/2023 2.01     Monocytes Absolute 07/24/2023 0.96     Eosinophils Absolute 07/24/2023 0.02     Basophils Absolute 07/24/2023 0.03     hs TnI 0hr 07/24/2023 3     Ventricular Rate 07/24/2023 76     Atrial Rate 07/24/2023 76     AR Interval 07/24/2023 124     QRSD Interval 07/24/2023 76     QT Interval 07/24/2023 384     QTC Interval 07/24/2023 432     P Axis 07/24/2023 -22     QRS Axis 07/24/2023 62     T Wave [place] 07/24/2023 39     Ventricular Rate 07/24/2023 80     Atrial Rate 07/24/2023 80     AR Interval 07/24/2023 132     QRSD Interval 07/24/2023 84     QT Interval 07/24/2023 388     QTC Interval 07/24/2023 447     P Axis 07/24/2023 133     QRS Axis 07/24/2023 -15     T Wave Axis 07/24/2023 -8     hs TnI 2hr 07/24/2023 3     Delta 2hr hsTnI 07/24/2023 0     Sodium 07/25/2023 139     Potassium 07/25/2023 4.3     Chloride 07/25/2023 106     CO2 07/25/2023 27     ANION GAP 07/25/2023 6     BUN 07/25/2023 16     Creatinine 07/25/2023 0.60     Glucose 07/25/2023 161 (H)     Calcium 07/25/2023 9.5     eGFR 07/25/2023 93     WBC 07/25/2023 10.00     RBC 07/25/2023 4.97     Hemoglobin 07/25/2023 14.5     Hematocrit 07/25/2023 44.9     MCV 07/25/2023 90     MCH 07/25/2023 29.2     MCHC 07/25/2023 32.3     RDW 07/25/2023 13.6     MPV 07/25/2023 9.7     Platelets 07/25/2023 307     nRBC 07/25/2023 0     Neutrophils Relative 07/25/2023 91 (H)     Immat GRANS % 07/25/2023 1     Lymphocytes Relative 07/25/2023 6 (L)     Monocytes Relative 07/25/2023 2 (L)     Eosinophils Relative 07/25/2023 0     Basophils Relative 07/25/2023 0     Neutrophils  Absolute 07/25/2023 9.12 (H)     Immature Grans Absolute 07/25/2023 0.07     Lymphocytes Absolute 07/25/2023 0.60     Monocytes Absolute 07/25/2023 0.20     Eosinophils Absolute 07/25/2023 0.00     Basophils Absolute 07/25/2023 0.01     POC Glucose 07/25/2023 214 (H)     POC Glucose 07/25/2023 199 (H)     POC Glucose 07/25/2023 196 (H)     POC Glucose 07/25/2023 228 (H)     WBC 07/26/2023 12.87 (H)     RBC 07/26/2023 4.59     Hemoglobin 07/26/2023 13.4     Hematocrit 07/26/2023 41.3     MCV 07/26/2023 90     MCH 07/26/2023 29.2     MCHC 07/26/2023 32.4     RDW 07/26/2023 13.4     MPV 07/26/2023 10.3     Platelets 07/26/2023 330     nRBC 07/26/2023 0     Neutrophils Relative 07/26/2023 87 (H)     Immat GRANS % 07/26/2023 1     Lymphocytes Relative 07/26/2023 7 (L)     Monocytes Relative 07/26/2023 5     Eosinophils Relative 07/26/2023 0     Basophils Relative 07/26/2023 0     Neutrophils Absolute 07/26/2023 11.13 (H)     Immature Grans Absolute 07/26/2023 0.13     Lymphocytes Absolute 07/26/2023 0.91     Monocytes Absolute 07/26/2023 0.68     Eosinophils Absolute 07/26/2023 0.00     Basophils Absolute 07/26/2023 0.02     Sodium 07/26/2023 140     Potassium 07/26/2023 4.0     Chloride 07/26/2023 108     CO2 07/26/2023 25     ANION GAP 07/26/2023 7     BUN 07/26/2023 20     Creatinine 07/26/2023 0.65     Glucose 07/26/2023 266 (H)     Calcium 07/26/2023 9.6     eGFR 07/26/2023 91     POC Glucose 07/26/2023 284 (H)     POC Glucose 07/26/2023 314 (H)          Radiology Results:   No results found.

## 2024-04-04 ENCOUNTER — ANESTHESIA EVENT (OUTPATIENT)
Dept: GASTROENTEROLOGY | Facility: HOSPITAL | Age: 69
End: 2024-04-04

## 2024-04-04 ENCOUNTER — TREATMENT (OUTPATIENT)
Dept: GASTROENTEROLOGY | Facility: CLINIC | Age: 69
End: 2024-04-04

## 2024-04-04 ENCOUNTER — ANESTHESIA (OUTPATIENT)
Dept: GASTROENTEROLOGY | Facility: HOSPITAL | Age: 69
End: 2024-04-04

## 2024-04-04 ENCOUNTER — HOSPITAL ENCOUNTER (OUTPATIENT)
Dept: GASTROENTEROLOGY | Facility: HOSPITAL | Age: 69
Setting detail: OUTPATIENT SURGERY
End: 2024-04-04
Attending: INTERNAL MEDICINE
Payer: MEDICARE

## 2024-04-04 VITALS
DIASTOLIC BLOOD PRESSURE: 70 MMHG | HEART RATE: 65 BPM | WEIGHT: 135.36 LBS | HEIGHT: 60 IN | BODY MASS INDEX: 26.58 KG/M2 | RESPIRATION RATE: 20 BRPM | SYSTOLIC BLOOD PRESSURE: 170 MMHG | TEMPERATURE: 97.5 F | OXYGEN SATURATION: 95 %

## 2024-04-04 DIAGNOSIS — K62.5 RECTAL BLEED: ICD-10-CM

## 2024-04-04 DIAGNOSIS — K62.5 RECTAL BLEEDING: Primary | ICD-10-CM

## 2024-04-04 PROCEDURE — 88305 TISSUE EXAM BY PATHOLOGIST: CPT | Performed by: PATHOLOGY

## 2024-04-04 RX ORDER — PROPOFOL 10 MG/ML
INJECTION, EMULSION INTRAVENOUS AS NEEDED
Status: DISCONTINUED | OUTPATIENT
Start: 2024-04-04 | End: 2024-04-04

## 2024-04-04 RX ORDER — LIDOCAINE HYDROCHLORIDE 20 MG/ML
INJECTION, SOLUTION EPIDURAL; INFILTRATION; INTRACAUDAL; PERINEURAL AS NEEDED
Status: DISCONTINUED | OUTPATIENT
Start: 2024-04-04 | End: 2024-04-04

## 2024-04-04 RX ORDER — DOCUSATE SODIUM 100 MG/1
100 CAPSULE, LIQUID FILLED ORAL 2 TIMES DAILY
Qty: 52 CAPSULE | Refills: 3 | Status: SHIPPED | OUTPATIENT
Start: 2024-04-04

## 2024-04-04 RX ORDER — SODIUM CHLORIDE, SODIUM LACTATE, POTASSIUM CHLORIDE, CALCIUM CHLORIDE 600; 310; 30; 20 MG/100ML; MG/100ML; MG/100ML; MG/100ML
INJECTION, SOLUTION INTRAVENOUS CONTINUOUS PRN
Status: DISCONTINUED | OUTPATIENT
Start: 2024-04-04 | End: 2024-04-04

## 2024-04-04 RX ADMIN — PROPOFOL 50 MG: 10 INJECTION, EMULSION INTRAVENOUS at 09:31

## 2024-04-04 RX ADMIN — LIDOCAINE HYDROCHLORIDE 50 MG: 20 INJECTION, SOLUTION EPIDURAL; INFILTRATION; INTRACAUDAL; PERINEURAL at 09:31

## 2024-04-04 RX ADMIN — PROPOFOL 50 MG: 10 INJECTION, EMULSION INTRAVENOUS at 09:41

## 2024-04-04 RX ADMIN — SODIUM CHLORIDE, SODIUM LACTATE, POTASSIUM CHLORIDE, AND CALCIUM CHLORIDE: .6; .31; .03; .02 INJECTION, SOLUTION INTRAVENOUS at 09:29

## 2024-04-04 RX ADMIN — PROPOFOL 50 MG: 10 INJECTION, EMULSION INTRAVENOUS at 09:35

## 2024-04-04 NOTE — DISCHARGE INSTRUCTIONS
Colonoscopy   WHAT YOU NEED TO KNOW:   A colonoscopy is a procedure to examine the inside of your colon (intestine) with a scope. Polyps or tissue growths may have been removed during your colonoscopy. It is normal to feel bloated and to have some abdominal discomfort. You should be passing gas. If you have hemorrhoids or you had polyps removed, you may have a small amount of bleeding.        DISCHARGE INSTRUCTIONS:   Seek care immediately if:   You have sudden, severe abdominal pain.     You have problems swallowing.     You have a large amount of black, sticky bowel movements or blood in your bowel movements.     You have sudden trouble breathing.     You feel weak, lightheaded, or faint or your heart beats faster than normal for you.     Contact your healthcare provider if:   You have a fever and chills.      You have nausea or are vomiting.      Your abdomen is bloated or feels full and hard.     You have abdominal pain.   You have black, sticky bowel movements or blood in your bowel movements.  You have not had a bowel movement for 3 days after your procedure.  You have rash or hives.  You have questions or concerns about your procedure.    Activity:   Do not lift, strain, or run for 24 hours after your procedure.     Rest after your procedure. You have been given medicine to relax you. Do not drive or make important decisions until the day after your procedure. Return to your normal activity as directed.     Relieve gas and discomfort from bloating by lying on your right side with a heating pad on your abdomen. You may need to take short walks to help the gas move out. Eat small meals until bloating is relieved.  Follow up with your healthcare provider as directed: Write down your questions so you remember to ask them during your visits.     If you take a “blood thinner”, please review the specific instructions from your endoscopist about when you should resume it. These can be found in the “Recommendation”  and “Your Medication list” sections of this After Visit Summary.

## 2024-04-04 NOTE — ANESTHESIA POSTPROCEDURE EVALUATION
Post-Op Assessment Note    CV Status:  Stable  Pain Score: 0    Pain management: adequate       Mental Status:  Sleepy   Hydration Status:  Stable   PONV Controlled:  None   Airway Patency:  Patent     Post Op Vitals Reviewed: Yes    No anethesia notable event occurred.    Staff: PAULA               /54 (04/04/24 0951)    Temp 97.5 °F (36.4 °C) (04/04/24 0951)    Pulse 74 (04/04/24 0951)   Resp 12 (04/04/24 0951)    SpO2 98 % (04/04/24 0951)

## 2024-04-04 NOTE — H&P
History and Physical - [facility]  Rebekah Lamb 68 y.o. female MRN: [id number]      HPI: Rebekah Lamb is a 68 y.o. year old female who presents for rectal bleeding      REVIEW OF SYSTEMS: Per the HPI, and otherwise unremarkable.    Historical Information   Past Medical History:   Diagnosis Date    Asthma     Colon polyp     COPD (chronic obstructive pulmonary disease) (HCC)     Diabetes mellitus (HCC)      Past Surgical History:   Procedure Laterality Date    COLONOSCOPY      KS EXCISION GANGLION WRIST DORSAL/VOLAR PRIMARY Left 01/10/2020    Procedure: DORSAL WRIST GANGLION CYST EXCISION; DISTAL RADIUS CYST EXCISION;  Surgeon: Bin Aguiar MD;  Location: [facility];  Service: Orthopedics    KS INCISION EXTENSOR TENDON SHEATH WRIST Left 01/10/2020    Procedure: DEQUERVAINS RELEASE;  Surgeon: Bin Aguiar MD;  Location: [facility];  Service: Orthopedics     Social History   Social History     Substance and Sexual Activity   Alcohol Use Never     Social History     Substance and Sexual Activity   Drug Use Never     Social History     Tobacco Use   Smoking Status Some Days    Current packs/day: 0.00    Types: Cigarettes    Last attempt to quit:     Years since quittin.2   Smokeless Tobacco Never     No family history on file.    Meds/Allergies     (Not in a hospital admission)      Allergies   Allergen Reactions    Cephalexin Hives    Clindamycin Hives    Metformin Other (See Comments)    Sulfamethoxazole-Trimethoprim Hives       Objective     Blood pressure 149/68, pulse 91, temperature 97.5 °F (36.4 °C), temperature source Temporal, resp. rate 15, height 5' (1.524 m), weight 61.4 kg (135 lb 5.8 oz), SpO2 96%.      PHYSICAL EXAM    Gen: NAD  CV: RRR  CHEST: Clear  ABD: soft, NT/ND  EXT: no edema      ASSESSMENT/PLAN:  This is a 68 y.o. year old female here for flexible sigmoidoscopy, and she is stable and optimized for her procedure.

## 2024-04-04 NOTE — ANESTHESIA PREPROCEDURE EVALUATION
Procedure:  FLEXIBLE SIGMOIDOSCOPY    Relevant Problems   CARDIO   (+) Hypertension      NEURO/PSYCH   (+) Anxiety      PULMONARY   (+) Acute exacerbation of chronic obstructive pulmonary disease (HCC)   (+) Chronic respiratory failure (HCC)      Behavioral Health   (+) Tobacco abuse      Orthopedic/Musculoskeletal   (+) De Quervain's tenosynovitis, left        Physical Exam    Airway    Mallampati score: II  TM Distance: >3 FB  Neck ROM: full     Dental       Cardiovascular  Rhythm: regular, Rate: normal, Cardiovascular exam normal    Pulmonary  Pulmonary exam normal Breath sounds clear to auscultation    Other Findings  post-pubertal.      Anesthesia Plan  ASA Score- 3     Anesthesia Type- IV sedation with anesthesia with ASA Monitors.         Additional Monitors:     Airway Plan:            Plan Factors-Exercise tolerance (METS): >4 METS.    Chart reviewed. EKG reviewed. Imaging results reviewed. Existing labs reviewed. Patient summary reviewed.    Patient is not a current smoker.              Induction- intravenous.    Postoperative Plan-     Informed Consent- Anesthetic plan and risks discussed with patient.  I personally reviewed this patient with the CRNA. Discussed and agreed on the Anesthesia Plan with the CRNA..

## 2024-04-08 PROCEDURE — 88305 TISSUE EXAM BY PATHOLOGIST: CPT | Performed by: PATHOLOGY

## 2024-04-09 ENCOUNTER — TELEPHONE (OUTPATIENT)
Dept: GASTROENTEROLOGY | Facility: CLINIC | Age: 69
End: 2024-04-09

## 2024-04-09 NOTE — TELEPHONE ENCOUNTER
Called and LMOM with the polyp result and to do a colonoscopy in 5 years.  Office # provided if any questions.

## 2024-04-09 NOTE — TELEPHONE ENCOUNTER
----- Message from Gonzalez Joyner DO sent at 4/9/2024  7:07 AM EDT -----  Please call the patient with the polyp result.  There were 3 polyps removed from the rectum.  These were serrated colon polyps which are most likely benign.  Her next colonoscopy will be due in 5 years.

## 2024-08-16 ENCOUNTER — HOSPITAL ENCOUNTER (INPATIENT)
Facility: HOSPITAL | Age: 69
LOS: 4 days | Discharge: HOME | DRG: 191 | End: 2024-08-21
Attending: EMERGENCY MEDICINE
Payer: MEDICARE

## 2024-08-16 ENCOUNTER — APPOINTMENT (EMERGENCY)
Dept: RADIOLOGY | Facility: HOSPITAL | Age: 69
DRG: 191 | End: 2024-08-16
Payer: MEDICARE

## 2024-08-16 DIAGNOSIS — K21.9 GASTROESOPHAGEAL REFLUX DISEASE WITHOUT ESOPHAGITIS: ICD-10-CM

## 2024-08-16 DIAGNOSIS — J44.1 COPD EXACERBATION (HCC): Primary | ICD-10-CM

## 2024-08-16 PROBLEM — E78.5 HYPERLIPIDEMIA ASSOCIATED WITH TYPE 2 DIABETES MELLITUS  (HCC): Status: ACTIVE | Noted: 2019-08-02

## 2024-08-16 PROBLEM — K76.0 NAFL (NONALCOHOLIC FATTY LIVER): Status: ACTIVE | Noted: 2021-02-15

## 2024-08-16 PROBLEM — E11.69 HYPERLIPIDEMIA ASSOCIATED WITH TYPE 2 DIABETES MELLITUS  (HCC): Status: ACTIVE | Noted: 2019-08-02

## 2024-08-16 PROBLEM — E55.9 VITAMIN D DEFICIENCY: Status: ACTIVE | Noted: 2019-08-02

## 2024-08-16 LAB
2HR DELTA HS TROPONIN: 0 NG/L
ALBUMIN SERPL BCG-MCNC: 4 G/DL (ref 3.5–5)
ALP SERPL-CCNC: 55 U/L (ref 34–104)
ALT SERPL W P-5'-P-CCNC: 20 U/L (ref 7–52)
ANION GAP SERPL CALCULATED.3IONS-SCNC: 9 MMOL/L (ref 4–13)
AST SERPL W P-5'-P-CCNC: 12 U/L (ref 13–39)
BASOPHILS # BLD AUTO: 0.01 THOUSANDS/ÂΜL (ref 0–0.1)
BASOPHILS NFR BLD AUTO: 0 % (ref 0–1)
BILIRUB SERPL-MCNC: 0.44 MG/DL (ref 0.2–1)
BNP SERPL-MCNC: 41 PG/ML (ref 0–100)
BUN SERPL-MCNC: 12 MG/DL (ref 5–25)
CALCIUM SERPL-MCNC: 9.9 MG/DL (ref 8.4–10.2)
CARDIAC TROPONIN I PNL SERPL HS: 6 NG/L
CARDIAC TROPONIN I PNL SERPL HS: 6 NG/L
CHLORIDE SERPL-SCNC: 105 MMOL/L (ref 96–108)
CO2 SERPL-SCNC: 26 MMOL/L (ref 21–32)
CREAT SERPL-MCNC: 0.55 MG/DL (ref 0.6–1.3)
D DIMER PPP FEU-MCNC: 0.55 UG/ML FEU
EOSINOPHIL # BLD AUTO: 0 THOUSAND/ÂΜL (ref 0–0.61)
EOSINOPHIL NFR BLD AUTO: 0 % (ref 0–6)
ERYTHROCYTE [DISTWIDTH] IN BLOOD BY AUTOMATED COUNT: 14.5 % (ref 11.6–15.1)
FLUAV RNA RESP QL NAA+PROBE: NEGATIVE
FLUBV RNA RESP QL NAA+PROBE: NEGATIVE
GFR SERPL CREATININE-BSD FRML MDRD: 96 ML/MIN/1.73SQ M
GLUCOSE SERPL-MCNC: 210 MG/DL (ref 65–140)
HCT VFR BLD AUTO: 41.5 % (ref 34.8–46.1)
HGB BLD-MCNC: 12.9 G/DL (ref 11.5–15.4)
IMM GRANULOCYTES # BLD AUTO: 0.13 THOUSAND/UL (ref 0–0.2)
IMM GRANULOCYTES NFR BLD AUTO: 1 % (ref 0–2)
LYMPHOCYTES # BLD AUTO: 1.05 THOUSANDS/ÂΜL (ref 0.6–4.47)
LYMPHOCYTES NFR BLD AUTO: 10 % (ref 14–44)
MCH RBC QN AUTO: 28.4 PG (ref 26.8–34.3)
MCHC RBC AUTO-ENTMCNC: 31.1 G/DL (ref 31.4–37.4)
MCV RBC AUTO: 91 FL (ref 82–98)
MONOCYTES # BLD AUTO: 0.43 THOUSAND/ÂΜL (ref 0.17–1.22)
MONOCYTES NFR BLD AUTO: 4 % (ref 4–12)
NEUTROPHILS # BLD AUTO: 9.06 THOUSANDS/ÂΜL (ref 1.85–7.62)
NEUTS SEG NFR BLD AUTO: 85 % (ref 43–75)
NRBC BLD AUTO-RTO: 0 /100 WBCS
PLATELET # BLD AUTO: 249 THOUSANDS/UL (ref 149–390)
PMV BLD AUTO: 9.6 FL (ref 8.9–12.7)
POTASSIUM SERPL-SCNC: 4 MMOL/L (ref 3.5–5.3)
PROT SERPL-MCNC: 6.3 G/DL (ref 6.4–8.4)
RBC # BLD AUTO: 4.54 MILLION/UL (ref 3.81–5.12)
RSV RNA RESP QL NAA+PROBE: NEGATIVE
SARS-COV-2 RNA RESP QL NAA+PROBE: NEGATIVE
SODIUM SERPL-SCNC: 140 MMOL/L (ref 135–147)
WBC # BLD AUTO: 10.68 THOUSAND/UL (ref 4.31–10.16)

## 2024-08-16 PROCEDURE — 96365 THER/PROPH/DIAG IV INF INIT: CPT

## 2024-08-16 PROCEDURE — 94664 DEMO&/EVAL PT USE INHALER: CPT

## 2024-08-16 PROCEDURE — 96375 TX/PRO/DX INJ NEW DRUG ADDON: CPT

## 2024-08-16 PROCEDURE — 83036 HEMOGLOBIN GLYCOSYLATED A1C: CPT | Performed by: PHYSICIAN ASSISTANT

## 2024-08-16 PROCEDURE — 85379 FIBRIN DEGRADATION QUANT: CPT | Performed by: EMERGENCY MEDICINE

## 2024-08-16 PROCEDURE — 94644 CONT INHLJ TX 1ST HOUR: CPT

## 2024-08-16 PROCEDURE — 0241U HB NFCT DS VIR RESP RNA 4 TRGT: CPT | Performed by: EMERGENCY MEDICINE

## 2024-08-16 PROCEDURE — 85025 COMPLETE CBC W/AUTO DIFF WBC: CPT | Performed by: EMERGENCY MEDICINE

## 2024-08-16 PROCEDURE — 71046 X-RAY EXAM CHEST 2 VIEWS: CPT

## 2024-08-16 PROCEDURE — 80053 COMPREHEN METABOLIC PANEL: CPT | Performed by: EMERGENCY MEDICINE

## 2024-08-16 PROCEDURE — 96366 THER/PROPH/DIAG IV INF ADDON: CPT

## 2024-08-16 PROCEDURE — 99285 EMERGENCY DEPT VISIT HI MDM: CPT

## 2024-08-16 PROCEDURE — 93005 ELECTROCARDIOGRAM TRACING: CPT

## 2024-08-16 PROCEDURE — 84484 ASSAY OF TROPONIN QUANT: CPT | Performed by: EMERGENCY MEDICINE

## 2024-08-16 PROCEDURE — 83880 ASSAY OF NATRIURETIC PEPTIDE: CPT | Performed by: EMERGENCY MEDICINE

## 2024-08-16 PROCEDURE — 94640 AIRWAY INHALATION TREATMENT: CPT

## 2024-08-16 PROCEDURE — 36415 COLL VENOUS BLD VENIPUNCTURE: CPT | Performed by: EMERGENCY MEDICINE

## 2024-08-16 PROCEDURE — 99291 CRITICAL CARE FIRST HOUR: CPT | Performed by: EMERGENCY MEDICINE

## 2024-08-16 PROCEDURE — 94760 N-INVAS EAR/PLS OXIMETRY 1: CPT

## 2024-08-16 RX ORDER — ALBUTEROL SULFATE 5 MG/ML
10 SOLUTION RESPIRATORY (INHALATION) ONCE
Status: COMPLETED | OUTPATIENT
Start: 2024-08-16 | End: 2024-08-16

## 2024-08-16 RX ORDER — SODIUM CHLORIDE FOR INHALATION 0.9 %
12 VIAL, NEBULIZER (ML) INHALATION ONCE
Status: COMPLETED | OUTPATIENT
Start: 2024-08-16 | End: 2024-08-16

## 2024-08-16 RX ORDER — NITROGLYCERIN 0.4 MG/1
0.4 TABLET SUBLINGUAL ONCE
Status: COMPLETED | OUTPATIENT
Start: 2024-08-16 | End: 2024-08-16

## 2024-08-16 RX ORDER — LEVOFLOXACIN 5 MG/ML
750 INJECTION, SOLUTION INTRAVENOUS ONCE
Status: COMPLETED | OUTPATIENT
Start: 2024-08-16 | End: 2024-08-17

## 2024-08-16 RX ORDER — MAGNESIUM SULFATE HEPTAHYDRATE 40 MG/ML
2 INJECTION, SOLUTION INTRAVENOUS ONCE
Status: COMPLETED | OUTPATIENT
Start: 2024-08-16 | End: 2024-08-16

## 2024-08-16 RX ORDER — METHYLPREDNISOLONE SODIUM SUCCINATE 125 MG/2ML
100 INJECTION, POWDER, LYOPHILIZED, FOR SOLUTION INTRAMUSCULAR; INTRAVENOUS ONCE
Status: COMPLETED | OUTPATIENT
Start: 2024-08-16 | End: 2024-08-16

## 2024-08-16 RX ORDER — HYDRALAZINE HYDROCHLORIDE 20 MG/ML
10 INJECTION INTRAMUSCULAR; INTRAVENOUS ONCE
Status: COMPLETED | OUTPATIENT
Start: 2024-08-16 | End: 2024-08-16

## 2024-08-16 RX ADMIN — MAGNESIUM SULFATE HEPTAHYDRATE 2 G: 40 INJECTION, SOLUTION INTRAVENOUS at 21:28

## 2024-08-16 RX ADMIN — NITROGLYCERIN 0.4 MG: 0.4 TABLET SUBLINGUAL at 23:08

## 2024-08-16 RX ADMIN — METHYLPREDNISOLONE SODIUM SUCCINATE 100 MG: 125 INJECTION, POWDER, FOR SOLUTION INTRAMUSCULAR; INTRAVENOUS at 21:22

## 2024-08-16 RX ADMIN — LEVOFLOXACIN 750 MG: 750 INJECTION, SOLUTION INTRAVENOUS at 23:08

## 2024-08-16 RX ADMIN — IPRATROPIUM BROMIDE 1 MG: 0.5 SOLUTION RESPIRATORY (INHALATION) at 21:21

## 2024-08-16 RX ADMIN — ISODIUM CHLORIDE 12 ML: 0.03 SOLUTION RESPIRATORY (INHALATION) at 21:21

## 2024-08-16 RX ADMIN — ALBUTEROL SULFATE 10 MG: 2.5 SOLUTION RESPIRATORY (INHALATION) at 21:21

## 2024-08-16 RX ADMIN — HYDRALAZINE HYDROCHLORIDE 10 MG: 20 INJECTION INTRAMUSCULAR; INTRAVENOUS at 23:08

## 2024-08-17 ENCOUNTER — APPOINTMENT (OUTPATIENT)
Dept: CT IMAGING | Facility: HOSPITAL | Age: 69
DRG: 191 | End: 2024-08-17
Payer: MEDICARE

## 2024-08-17 LAB
EST. AVERAGE GLUCOSE BLD GHB EST-MCNC: 163 MG/DL
GLUCOSE SERPL-MCNC: 205 MG/DL (ref 65–140)
GLUCOSE SERPL-MCNC: 224 MG/DL (ref 65–140)
GLUCOSE SERPL-MCNC: 324 MG/DL (ref 65–140)
GLUCOSE SERPL-MCNC: 341 MG/DL (ref 65–140)
HBA1C MFR BLD: 7.3 %

## 2024-08-17 PROCEDURE — 99223 1ST HOSP IP/OBS HIGH 75: CPT | Performed by: PHYSICIAN ASSISTANT

## 2024-08-17 PROCEDURE — 94760 N-INVAS EAR/PLS OXIMETRY 1: CPT

## 2024-08-17 PROCEDURE — 94640 AIRWAY INHALATION TREATMENT: CPT

## 2024-08-17 PROCEDURE — 93005 ELECTROCARDIOGRAM TRACING: CPT

## 2024-08-17 PROCEDURE — 71275 CT ANGIOGRAPHY CHEST: CPT

## 2024-08-17 PROCEDURE — 94664 DEMO&/EVAL PT USE INHALER: CPT

## 2024-08-17 PROCEDURE — 82948 REAGENT STRIP/BLOOD GLUCOSE: CPT

## 2024-08-17 PROCEDURE — 99232 SBSQ HOSP IP/OBS MODERATE 35: CPT

## 2024-08-17 RX ORDER — MONTELUKAST SODIUM 10 MG/1
10 TABLET ORAL DAILY
Status: DISCONTINUED | OUTPATIENT
Start: 2024-08-17 | End: 2024-08-21 | Stop reason: HOSPADM

## 2024-08-17 RX ORDER — GUAIFENESIN 600 MG/1
1200 TABLET, EXTENDED RELEASE ORAL EVERY 12 HOURS SCHEDULED
Status: DISCONTINUED | OUTPATIENT
Start: 2024-08-17 | End: 2024-08-21 | Stop reason: HOSPADM

## 2024-08-17 RX ORDER — LABETALOL HYDROCHLORIDE 5 MG/ML
10 INJECTION, SOLUTION INTRAVENOUS EVERY 4 HOURS PRN
Status: DISCONTINUED | OUTPATIENT
Start: 2024-08-17 | End: 2024-08-21 | Stop reason: HOSPADM

## 2024-08-17 RX ORDER — GUAIFENESIN/DEXTROMETHORPHAN 100-10MG/5
10 SYRUP ORAL EVERY 6 HOURS PRN
Status: DISCONTINUED | OUTPATIENT
Start: 2024-08-17 | End: 2024-08-17

## 2024-08-17 RX ORDER — INSULIN LISPRO 100 [IU]/ML
1-5 INJECTION, SOLUTION INTRAVENOUS; SUBCUTANEOUS
Status: DISCONTINUED | OUTPATIENT
Start: 2024-08-17 | End: 2024-08-21 | Stop reason: HOSPADM

## 2024-08-17 RX ORDER — MAGNESIUM HYDROXIDE/ALUMINUM HYDROXICE/SIMETHICONE 120; 1200; 1200 MG/30ML; MG/30ML; MG/30ML
30 SUSPENSION ORAL EVERY 6 HOURS PRN
Status: DISCONTINUED | OUTPATIENT
Start: 2024-08-17 | End: 2024-08-21 | Stop reason: HOSPADM

## 2024-08-17 RX ORDER — METHYLPREDNISOLONE SODIUM SUCCINATE 40 MG/ML
40 INJECTION, POWDER, LYOPHILIZED, FOR SOLUTION INTRAMUSCULAR; INTRAVENOUS EVERY 8 HOURS
Status: DISCONTINUED | OUTPATIENT
Start: 2024-08-17 | End: 2024-08-20

## 2024-08-17 RX ORDER — INSULIN GLARGINE 100 [IU]/ML
5 INJECTION, SOLUTION SUBCUTANEOUS
Status: DISCONTINUED | OUTPATIENT
Start: 2024-08-17 | End: 2024-08-17

## 2024-08-17 RX ORDER — LEVALBUTEROL INHALATION SOLUTION 1.25 MG/3ML
1.25 SOLUTION RESPIRATORY (INHALATION) EVERY 6 HOURS
Status: DISCONTINUED | OUTPATIENT
Start: 2024-08-17 | End: 2024-08-17

## 2024-08-17 RX ORDER — FLUTICASONE PROPIONATE 50 MCG
1 SPRAY, SUSPENSION (ML) NASAL 2 TIMES DAILY
Status: DISCONTINUED | OUTPATIENT
Start: 2024-08-17 | End: 2024-08-21 | Stop reason: HOSPADM

## 2024-08-17 RX ORDER — DOCUSATE SODIUM 100 MG/1
100 CAPSULE, LIQUID FILLED ORAL 2 TIMES DAILY
Status: DISCONTINUED | OUTPATIENT
Start: 2024-08-17 | End: 2024-08-21 | Stop reason: HOSPADM

## 2024-08-17 RX ORDER — ACETAMINOPHEN 325 MG/1
650 TABLET ORAL EVERY 6 HOURS PRN
Status: DISCONTINUED | OUTPATIENT
Start: 2024-08-17 | End: 2024-08-21 | Stop reason: HOSPADM

## 2024-08-17 RX ORDER — INSULIN GLARGINE 100 [IU]/ML
5 INJECTION, SOLUTION SUBCUTANEOUS EVERY MORNING
Status: DISCONTINUED | OUTPATIENT
Start: 2024-08-17 | End: 2024-08-20

## 2024-08-17 RX ORDER — BUDESONIDE AND FORMOTEROL FUMARATE DIHYDRATE 160; 4.5 UG/1; UG/1
2 AEROSOL RESPIRATORY (INHALATION) 2 TIMES DAILY
Status: DISCONTINUED | OUTPATIENT
Start: 2024-08-17 | End: 2024-08-21 | Stop reason: HOSPADM

## 2024-08-17 RX ORDER — PANTOPRAZOLE SODIUM 40 MG/1
40 TABLET, DELAYED RELEASE ORAL DAILY
Status: DISCONTINUED | OUTPATIENT
Start: 2024-08-17 | End: 2024-08-17

## 2024-08-17 RX ORDER — ALBUTEROL SULFATE 90 UG/1
2 AEROSOL, METERED RESPIRATORY (INHALATION) EVERY 4 HOURS PRN
Status: DISCONTINUED | OUTPATIENT
Start: 2024-08-17 | End: 2024-08-19

## 2024-08-17 RX ORDER — AZITHROMYCIN 500 MG/1
500 TABLET, FILM COATED ORAL EVERY 24 HOURS
Status: COMPLETED | OUTPATIENT
Start: 2024-08-17 | End: 2024-08-19

## 2024-08-17 RX ORDER — LEVALBUTEROL INHALATION SOLUTION 1.25 MG/3ML
1.25 SOLUTION RESPIRATORY (INHALATION)
Status: DISCONTINUED | OUTPATIENT
Start: 2024-08-17 | End: 2024-08-21 | Stop reason: HOSPADM

## 2024-08-17 RX ORDER — LOSARTAN POTASSIUM 50 MG/1
50 TABLET ORAL DAILY
Status: DISCONTINUED | OUTPATIENT
Start: 2024-08-17 | End: 2024-08-17

## 2024-08-17 RX ORDER — TEMAZEPAM 15 MG/1
30 CAPSULE ORAL
Status: DISCONTINUED | OUTPATIENT
Start: 2024-08-17 | End: 2024-08-21 | Stop reason: HOSPADM

## 2024-08-17 RX ORDER — NICOTINE 21 MG/24HR
1 PATCH, TRANSDERMAL 24 HOURS TRANSDERMAL DAILY
Status: DISCONTINUED | OUTPATIENT
Start: 2024-08-17 | End: 2024-08-21 | Stop reason: HOSPADM

## 2024-08-17 RX ORDER — ENOXAPARIN SODIUM 100 MG/ML
40 INJECTION SUBCUTANEOUS DAILY
Status: DISCONTINUED | OUTPATIENT
Start: 2024-08-17 | End: 2024-08-21 | Stop reason: HOSPADM

## 2024-08-17 RX ORDER — LOSARTAN POTASSIUM 50 MG/1
50 TABLET ORAL DAILY
COMMUNITY

## 2024-08-17 RX ORDER — LOSARTAN POTASSIUM 50 MG/1
50 TABLET ORAL DAILY
Status: DISCONTINUED | OUTPATIENT
Start: 2024-08-17 | End: 2024-08-21 | Stop reason: HOSPADM

## 2024-08-17 RX ADMIN — IPRATROPIUM BROMIDE 0.5 MG: 0.5 SOLUTION RESPIRATORY (INHALATION) at 13:14

## 2024-08-17 RX ADMIN — BUDESONIDE AND FORMOTEROL FUMARATE DIHYDRATE 2 PUFF: 160; 4.5 AEROSOL RESPIRATORY (INHALATION) at 17:46

## 2024-08-17 RX ADMIN — INSULIN LISPRO 3 UNITS: 100 INJECTION, SOLUTION INTRAVENOUS; SUBCUTANEOUS at 21:31

## 2024-08-17 RX ADMIN — AZITHROMYCIN 500 MG: 500 TABLET, FILM COATED ORAL at 08:10

## 2024-08-17 RX ADMIN — LEVALBUTEROL HYDROCHLORIDE 1.25 MG: 1.25 SOLUTION RESPIRATORY (INHALATION) at 13:14

## 2024-08-17 RX ADMIN — IPRATROPIUM BROMIDE 0.5 MG: 0.5 SOLUTION RESPIRATORY (INHALATION) at 07:20

## 2024-08-17 RX ADMIN — LOSARTAN POTASSIUM 50 MG: 50 TABLET, FILM COATED ORAL at 08:11

## 2024-08-17 RX ADMIN — INSULIN LISPRO 1 UNITS: 100 INJECTION, SOLUTION INTRAVENOUS; SUBCUTANEOUS at 17:40

## 2024-08-17 RX ADMIN — GUAIFENESIN 1200 MG: 600 TABLET ORAL at 21:30

## 2024-08-17 RX ADMIN — ENOXAPARIN SODIUM 40 MG: 40 INJECTION SUBCUTANEOUS at 08:11

## 2024-08-17 RX ADMIN — LEVALBUTEROL HYDROCHLORIDE 1.25 MG: 1.25 SOLUTION RESPIRATORY (INHALATION) at 17:58

## 2024-08-17 RX ADMIN — LEVALBUTEROL HYDROCHLORIDE 1.25 MG: 1.25 SOLUTION RESPIRATORY (INHALATION) at 07:20

## 2024-08-17 RX ADMIN — BUDESONIDE AND FORMOTEROL FUMARATE DIHYDRATE 2 PUFF: 160; 4.5 AEROSOL RESPIRATORY (INHALATION) at 08:12

## 2024-08-17 RX ADMIN — INSULIN LISPRO 3 UNITS: 100 INJECTION, SOLUTION INTRAVENOUS; SUBCUTANEOUS at 10:40

## 2024-08-17 RX ADMIN — ESOMEPRAZOLE MAGNESIUM 20 MG: 20 CAPSULE, DELAYED RELEASE ORAL at 19:05

## 2024-08-17 RX ADMIN — LEVALBUTEROL HYDROCHLORIDE 1.25 MG: 1.25 SOLUTION RESPIRATORY (INHALATION) at 02:14

## 2024-08-17 RX ADMIN — METHYLPREDNISOLONE SODIUM SUCCINATE 40 MG: 40 INJECTION, POWDER, FOR SOLUTION INTRAMUSCULAR; INTRAVENOUS at 21:30

## 2024-08-17 RX ADMIN — METHYLPREDNISOLONE SODIUM SUCCINATE 40 MG: 40 INJECTION, POWDER, FOR SOLUTION INTRAMUSCULAR; INTRAVENOUS at 06:07

## 2024-08-17 RX ADMIN — TEMAZEPAM 30 MG: 15 CAPSULE ORAL at 21:30

## 2024-08-17 RX ADMIN — INSULIN GLARGINE 5 UNITS: 100 INJECTION, SOLUTION SUBCUTANEOUS at 08:10

## 2024-08-17 RX ADMIN — METHYLPREDNISOLONE SODIUM SUCCINATE 40 MG: 40 INJECTION, POWDER, FOR SOLUTION INTRAMUSCULAR; INTRAVENOUS at 13:00

## 2024-08-17 RX ADMIN — IPRATROPIUM BROMIDE 0.5 MG: 0.5 SOLUTION RESPIRATORY (INHALATION) at 17:57

## 2024-08-17 RX ADMIN — INSULIN LISPRO 1 UNITS: 100 INJECTION, SOLUTION INTRAVENOUS; SUBCUTANEOUS at 08:15

## 2024-08-17 RX ADMIN — MONTELUKAST 10 MG: 10 TABLET, FILM COATED ORAL at 08:11

## 2024-08-17 RX ADMIN — IOHEXOL 85 ML: 350 INJECTION, SOLUTION INTRAVENOUS at 08:26

## 2024-08-17 NOTE — ASSESSMENT & PLAN NOTE
Presenting with elevated systolic blood pressure in the 190s, patient reports her baseline is in the 140s to 170s systolic  Will add as needed IV labetalol with parameters  Continue home losartan

## 2024-08-17 NOTE — H&P
"[facility]  H&P  Name: Rebekah Lamb 69 y.o. female I MRN: [id number]  Unit/Bed#: -01 I Date of Admission: 8/16/2024   Date of Service: 8/17/2024 I Hospital Day: 0      Assessment & Plan   * Acute exacerbation of chronic obstructive pulmonary disease (HCC)  Assessment & Plan  Worsening wheezing from baseline and increased shortness of breath today despite using inhalers at home  Improving shortness of breath after nebulizer treatment and IV Solu-Medrol in the ED, but still persistent wheezing  With/flu/RSV negative  Currently saturating in the mid 90s on 3 L, wean down as tolerated, chest x-ray WNL, age-adjusted D-dimer WNL  Respiratory protocol  If patient is still here on Monday consider pulmonology consult  Scheduled Xopenex every 6 hours nebulizer treatment  IV Solu-Medrol 40 mg every 8 hours  Continue home inhalers    Type 2 diabetes mellitus without complication, without long-term current use of insulin ([facility])  Assessment & Plan  Lab Results   Component Value Date    HGBA1C 7.2 (H) 11/01/2023       No results for input(s): \"POCGLU\" in the last 72 hours.    Blood Sugar Average: Last 72 hrs:    Check hemoglobin A1c  Blood glucose checks 4 times daily, hypoglycemia protocol  ssi    Hypertension  Assessment & Plan  Presenting with elevated systolic blood pressure in the 190s, patient reports her baseline is in the 140s to 170s systolic  Will add as needed IV labetalol with parameters  Continue home losartan         VTE Pharmacologic Prophylaxis: VTE Score: 4 Moderate Risk (Score 3-4) - Pharmacological DVT Prophylaxis Ordered: enoxaparin (Lovenox).  Code Status: Level 1 - Full Code per pt  Discussion with family: Updated  () at bedside.    Anticipated Length of Stay: Patient will be admitted on an observation basis with an anticipated length of stay of less than 2 midnights secondary to see above.    Total Time Spent on Date of Encounter in care of patient: 72 mins. " This time was spent on one or more of the following: performing physical exam; counseling and coordination of care; obtaining or reviewing history; documenting in the medical record; reviewing/ordering tests, medications or procedures; communicating with other healthcare professionals and discussing with patient's family/caregivers.    Chief Complaint:    Chief Complaint   Patient presents with    Shortness of Breath     Patient arrives to the ER from home with complaints of shortness of breath x 1 week. Hx of asthma. Pt wears oxygen @ home (3lnc). Pt states she took an epi pen prior to arrival.         History of Present Illness:  Rebekah Lamb is a 69 y.o. female with a PMH of COPD, asthma, diabetes mellitus type 2, nonalcoholic fatty liver disease, tobacco use who presents with complaint of sudden onset worsening shortness of breath and wheezing that started this morning when she woke up.  Patient normally wears 3 L oxygen at home was using her inhalers, but noted she was still having more wheezing than normal and increased shortness of breath.  Denies chest congestion, cough, fevers or recent illness.  Reports after nebulizer treatments and IV Solu-Medrol in the ED her shortness of breath is starting to improve.  Denies chest pain.    Review of Systems:  Review of Systems   Constitutional:  Negative for activity change and fever.   HENT:  Negative for congestion.    Respiratory:  Positive for shortness of breath and wheezing. Negative for cough.    Cardiovascular:  Negative for chest pain.   Gastrointestinal:  Negative for abdominal pain.       Past Medical and Surgical History:   Past Medical History:   Diagnosis Date    Asthma     Colon polyp     COPD (chronic obstructive pulmonary disease) (HCC)     Diabetes mellitus (HCC)        Past Surgical History:   Procedure Laterality Date    COLONOSCOPY      OH EXCISION GANGLION WRIST DORSAL/VOLAR PRIMARY Left 01/10/2020    Procedure: DORSAL WRIST GANGLION CYST  EXCISION; DISTAL RADIUS CYST EXCISION;  Surgeon: Bin Aguiar MD;  Location: [facility];  Service: Orthopedics    NE INCISION EXTENSOR TENDON SHEATH WRIST Left 01/10/2020    Procedure: DEQUERVAINS RELEASE;  Surgeon: Bin Aguiar MD;  Location: [facility];  Service: Orthopedics       Meds/Allergies:  Prior to Admission medications    Medication Sig Start Date End Date Taking? Authorizing Provider   losartan (COZAAR) 50 mg tablet Take 50 mg by mouth daily   Yes Historical Provider, MD   Alcohol Swabs (Alcohol Wipes) 70 % PADS     Historical Provider, MD   alendronate (FOSAMAX) 70 mg tablet Take 70 mg by mouth Once a week 5/2/22 4/4/24  Historical Provider, MD   ATROVENT HFA 17 MCG/ACT inhaler 2 puffs 4 (four) times a day 2/28/20   Historical Provider, MD   azelastine (ASTELIN) 0.1 % nasal spray     Historical Provider, MD   docusate sodium (COLACE) 100 mg capsule Take 1 capsule (100 mg total) by mouth 2 (two) times a day 4/4/24   Gonzalez Joyner DO   EPINEPHrine (EPIPEN) 0.3 mg/0.3 mL SOAJ inject 0.3 milliliter (0.3 MG TOTAL) intramuscularly ONE TIME AS NEEEDED FOR ANAPHYLAXIS 3/1/24   Historical Provider, MD   ergocalciferol (VITAMIN D2) 50,000 units Take 50,000 Units by mouth once a week 1/23/23   Historical Provider, MD   esomeprazole (NexIUM) 20 mg capsule Take 1 capsule (20 mg total) by mouth 2 (two) times a day before meals 3/8/24   Gonzalez Joyner DO   fluticasone (FLONASE) 50 mcg/act nasal spray 1 spray 2 (two) times a day 1/4/24   Historical Provider, MD   hydrocortisone (ANUSOL-HC) 2.5 % rectal cream Apply topically 2 (two) times a day 3/8/24   Gonzalez Joyner DO   hydrocortisone (ANUSOL-HC) 25 mg suppository Insert 1 suppository (25 mg total) into the rectum 2 (two) times a day 3/8/24   Gonzalez Joyner DO   levalbuterol (XOPENEX HFA) 45 mcg/act inhaler  1/17/24   Historical Provider, MD   levocetirizine (XYZAL) 5 MG tablet levocetirizine 5 mg tablet 8/23/18   Historical Provider, MD   montelukast  (SINGULAIR) 10 mg tablet montelukast 10 mg tablet 19   Historical Provider, MD   nicotine (NICODERM CQ) 14 mg/24hr TD 24 hr patch Place 1 patch on the skin over 24 hours daily 23   Valarie Dial MD   nystatin-triamcinolone (MYCOLOG-II) ointment Apply 1 application. topically 2 (two) times a day 11/1/23 10/31/24  Historical Provider, MD   predniSONE 10 mg tablet Take 1 tablet (10 mg total) by mouth 2 (two) times a day Do not start before 2023. 23   Valarie Dial MD   promethazine-dextromethorphan (PHENERGAN-DM) 6.25-15 mg/5 mL oral syrup  22   Historical Provider, MD   PROVENTIL  (90 Base) MCG/ACT inhaler  19   Historical Provider, MD   SYMBICORT 160-4.5 MCG/ACT inhaler  19   Historical Provider, MD   temazepam (RESTORIL) 30 mg capsule take 1 capsule by mouth nightly if needed for sleep 20   Historical Provider, MD   Trulicity 1.5 MG/0.5ML injection Per pt, not taking(backordered) 1/3/24   Historical Provider, MD     I have reviewed her medications per review of chart and PDMP.     Allergies:   Allergies   Allergen Reactions    Cephalexin Hives    Clindamycin Hives    Metformin Other (See Comments)    Sulfamethoxazole-Trimethoprim Hives       Social History:  Marital Status: /Civil Union     Patient Pre-hospital Living Situation: Home  Patient Pre-hospital Level of Mobility: walks  Patient Pre-hospital Diet Restrictions: diabetic  Substance Use History:   Social History     Substance and Sexual Activity   Alcohol Use Never     Social History     Tobacco Use   Smoking Status Some Days    Current packs/day: 0.00    Types: Cigarettes    Last attempt to quit: 2015    Years since quittin.6   Smokeless Tobacco Never     Social History     Substance and Sexual Activity   Drug Use Never       Family History:  History reviewed. No pertinent family history.    Physical Exam:     Vitals:   Blood Pressure: (!) 190/85 (24 2355)  Pulse: (!) 113 (24  2355)  Temperature: 97.8 °F (36.6 °C) (08/16/24 2355)  Temp Source: Oral (08/16/24 2300)  Respirations: 20 (08/16/24 2330)  SpO2: 96 % (08/16/24 2355)    Physical Exam  Vitals and nursing note reviewed.   Constitutional:       General: She is not in acute distress.     Appearance: Normal appearance.   HENT:      Head: Normocephalic.   Cardiovascular:      Rate and Rhythm: Regular rhythm. Tachycardia present.   Pulmonary:      Effort: Pulmonary effort is normal. No accessory muscle usage or respiratory distress.      Breath sounds: No stridor. Wheezing present. No rhonchi or rales.   Abdominal:      General: Abdomen is flat. Bowel sounds are normal.      Palpations: Abdomen is soft.      Tenderness: There is no abdominal tenderness.   Musculoskeletal:      Right lower leg: No edema.      Left lower leg: No edema.   Skin:     General: Skin is warm and dry.   Neurological:      General: No focal deficit present.      Mental Status: She is alert and oriented to person, place, and time.   Psychiatric:         Mood and Affect: Mood normal.         Behavior: Behavior normal.         Thought Content: Thought content normal.          Additional Data:     Lab Results:  Results from last 7 days   Lab Units 08/16/24  2125   WBC Thousand/uL 10.68*   HEMOGLOBIN g/dL 12.9   HEMATOCRIT % 41.5   PLATELETS Thousands/uL 249   SEGS PCT % 85*   LYMPHO PCT % 10*   MONO PCT % 4   EOS PCT % 0     Results from last 7 days   Lab Units 08/16/24  2125   SODIUM mmol/L 140   POTASSIUM mmol/L 4.0   CHLORIDE mmol/L 105   CO2 mmol/L 26   BUN mg/dL 12   CREATININE mg/dL 0.55*   ANION GAP mmol/L 9   CALCIUM mg/dL 9.9   ALBUMIN g/dL 4.0   TOTAL BILIRUBIN mg/dL 0.44   ALK PHOS U/L 55   ALT U/L 20   AST U/L 12*   GLUCOSE RANDOM mg/dL 210*             Lab Results   Component Value Date    HGBA1C 7.2 (H) 11/01/2023    HGBA1C 7.5 (H) 03/09/2023    HGBA1C 8.7 (H) 04/04/2022           Lines/Drains:  Invasive Devices       Peripheral Intravenous Line   Duration             Peripheral IV 08/16/24 Left Antecubital <1 day                        Imaging: Personally reviewed the following imaging: chest xray  XR chest 2 views   ED Interpretation by Vianney Hogan DO (08/16 2246)   NAD          EKG and Other Studies Reviewed on Admission:   EKG: Personally Reviewed.  Normal sinus rhythm.    ** Please Note: This note has been constructed using a voice recognition system. **

## 2024-08-17 NOTE — PLAN OF CARE
Problem: PAIN - ADULT  Goal: Verbalizes/displays adequate comfort level or baseline comfort level  Description: Interventions:  - Encourage patient to monitor pain and request assistance  - Assess pain using appropriate pain scale  - Administer analgesics based on type and severity of pain and evaluate response  - Implement non-pharmacological measures as appropriate and evaluate response  - Consider cultural and social influences on pain and pain management  - Notify physician/advanced practitioner if interventions unsuccessful or patient reports new pain  Outcome: Progressing     Problem: Knowledge Deficit  Goal: Patient/family/caregiver demonstrates understanding of disease process, treatment plan, medications, and discharge instructions  Description: Complete learning assessment and assess knowledge base.  Interventions:  - Provide teaching at level of understanding  - Provide teaching via preferred learning methods  Outcome: Progressing     Problem: RESPIRATORY - ADULT  Goal: Achieves optimal ventilation and oxygenation  Description: INTERVENTIONS:  - Assess for changes in respiratory status  - Assess for changes in mentation and behavior  - Position to facilitate oxygenation and minimize respiratory effort  - Oxygen administered by appropriate delivery if ordered  - Initiate smoking cessation education as indicated  - Encourage broncho-pulmonary hygiene including cough, deep breathe, Incentive Spirometry  - Assess the need for suctioning and aspirate as needed  - Assess and instruct to report SOB or any respiratory difficulty  - Respiratory Therapy support as indicated  Outcome: Progressing

## 2024-08-17 NOTE — RESPIRATORY THERAPY NOTE
RT Protocol Note  Rebekah Lamb 69 y.o. female MRN: [id number]  Unit/Bed#: -01 Encounter: [id number]    Assessment    Principal Problem:    Acute exacerbation of chronic obstructive pulmonary disease (HCC)  Active Problems:    Hypertension    Type 2 diabetes mellitus without complication, without long-term current use of insulin (HCC)      Home Pulmonary Medications:    Home Devices/Therapy: Home O2    Past Medical History:   Diagnosis Date    Asthma     Colon polyp     COPD (chronic obstructive pulmonary disease) (HCC)     Diabetes mellitus (HCC)      Social History     Socioeconomic History    Marital status: /Civil Union     Spouse name: None    Number of children: None    Years of education: None    Highest education level: None   Occupational History    None   Tobacco Use    Smoking status: Some Days     Current packs/day: 0.00     Types: Cigarettes     Last attempt to quit:      Years since quittin.6    Smokeless tobacco: Never   Vaping Use    Vaping status: Never Used   Substance and Sexual Activity    Alcohol use: Never    Drug use: Never    Sexual activity: None   Other Topics Concern    None   Social History Narrative    None     Social Determinants of Health     Financial Resource Strain: Low Risk  (2024)    Received from [facility]    Overall Financial Resource Strain (CARDIA)     Difficulty of Paying Living Expenses: Not hard at all   Food Insecurity: No Food Insecurity (2024)    Received from [facility]    Hunger Vital Sign     Worried About Running Out of Food in the Last Year: Never true     Ran Out of Food in the Last Year: Never true   Transportation Needs: No Transportation Needs (2024)    Received from [facility]    PRAPARE - Transportation     Lack of Transportation (Medical): No     Lack of Transportation (Non-Medical): No   Physical Activity: Not on file   Stress: Not on file   Social Connections: Not on file    Intimate Partner Violence: Not At Risk (7/5/2024)    Received from [facility]    Humiliation, Afraid, Rape, and Kick questionnaire     Fear of Current or Ex-Partner: No     Emotionally Abused: No     Physically Abused: No     Sexually Abused: No   Housing Stability: Low Risk  (7/5/2024)    Received from [facility]    Housing Stability Vital Sign     Unable to Pay for Housing in the Last Year: No     Number of Times Moved in the Last Year: 0     Homeless in the Last Year: No       Subjective         Objective    Physical Exam:   Assessment Type: During-treatment  General Appearance: Alert, Awake  Respiratory Pattern: Dyspnea with exertion  Chest Assessment: Chest expansion symmetrical  Bilateral Breath Sounds: Diminished, Expiratory wheezes  Cough: None  O2 Device: nc    Vitals:  Blood pressure (!) 159/102, pulse (!) 108, temperature 98.3 °F (36.8 °C), resp. rate 18, SpO2 97%.          Imaging and other studies: I have personally reviewed pertinent reports.      O2 Device: nc     Plan    Respiratory Plan: Mild Distress pathway        Resp Comments: Pt has some mild distress. spo2 on 3L nc 97%. breath sounds diminished with exp wheezes. continue tx's as ordered.

## 2024-08-17 NOTE — PLAN OF CARE
Problem: Knowledge Deficit  Goal: Patient/family/caregiver demonstrates understanding of disease process, treatment plan, medications, and discharge instructions  Description: Complete learning assessment and assess knowledge base.  Interventions:  - Provide teaching at level of understanding  - Provide teaching via preferred learning methods  Outcome: Progressing     Problem: RESPIRATORY - ADULT  Goal: Achieves optimal ventilation and oxygenation  Description: INTERVENTIONS:  - Assess for changes in respiratory status  - Assess for changes in mentation and behavior  - Position to facilitate oxygenation and minimize respiratory effort  - Oxygen administered by appropriate delivery if ordered  - Initiate smoking cessation education as indicated  - Encourage broncho-pulmonary hygiene including cough, deep breathe, Incentive Spirometry  - Assess the need for suctioning and aspirate as needed  - Assess and instruct to report SOB or any respiratory difficulty  - Respiratory Therapy support as indicated  Outcome: Progressing

## 2024-08-17 NOTE — ED PROVIDER NOTES
History  Chief Complaint   Patient presents with    Shortness of Breath     Patient arrives to the ER from home with complaints of shortness of breath x 1 week. Hx of asthma. Pt wears oxygen @ home (3lnc). Pt states she took an epi pen prior to arrival.      Patient is a 69-year-old female past medical history of COPD on 3 L home oxygen, hypertension, diabetes presenting for shortness of breath.  Patient has shortness of breath for the last week and states she has been using her nebulizer 3-4 times a day.  Has a history of 5 intubations relative to her COPD.  Denies any cough or fevers, chest pain, nausea or vomiting, dizziness, rashes, vision changes, dysuria.  Has baseline right lower extremity swelling.  Denies any history of blood clots, clotting disorders, cancer diagnosis, recent surgeries or immobilization or use of aspirin products.  Took EpiPen prior to arrival as she states that she has been told in the past by her physician if she needed to take it.  She denies any lip or tongue swelling, nausea or vomiting, rashes.  States has been compliant with her medications.        Prior to Admission Medications   Prescriptions Last Dose Informant Patient Reported? Taking?   ATROVENT HFA 17 MCG/ACT inhaler 2024 Self Yes Yes   Si puffs 4 (four) times a day   Alcohol Swabs (Alcohol Wipes) 70 % PADS Unknown Self Yes No   EPINEPHrine (EPIPEN) 0.3 mg/0.3 mL SOAJ 2024 Self Yes Yes   Sig: inject 0.3 milliliter (0.3 MG TOTAL) intramuscularly ONE TIME AS NEEEDED FOR ANAPHYLAXIS   PROVENTIL  (90 Base) MCG/ACT inhaler 2024 Self Yes Yes   SYMBICORT 160-4.5 MCG/ACT inhaler 2024 Self Yes Yes   Trulicity 1.5 MG/0.5ML injection Past Week Self Yes Yes   Sig: Per pt, not taking(backordered)   alendronate (FOSAMAX) 70 mg tablet  Self Yes No   Sig: Take 70 mg by mouth Once a week   azelastine (ASTELIN) 0.1 % nasal spray Not Taking Self Yes No   Patient not taking: Reported on 2024   docusate sodium  (COLACE) 100 mg capsule Not Taking  No No   Sig: Take 1 capsule (100 mg total) by mouth 2 (two) times a day   Patient not taking: Reported on 2024   ergocalciferol (VITAMIN D2) 50,000 units 2024 Self Yes Yes   Sig: Take 50,000 Units by mouth once a week   esomeprazole (NexIUM) 20 mg capsule 2024  No Yes   Sig: Take 1 capsule (20 mg total) by mouth 2 (two) times a day before meals   fluticasone (FLONASE) 50 mcg/act nasal spray Not Taking Self Yes No   Si spray 2 (two) times a day   Patient not taking: Reported on 2024   hydrocortisone (ANUSOL-HC) 2.5 % rectal cream Not Taking  No No   Sig: Apply topically 2 (two) times a day   Patient not taking: Reported on 2024   hydrocortisone (ANUSOL-HC) 25 mg suppository Not Taking  No No   Sig: Insert 1 suppository (25 mg total) into the rectum 2 (two) times a day   Patient not taking: Reported on 2024   levalbuterol (XOPENEX HFA) 45 mcg/act inhaler 2024 Self Yes Yes   levocetirizine (XYZAL) 5 MG tablet 2024 Self Yes Yes   Sig: levocetirizine 5 mg tablet   losartan (COZAAR) 50 mg tablet 2024  Yes Yes   Sig: Take 50 mg by mouth daily   montelukast (SINGULAIR) 10 mg tablet 2024 Self Yes Yes   Sig: montelukast 10 mg tablet   nicotine (NICODERM CQ) 14 mg/24hr TD 24 hr patch Not Taking Self No No   Sig: Place 1 patch on the skin over 24 hours daily   Patient not taking: Reported on 2024   nystatin-triamcinolone (MYCOLOG-II) ointment Not Taking Self Yes No   Sig: Apply 1 application. topically 2 (two) times a day   Patient not taking: Reported on 2024   predniSONE 10 mg tablet 2024 Self No Yes   Sig: Take 1 tablet (10 mg total) by mouth 2 (two) times a day Do not start before 2023.   promethazine-dextromethorphan (PHENERGAN-DM) 6.25-15 mg/5 mL oral syrup 2024 Self Yes Yes   temazepam (RESTORIL) 30 mg capsule Not Taking Self Yes No   Sig: take 1 capsule by mouth nightly if needed for sleep   Patient  not taking: Reported on 2024      Facility-Administered Medications: None       Past Medical History:   Diagnosis Date    Asthma     Colon polyp     COPD (chronic obstructive pulmonary disease) (HCC)     Diabetes mellitus (HCC)        Past Surgical History:   Procedure Laterality Date    COLONOSCOPY      MN EXCISION GANGLION WRIST DORSAL/VOLAR PRIMARY Left 01/10/2020    Procedure: DORSAL WRIST GANGLION CYST EXCISION; DISTAL RADIUS CYST EXCISION;  Surgeon: Bin Aguiar MD;  Location: [facility];  Service: Orthopedics    MN INCISION EXTENSOR TENDON SHEATH WRIST Left 01/10/2020    Procedure: DEQUERVAINS RELEASE;  Surgeon: Bin Aguiar MD;  Location: [facility];  Service: Orthopedics       History reviewed. No pertinent family history.  I have reviewed and agree with the history as documented.    E-Cigarette/Vaping    E-Cigarette Use Never User      E-Cigarette/Vaping Substances    Nicotine No     THC No     CBD No     Flavoring No     Other No     Unknown No      Social History     Tobacco Use    Smoking status: Some Days     Current packs/day: 0.00     Types: Cigarettes     Last attempt to quit:      Years since quittin.6    Smokeless tobacco: Never   Vaping Use    Vaping status: Never Used   Substance Use Topics    Alcohol use: Never    Drug use: Never       Review of Systems   All other systems reviewed and are negative.      Physical Exam  Physical Exam  Vitals reviewed.   Constitutional:       General: She is not in acute distress.     Appearance: Normal appearance. She is not ill-appearing.   HENT:      Mouth/Throat:      Mouth: Mucous membranes are moist.   Eyes:      Conjunctiva/sclera: Conjunctivae normal.      Comments: Normal conjunctiva   Cardiovascular:      Rate and Rhythm: Regular rhythm. Tachycardia present.      Pulses: Normal pulses.      Heart sounds: Normal heart sounds.   Pulmonary:      Effort: Pulmonary effort is normal. No accessory muscle usage.      Breath sounds: No stridor.  Examination of the right-upper field reveals wheezing. Examination of the left-upper field reveals wheezing. Examination of the right-middle field reveals wheezing. Examination of the left-middle field reveals wheezing. Examination of the right-lower field reveals wheezing. Examination of the left-lower field reveals wheezing. Wheezing present.   Abdominal:      General: Abdomen is flat.      Palpations: Abdomen is soft.      Tenderness: There is no abdominal tenderness.   Musculoskeletal:         General: No swelling. Normal range of motion.      Cervical back: Neck supple.      Right lower leg: No tenderness. No edema.      Left lower leg: No tenderness. No edema.   Skin:     General: Skin is warm and dry.   Neurological:      General: No focal deficit present.      Mental Status: She is alert.   Psychiatric:         Mood and Affect: Mood normal.         Vital Signs  ED Triage Vitals   Temperature Pulse Respirations Blood Pressure SpO2   08/16/24 2058 08/16/24 2058 08/16/24 2058 08/16/24 2058 08/16/24 2058   (!) 97.2 °F (36.2 °C) 96 20 (!) 214/96 97 %      Temp Source Heart Rate Source Patient Position - Orthostatic VS BP Location FiO2 (%)   08/16/24 2058 08/16/24 2058 08/16/24 2058 08/16/24 2058 --   Tympanic Monitor Sitting Left arm       Pain Score       08/16/24 2300       No Pain           Vitals:    08/20/24 1517 08/20/24 2311 08/21/24 0727 08/21/24 0728   BP: 122/74 127/68  150/82   Pulse: (!) 108 97 99 98   Patient Position - Orthostatic VS:             Visual Acuity      ED Medications  Medications   albuterol inhalation solution 10 mg (10 mg Nebulization Given 8/16/24 2121)   ipratropium (ATROVENT) 0.02 % inhalation solution 1 mg (1 mg Nebulization Given 8/16/24 2121)   sodium chloride 0.9 % inhalation solution 12 mL (12 mL Nebulization Given 8/16/24 2121)   methylPREDNISolone sodium succinate (Solu-MEDROL) injection 100 mg (100 mg Intravenous Given 8/16/24 2122)   magnesium sulfate 2 g/50 mL IVPB  (premix) 2 g (0 g Intravenous Stopped 8/16/24 2307)   nitroglycerin (NITROSTAT) SL tablet 0.4 mg (0.4 mg Sublingual Given 8/16/24 2308)   levofloxacin (LEVAQUIN) IVPB (premix in dextrose) 750 mg 150 mL (750 mg Intravenous New Bag 8/16/24 2308)   hydrALAZINE (APRESOLINE) injection 10 mg (10 mg Intravenous Given 8/16/24 2308)   azithromycin (ZITHROMAX) tablet 500 mg (500 mg Oral Given 8/19/24 0826)   iohexol (OMNIPAQUE) 350 MG/ML injection (MULTI-DOSE) 85 mL (85 mL Intravenous Given 8/17/24 0826)   insulin lispro (HumALOG/ADMELOG) 100 units/mL subcutaneous injection 5 Units (5 Units Subcutaneous Given 8/20/24 0627)       Diagnostic Studies  Results Reviewed       Procedure Component Value Units Date/Time    Hemoglobin A1c w/EAG Estimation (Prechecked if no A1C within 90 days) [[id number]]  (Abnormal) Collected: 08/16/24 2125    Lab Status: Final result Specimen: Blood from Arm, Left Updated: 08/17/24 1309     Hemoglobin A1C 7.3 %       mg/dl     HS Troponin I 2hr [[id number]]  (Normal) Collected: 08/16/24 2323    Lab Status: Final result Specimen: Blood from Arm, Left Updated: 08/16/24 2350     hs TnI 2hr 6 ng/L      Delta 2hr hsTnI 0 ng/L     FLU/RSV/COVID - if FLU/RSV clinically relevant [[id number]]  (Normal) Collected: 08/16/24 2230    Lab Status: Final result Specimen: Nares from Nose Updated: 08/16/24 2316     SARS-CoV-2 Negative     INFLUENZA A PCR Negative     INFLUENZA B PCR Negative     RSV PCR Negative    Narrative:      This test has been performed using the CoV-2/Flu/RSV plus assay on the [employer] GeneXpert platform. This test has been validated by the  and verified by the performing laboratory.     This test is designed to amplify and detect the following: nucleocapsid (N), envelope (E), and RNA-dependent RNA polymerase (RdRP) genes of the SARS-CoV-2 genome; matrix (M), basic polymerase (PB2), and acidic protein (PA) segments of the influenza A genome; matrix (M) and non-structural protein  (NS) segments of the influenza B genome, and the nucleocapsid genes of RSV A and RSV B.     Positive results are indicative of the presence of Flu A, Flu B, RSV, and/or SARS-CoV-2 RNA. Positive results for SARS-CoV-2 or suspected novel influenza should be reported to state, local, or federal health departments according to local reporting requirements.      All results should be assessed in conjunction with clinical presentation and other laboratory markers for clinical management.     FOR PEDIATRIC PATIENTS - copy/paste COVID Guidelines URL to browser: [email or web address]       D-Dimer [[id number]]  (Abnormal) Collected: 08/16/24 2125    Lab Status: Final result Specimen: Blood from Arm, Left Updated: 08/16/24 2231     D-Dimer, Quant 0.55 ug/ml FEU     Narrative:      In the evaluation for possible pulmonary embolism, in the appropriate (Well's Score of 4 or less) patient, the age adjusted d-dimer cutoff for this patient can be calculated as:    Age x 0.01 (in ug/mL) for Age-adjusted D-dimer exclusion threshold for a patient over 50 years.    B-Type Natriuretic Peptide(BNP) [[id number]]  (Normal) Collected: 08/16/24 2125    Lab Status: Final result Specimen: Blood from Arm, Left Updated: 08/16/24 2159     BNP 41 pg/mL     HS Troponin 0hr (reflex protocol) [[id number]]  (Normal) Collected: 08/16/24 2125    Lab Status: Final result Specimen: Blood from Arm, Left Updated: 08/16/24 2158     hs TnI 0hr 6 ng/L     Comprehensive metabolic panel [[id number]]  (Abnormal) Collected: 08/16/24 2125    Lab Status: Final result Specimen: Blood from Arm, Left Updated: 08/16/24 2157     Sodium 140 mmol/L      Potassium 4.0 mmol/L      Chloride 105 mmol/L      CO2 26 mmol/L      ANION GAP 9 mmol/L      BUN 12 mg/dL      Creatinine 0.55 mg/dL      Glucose 210 mg/dL      Calcium 9.9 mg/dL      AST 12 U/L      ALT 20 U/L      Alkaline Phosphatase 55 U/L      Total Protein 6.3 g/dL       Albumin 4.0 g/dL      Total Bilirubin 0.44 mg/dL      eGFR 96 ml/min/1.73sq m     Narrative:      National Kid[employer] guidelines for Chronic Kidney Disease (CKD):     Stage 1 with normal or high GFR (GFR > 90 mL/min/1.73 square meters)    Stage 2 Mild CKD (GFR = 60-89 mL/min/1.73 square meters)    Stage 3A Moderate CKD (GFR = 45-59 mL/min/1.73 square meters)    Stage 3B Moderate CKD (GFR = 30-44 mL/min/1.73 square meters)    Stage 4 Severe CKD (GFR = 15-29 mL/min/1.73 square meters)    Stage 5 End Stage CKD (GFR <15 mL/min/1.73 square meters)  Note: GFR calculation is accurate only with a steady state creatinine    CBC and differential [[id number]]  (Abnormal) Collected: 08/16/24 2125    Lab Status: Final result Specimen: Blood from Arm, Left Updated: 08/16/24 2136     WBC 10.68 Thousand/uL      RBC 4.54 Million/uL      Hemoglobin 12.9 g/dL      Hematocrit 41.5 %      MCV 91 fL      MCH 28.4 pg      MCHC 31.1 g/dL      RDW 14.5 %      MPV 9.6 fL      Platelets 249 Thousands/uL      nRBC 0 /100 WBCs      Segmented % 85 %      Immature Grans % 1 %      Lymphocytes % 10 %      Monocytes % 4 %      Eosinophils Relative 0 %      Basophils Relative 0 %      Absolute Neutrophils 9.06 Thousands/µL      Absolute Immature Grans 0.13 Thousand/uL      Absolute Lymphocytes 1.05 Thousands/µL      Absolute Monocytes 0.43 Thousand/µL      Eosinophils Absolute 0.00 Thousand/µL      Basophils Absolute 0.01 Thousands/µL                    CTA chest pe study   Final Result by Rosita Morris MD (08/17 0923)   No pulmonary embolism identified within the limitations of suboptimal bolus.   A few scattered bilateral interstitial densities, nonspecific. Otherwise clear lungs.   No pleural or pericardial effusion.   Incidental findings, as per the body of the report.                     Workstation performed: [id number]         XR chest 2 views   ED Interpretation by Vianney Hogan DO (08/16 2246)   NAD      Final Result  by Scarlet Noland MD (08/17 1857)      No acute cardiopulmonary disease.            Workstation performed: [id number]                    Procedures  ECG 12 Lead Documentation Only    Date/Time: 8/16/2024 9:23 PM    Performed by: Vianney Hogan DO  Authorized by: Vianney Hogan DO    Patient location:  ED  Previous ECG:     Previous ECG:  Compared to current    Comparison ECG info:  Prior inferior T wave inversions now upright  Interpretation:     Interpretation: normal    Rate:     ECG rate assessment: normal    Rhythm:     Rhythm: sinus rhythm    Ectopy:     Ectopy: none    QRS:     QRS axis:  Normal    QRS intervals:  Normal  Conduction:     Conduction: normal    ST segments:     ST segments:  Normal  T waves:     T waves: normal    CriticalCare Time    Date/Time: 8/16/2024 11:02 PM    Performed by: Vianney Hogan DO  Authorized by: Vianney Hogan DO    Critical care provider statement:     Critical care time (minutes):  32    Critical care time was exclusive of:  Separately billable procedures and treating other patients and teaching time    Critical care was necessary to treat or prevent imminent or life-threatening deterioration of the following conditions:  Respiratory failure    Critical care was time spent personally by me on the following activities:  Obtaining history from patient or surrogate, development of treatment plan with patient or surrogate, discussions with consultants, evaluation of patient's response to treatment, examination of patient, interpretation of cardiac output measurements, ordering and performing treatments and interventions, ordering and review of laboratory studies, ordering and review of radiographic studies and re-evaluation of patient's condition           ED Course  ED Course as of 08/23/24 0729   Fri Aug 16, 2024   2245 Patient with improved air movement and wheezing following nebulizer breathing treatments, age-adjusted D-dimer negative, will  give blood pressure control however BNP negative, chest x-ray does not seem consistent with pulmonary edema, will admit for COPD exacerbation and give antibiotics.                                 SBIRT 22yo+      Flowsheet Row Most Recent Value   Initial Alcohol Screen: US AUDIT-C     1. How often do you have a drink containing alcohol? 0 Filed at: 08/16/2024 2115   2. How many drinks containing alcohol do you have on a typical day you are drinking?  0 Filed at: 08/16/2024 2115   3a. Male UNDER 65: How often do you have five or more drinks on one occasion? 0 Filed at: 08/16/2024 2115   3b. FEMALE Any Age, or MALE 65+: How often do you have 4 or more drinks on one occassion? 0 Filed at: 08/16/2024 2115   Audit-C Score 0 Filed at: 08/16/2024 2115   ALEJANDRO: How many times in the past year have you...    Used an illegal drug or used a prescription medication for non-medical reasons? Never Filed at: 08/16/2024 2115                      Medical Decision Making  Patient is a 69-year-old female past medical history of COPD on 3 L, hypertension, diabetes presenting with shortness of breath.  Patient is mildly ill-appearing at bedside but with no conversational dyspnea with diffuse expiratory wheezing without retractions or accessory muscle use, no stridor, no lower extremity edema, equal pulses, no oropharyngeal edema, angioedema, stridor, rashes, nausea or vomiting.  Do not feel the patient requires further treatment for allergic reaction/anaphylaxis at this time and though she is hypertensive with no lower extremity edema and with diffuse wheezing suspect COPD exacerbation over CHF and will treat for COPD however will administer blood pressure control if not improving and will obtain cardiac workup to assess for CHF, pulmonary edema, pneumonia, pneumothorax, D-dimer to assess for pulmonary embolism, EKG and troponin to assess for ACS and continue to monitor.    Amount and/or Complexity of Data Reviewed  Labs:  ordered.  Radiology: ordered and independent interpretation performed.    Risk  Prescription drug management.  Decision regarding hospitalization.                 Disposition  Final diagnoses:   COPD exacerbation (HCC)     Time reflects when diagnosis was documented in both MDM as applicable and the Disposition within this note       Time User Action Codes Description Comment    8/16/2024 11:21 PM Vianney Hogan Add [J44.1] COPD exacerbation (HCC)     8/21/2024 10:59 AM Lukasz Goodrich [K21.9] Gastroesophageal reflux disease without esophagitis           ED Disposition       ED Disposition   Admit    Condition   Stable    Date/Time   Fri Aug 16, 2024 7856    Comment   Case was discussed with Carine Bojorquez and the patient's admission status was agreed to be Admission Status: observation status to the service of Dr. Rodriguez .               Follow-up Information    None         Discharge Medication List as of 8/21/2024 11:18 AM        START taking these medications    Details   azithromycin (ZITHROMAX) 250 mg tablet Take 1 tablet (250 mg total) by mouth every 24 hours for 2 days, Starting Wed 8/21/2024, Until Fri 8/23/2024, Normal           CONTINUE these medications which have CHANGED    Details   esomeprazole (NexIUM) 20 mg capsule Take 2 capsules (40 mg total) by mouth daily in the early morning, Starting Wed 8/21/2024, Normal      predniSONE 10 mg tablet Multiple Dosages:Starting Wed 8/21/2024, Until Fri 8/23/2024 at 2359, THEN Starting Sat 8/24/2024, Until Mon 8/26/2024 at 2359, THEN Starting Tue 8/27/2024, Until Thu 8/29/2024 at 2359, THEN Starting Fri 8/30/2024, Until Sun 9/1/2024 at 2359Take 4 t ablets (40 mg total) by mouth daily for 3 days, THEN 3 tablets (30 mg total) daily for 3 days, THEN 2 tablets (20 mg total) daily for 3 days, THEN 1 tablet (10 mg total) daily for 3 days., Normal           CONTINUE these medications which have NOT CHANGED    Details   ATROVENT HFA 17 MCG/ACT inhaler 2 puffs  4 (four) times a day, Starting Fri 2/28/2020, Historical Med      EPINEPHrine (EPIPEN) 0.3 mg/0.3 mL SOAJ inject 0.3 milliliter (0.3 MG TOTAL) intramuscularly ONE TIME AS NEEEDED FOR ANAPHYLAXIS, Historical Med      ergocalciferol (VITAMIN D2) 50,000 units Take 50,000 Units by mouth once a week, Starting Mon 1/23/2023, Historical Med      levalbuterol (XOPENEX HFA) 45 mcg/act inhaler Historical Med      levocetirizine (XYZAL) 5 MG tablet levocetirizine 5 mg tablet, Historical Med      losartan (COZAAR) 50 mg tablet Take 50 mg by mouth daily, Historical Med      montelukast (SINGULAIR) 10 mg tablet montelukast 10 mg tablet, Historical Med      promethazine-dextromethorphan (PHENERGAN-DM) 6.25-15 mg/5 mL oral syrup Historical Med      PROVENTIL  (90 Base) MCG/ACT inhaler Starting Wed 7/31/2019, Historical Med      SYMBICORT 160-4.5 MCG/ACT inhaler Starting Mon 7/22/2019, Historical Med      Trulicity 1.5 MG/0.5ML injection Per pt, not taking(backordered), Historical Med      Alcohol Swabs (Alcohol Wipes) 70 % PADS Historical Med      alendronate (FOSAMAX) 70 mg tablet Take 70 mg by mouth Once a week, Starting Mon 5/2/2022, Until Thu 4/4/2024, Historical Med      azelastine (ASTELIN) 0.1 % nasal spray Historical Med      docusate sodium (COLACE) 100 mg capsule Take 1 capsule (100 mg total) by mouth 2 (two) times a day, Starting Thu 4/4/2024, Normal      fluticasone (FLONASE) 50 mcg/act nasal spray 1 spray 2 (two) times a day, Starting Thu 1/4/2024, Historical Med      hydrocortisone (ANUSOL-HC) 2.5 % rectal cream Apply topically 2 (two) times a day, Starting Fri 3/8/2024, Normal      hydrocortisone (ANUSOL-HC) 25 mg suppository Insert 1 suppository (25 mg total) into the rectum 2 (two) times a day, Starting Fri 3/8/2024, Normal      nicotine (NICODERM CQ) 14 mg/24hr TD 24 hr patch Place 1 patch on the skin over 24 hours daily, Starting Wed 7/26/2023, Normal      nystatin-triamcinolone (MYCOLOG-II) ointment Apply  1 application. topically 2 (two) times a day, Starting Wed 11/1/2023, Until Thu 10/31/2024, Historical Med      temazepam (RESTORIL) 30 mg capsule take 1 capsule by mouth nightly if needed for sleep, Historical Med             Outpatient Discharge Orders   Call provider for:  persistent nausea or vomiting     Call provider for:  severe uncontrolled pain     Call provider for:  difficulty breathing, headache or visual disturbances     Call provider for:  persistent dizziness or light-headedness       PDMP Review         Value Time User    PDMP Reviewed  Yes 8/16/2024 11:54 PM Carine Bojorquez PA-C            ED Provider  Electronically Signed by             Vianney Hogan, DO  08/17/24 0002       Vianney Hogan, DO  08/23/24 0729

## 2024-08-17 NOTE — ASSESSMENT & PLAN NOTE
"Lab Results   Component Value Date    HGBA1C 7.2 (H) 11/01/2023       No results for input(s): \"POCGLU\" in the last 72 hours.    Blood Sugar Average: Last 72 hrs:    Check hemoglobin A1c  Blood glucose checks 4 times daily, hypoglycemia protocol  ssi  "

## 2024-08-17 NOTE — RESPIRATORY THERAPY NOTE
RT Protocol Note  Rebekah Lamb 69 y.o. female MRN: [id number]  Unit/Bed#: -01 Encounter: [id number]    Assessment    Principal Problem:    Acute exacerbation of chronic obstructive pulmonary disease (HCC)  Active Problems:    Hypertension    Type 2 diabetes mellitus without complication, without long-term current use of insulin (HCC)      Home Pulmonary Medications:     24 0100   Respiratory Protocol   Protocol Initiated? Yes   Protocol Selection Respiratory   Language Barrier? No   Medical & Social History Reviewed? Yes   Diagnostic Studies Reviewed? Yes   Physical Assessment Performed? Yes   Home Devices/Therapy Home O2   Respiratory Plan Mild Distress pathway   Respiratory Assessment   Assessment Type Assess only   General Appearance Alert;Awake   Respiratory Pattern Dyspnea with exertion   Chest Assessment Chest expansion symmetrical   Bilateral Breath Sounds Diminished;Expiratory wheezes   Cough None   Resp Comments Pt uses inhalers and nebs at home. Pt also use home O2 and has a history of asthma and COPD. No cpap use. Tx ordered Q6 at this time.   O2 Device NC   Additional Assessments   Pulse (!) 111   Respirations 18   SpO2 97 %       Home Devices/Therapy: Home O2    Past Medical History:   Diagnosis Date    Asthma     Colon polyp     COPD (chronic obstructive pulmonary disease) (HCC)     Diabetes mellitus (HCC)      Social History     Socioeconomic History    Marital status: /Civil Union     Spouse name: None    Number of children: None    Years of education: None    Highest education level: None   Occupational History    None   Tobacco Use    Smoking status: Some Days     Current packs/day: 0.00     Types: Cigarettes     Last attempt to quit: 2015     Years since quittin.6    Smokeless tobacco: Never   Vaping Use    Vaping status: Never Used   Substance and Sexual Activity    Alcohol use: Never    Drug use: Never    Sexual activity: None   Other Topics Concern    None   Social History  Narrative    None     Social Determinants of Health     Financial Resource Strain: Low Risk  (7/5/2024)    Received from [facility]    Overall Financial Resource Strain (CARDIA)     Difficulty of Paying Living Expenses: Not hard at all   Food Insecurity: No Food Insecurity (7/5/2024)    Received from [facility]    Hunger Vital Sign     Worried About Running Out of Food in the Last Year: Never true     Ran Out of Food in the Last Year: Never true   Transportation Needs: No Transportation Needs (7/5/2024)    Received from [facility]    PRAPARE - Transportation     Lack of Transportation (Medical): No     Lack of Transportation (Non-Medical): No   Physical Activity: Not on file   Stress: Not on file   Social Connections: Not on file   Intimate Partner Violence: Not At Risk (7/5/2024)    Received from [facility]    Humiliation, Afraid, Rape, and Kick questionnaire     Fear of Current or Ex-Partner: No     Emotionally Abused: No     Physically Abused: No     Sexually Abused: No   Housing Stability: Low Risk  (7/5/2024)    Received from [facility]    Housing Stability Vital Sign     Unable to Pay for Housing in the Last Year: No     Number of Times Moved in the Last Year: 0     Homeless in the Last Year: No       Subjective         Objective    Physical Exam:   Assessment Type: Assess only  General Appearance: Alert, Awake  Respiratory Pattern: Dyspnea with exertion  Chest Assessment: Chest expansion symmetrical  Bilateral Breath Sounds: Diminished, Expiratory wheezes  Cough: None  O2 Device: NC    Vitals:  Blood pressure 141/93, pulse (!) 111, temperature 97.8 °F (36.6 °C), temperature source Oral, resp. rate 18, SpO2 97%.          Imaging and other studies: I have personally reviewed pertinent reports.      O2 Device: NC     Plan    Respiratory Plan: Mild Distress pathway        Resp Comments: Pt uses inhalers and nebs at home. Pt  also use home O2 and has a history of asthma and COPD. No cpap use. Tx ordered Q6 at this time.

## 2024-08-17 NOTE — ASSESSMENT & PLAN NOTE
Worsening wheezing from baseline and increased shortness of breath today despite using inhalers at home  Improving shortness of breath after nebulizer treatment and IV Solu-Medrol in the ED, but still persistent wheezing  With/flu/RSV negative  Currently saturating in the mid 90s on 3 L, wean down as tolerated, chest x-ray WNL, age-adjusted D-dimer WNL  Respiratory protocol  If patient is still here on Monday consider pulmonology consult  Scheduled Xopenex every 6 hours nebulizer treatment  IV Solu-Medrol 40 mg every 8 hours  Continue home inhalers

## 2024-08-18 LAB
ANION GAP SERPL CALCULATED.3IONS-SCNC: 5 MMOL/L (ref 4–13)
ATRIAL RATE: 87 BPM
BASOPHILS # BLD AUTO: 0.03 THOUSANDS/ÂΜL (ref 0–0.1)
BASOPHILS NFR BLD AUTO: 0 % (ref 0–1)
BUN SERPL-MCNC: 23 MG/DL (ref 5–25)
CALCIUM SERPL-MCNC: 9.1 MG/DL (ref 8.4–10.2)
CHLORIDE SERPL-SCNC: 106 MMOL/L (ref 96–108)
CO2 SERPL-SCNC: 29 MMOL/L (ref 21–32)
CREAT SERPL-MCNC: 0.64 MG/DL (ref 0.6–1.3)
EOSINOPHIL # BLD AUTO: 0 THOUSAND/ÂΜL (ref 0–0.61)
EOSINOPHIL NFR BLD AUTO: 0 % (ref 0–6)
ERYTHROCYTE [DISTWIDTH] IN BLOOD BY AUTOMATED COUNT: 14.4 % (ref 11.6–15.1)
GFR SERPL CREATININE-BSD FRML MDRD: 91 ML/MIN/1.73SQ M
GLUCOSE SERPL-MCNC: 218 MG/DL (ref 65–140)
GLUCOSE SERPL-MCNC: 222 MG/DL (ref 65–140)
GLUCOSE SERPL-MCNC: 243 MG/DL (ref 65–140)
GLUCOSE SERPL-MCNC: 302 MG/DL (ref 65–140)
GLUCOSE SERPL-MCNC: 363 MG/DL (ref 65–140)
HCT VFR BLD AUTO: 39.4 % (ref 34.8–46.1)
HGB BLD-MCNC: 12.7 G/DL (ref 11.5–15.4)
IMM GRANULOCYTES # BLD AUTO: 0.38 THOUSAND/UL (ref 0–0.2)
IMM GRANULOCYTES NFR BLD AUTO: 2 % (ref 0–2)
LYMPHOCYTES # BLD AUTO: 1.15 THOUSANDS/ÂΜL (ref 0.6–4.47)
LYMPHOCYTES NFR BLD AUTO: 7 % (ref 14–44)
MAGNESIUM SERPL-MCNC: 2 MG/DL (ref 1.9–2.7)
MCH RBC QN AUTO: 28.9 PG (ref 26.8–34.3)
MCHC RBC AUTO-ENTMCNC: 32.2 G/DL (ref 31.4–37.4)
MCV RBC AUTO: 90 FL (ref 82–98)
MONOCYTES # BLD AUTO: 0.62 THOUSAND/ÂΜL (ref 0.17–1.22)
MONOCYTES NFR BLD AUTO: 4 % (ref 4–12)
NEUTROPHILS # BLD AUTO: 14.46 THOUSANDS/ÂΜL (ref 1.85–7.62)
NEUTS SEG NFR BLD AUTO: 87 % (ref 43–75)
NRBC BLD AUTO-RTO: 0 /100 WBCS
P AXIS: 83 DEGREES
PLATELET # BLD AUTO: 243 THOUSANDS/UL (ref 149–390)
PMV BLD AUTO: 9.7 FL (ref 8.9–12.7)
POTASSIUM SERPL-SCNC: 4.3 MMOL/L (ref 3.5–5.3)
PR INTERVAL: 138 MS
PROCALCITONIN SERPL-MCNC: 0.09 NG/ML
QRS AXIS: 80 DEGREES
QRSD INTERVAL: 76 MS
QT INTERVAL: 358 MS
QTC INTERVAL: 430 MS
RBC # BLD AUTO: 4.4 MILLION/UL (ref 3.81–5.12)
SODIUM SERPL-SCNC: 140 MMOL/L (ref 135–147)
T WAVE AXIS: 73 DEGREES
VENTRICULAR RATE: 87 BPM
WBC # BLD AUTO: 16.64 THOUSAND/UL (ref 4.31–10.16)

## 2024-08-18 PROCEDURE — 83735 ASSAY OF MAGNESIUM: CPT

## 2024-08-18 PROCEDURE — 80048 BASIC METABOLIC PNL TOTAL CA: CPT

## 2024-08-18 PROCEDURE — 93010 ELECTROCARDIOGRAM REPORT: CPT | Performed by: STUDENT IN AN ORGANIZED HEALTH CARE EDUCATION/TRAINING PROGRAM

## 2024-08-18 PROCEDURE — 84145 PROCALCITONIN (PCT): CPT

## 2024-08-18 PROCEDURE — 85025 COMPLETE CBC W/AUTO DIFF WBC: CPT

## 2024-08-18 PROCEDURE — 94664 DEMO&/EVAL PT USE INHALER: CPT

## 2024-08-18 PROCEDURE — 94640 AIRWAY INHALATION TREATMENT: CPT

## 2024-08-18 PROCEDURE — 94760 N-INVAS EAR/PLS OXIMETRY 1: CPT

## 2024-08-18 PROCEDURE — 82948 REAGENT STRIP/BLOOD GLUCOSE: CPT

## 2024-08-18 PROCEDURE — 99232 SBSQ HOSP IP/OBS MODERATE 35: CPT | Performed by: FAMILY MEDICINE

## 2024-08-18 RX ADMIN — INSULIN LISPRO 3 UNITS: 100 INJECTION, SOLUTION INTRAVENOUS; SUBCUTANEOUS at 12:30

## 2024-08-18 RX ADMIN — GUAIFENESIN 1200 MG: 600 TABLET ORAL at 09:07

## 2024-08-18 RX ADMIN — MONTELUKAST 10 MG: 10 TABLET, FILM COATED ORAL at 09:07

## 2024-08-18 RX ADMIN — METHYLPREDNISOLONE SODIUM SUCCINATE 40 MG: 40 INJECTION, POWDER, FOR SOLUTION INTRAMUSCULAR; INTRAVENOUS at 21:59

## 2024-08-18 RX ADMIN — LEVALBUTEROL HYDROCHLORIDE 1.25 MG: 1.25 SOLUTION RESPIRATORY (INHALATION) at 07:06

## 2024-08-18 RX ADMIN — IPRATROPIUM BROMIDE 0.5 MG: 0.5 SOLUTION RESPIRATORY (INHALATION) at 07:06

## 2024-08-18 RX ADMIN — LEVALBUTEROL HYDROCHLORIDE 1.25 MG: 1.25 SOLUTION RESPIRATORY (INHALATION) at 01:46

## 2024-08-18 RX ADMIN — INSULIN LISPRO 1 UNITS: 100 INJECTION, SOLUTION INTRAVENOUS; SUBCUTANEOUS at 17:28

## 2024-08-18 RX ADMIN — INSULIN LISPRO 2 UNITS: 100 INJECTION, SOLUTION INTRAVENOUS; SUBCUTANEOUS at 09:08

## 2024-08-18 RX ADMIN — IPRATROPIUM BROMIDE 0.5 MG: 0.5 SOLUTION RESPIRATORY (INHALATION) at 01:46

## 2024-08-18 RX ADMIN — INSULIN LISPRO 3 UNITS: 100 INJECTION, SOLUTION INTRAVENOUS; SUBCUTANEOUS at 22:04

## 2024-08-18 RX ADMIN — IPRATROPIUM BROMIDE 0.5 MG: 0.5 SOLUTION RESPIRATORY (INHALATION) at 18:01

## 2024-08-18 RX ADMIN — ESOMEPRAZOLE MAGNESIUM 20 MG: 20 CAPSULE, DELAYED RELEASE ORAL at 05:20

## 2024-08-18 RX ADMIN — BUDESONIDE AND FORMOTEROL FUMARATE DIHYDRATE 2 PUFF: 160; 4.5 AEROSOL RESPIRATORY (INHALATION) at 09:09

## 2024-08-18 RX ADMIN — GUAIFENESIN 1200 MG: 600 TABLET ORAL at 21:59

## 2024-08-18 RX ADMIN — LOSARTAN POTASSIUM 50 MG: 50 TABLET, FILM COATED ORAL at 09:07

## 2024-08-18 RX ADMIN — INSULIN GLARGINE 5 UNITS: 100 INJECTION, SOLUTION SUBCUTANEOUS at 09:17

## 2024-08-18 RX ADMIN — AZITHROMYCIN 500 MG: 500 TABLET, FILM COATED ORAL at 09:07

## 2024-08-18 RX ADMIN — LABETALOL HYDROCHLORIDE 10 MG: 5 INJECTION, SOLUTION INTRAVENOUS at 21:59

## 2024-08-18 RX ADMIN — METHYLPREDNISOLONE SODIUM SUCCINATE 40 MG: 40 INJECTION, POWDER, FOR SOLUTION INTRAMUSCULAR; INTRAVENOUS at 13:33

## 2024-08-18 RX ADMIN — BUDESONIDE AND FORMOTEROL FUMARATE DIHYDRATE 2 PUFF: 160; 4.5 AEROSOL RESPIRATORY (INHALATION) at 17:29

## 2024-08-18 RX ADMIN — LEVALBUTEROL HYDROCHLORIDE 1.25 MG: 1.25 SOLUTION RESPIRATORY (INHALATION) at 13:18

## 2024-08-18 RX ADMIN — IPRATROPIUM BROMIDE 0.5 MG: 0.5 SOLUTION RESPIRATORY (INHALATION) at 13:18

## 2024-08-18 RX ADMIN — ENOXAPARIN SODIUM 40 MG: 40 INJECTION SUBCUTANEOUS at 09:07

## 2024-08-18 RX ADMIN — METHYLPREDNISOLONE SODIUM SUCCINATE 40 MG: 40 INJECTION, POWDER, FOR SOLUTION INTRAMUSCULAR; INTRAVENOUS at 05:19

## 2024-08-18 RX ADMIN — LEVALBUTEROL HYDROCHLORIDE 1.25 MG: 1.25 SOLUTION RESPIRATORY (INHALATION) at 18:01

## 2024-08-18 NOTE — ASSESSMENT & PLAN NOTE
Lab Results   Component Value Date    HGBA1C 7.3 (H) 08/16/2024       Recent Labs     08/17/24  1619 08/17/24  2042 08/18/24  0630 08/18/24  1038   POCGLU 224* 324* 243* 363*         Blood Sugar Average: Last 72 hrs:  (P) 283.4227617628953980    Blood glucose checks 4 times daily with sliding scale coverage, hypoglycemia protocol  Lantus 5 units Qam

## 2024-08-18 NOTE — RESPIRATORY THERAPY NOTE
RT Protocol Note  Rebekah Lamb 69 y.o. female MRN: [id number]  Unit/Bed#: -01 Encounter: [id number]    Assessment    Principal Problem:    Acute exacerbation of chronic obstructive pulmonary disease (HCC)  Active Problems:    Hypertension    Chronic respiratory failure (HCC)    Asthma    Type 2 diabetes mellitus without complication, without long-term current use of insulin (HCC)      Home Pulmonary Medications:    Home Devices/Therapy: Home O2    Past Medical History:   Diagnosis Date    Asthma     Colon polyp     COPD (chronic obstructive pulmonary disease) (HCC)     Diabetes mellitus (HCC)      Social History     Socioeconomic History    Marital status: /Civil Union     Spouse name: None    Number of children: None    Years of education: None    Highest education level: None   Occupational History    None   Tobacco Use    Smoking status: Some Days     Current packs/day: 0.00     Types: Cigarettes     Last attempt to quit:      Years since quittin.6    Smokeless tobacco: Never   Vaping Use    Vaping status: Never Used   Substance and Sexual Activity    Alcohol use: Never    Drug use: Never    Sexual activity: None   Other Topics Concern    None   Social History Narrative    None     Social Determinants of Health     Financial Resource Strain: Low Risk  (2024)    Received from [facility]    Overall Financial Resource Strain (CARDIA)     Difficulty of Paying Living Expenses: Not hard at all   Food Insecurity: No Food Insecurity (2024)    Received from [facility]    Hunger Vital Sign     Worried About Running Out of Food in the Last Year: Never true     Ran Out of Food in the Last Year: Never true   Transportation Needs: No Transportation Needs (2024)    Received from [facility]    PRAPARE - Transportation     Lack of Transportation (Medical): No     Lack of Transportation (Non-Medical): No   Physical Activity: Not on file   Stress:  Not on file   Social Connections: Not on file   Intimate Partner Violence: Not At Risk (7/5/2024)    Received from [facility]    Humiliation, Afraid, Rape, and Kick questionnaire     Fear of Current or Ex-Partner: No     Emotionally Abused: No     Physically Abused: No     Sexually Abused: No   Housing Stability: Low Risk  (7/5/2024)    Received from [facility]    Housing Stability Vital Sign     Unable to Pay for Housing in the Last Year: No     Number of Times Moved in the Last Year: 0     Homeless in the Last Year: No       Subjective         Objective    Physical Exam:        Vitals:  Blood pressure 150/97, pulse (!) 108, temperature 98.1 °F (36.7 °C), resp. rate 17, height 5' (1.524 m), weight 57.7 kg (127 lb 3.3 oz), SpO2 96%.          Imaging and other studies: I have personally reviewed pertinent reports.      O2 Device: nc     Plan    Respiratory Plan: Mild Distress pathway        Resp Comments: pt with hx of copd, will continue with xop/atr q6

## 2024-08-18 NOTE — ASSESSMENT & PLAN NOTE
Worsening wheezing from baseline and increased shortness of breath today despite using inhalers at home  Improving shortness of breath after nebulizer treatment and IV Solu-Medrol in the ED, but still persistent wheezing  COVID/flu/RSV negative  Currently saturating in the mid 90s on 3 L, wean down as tolerated, chest x-ray WNL, age-adjusted D-dimer WNL    Check strep / legionella urine studies  Sputum culture  Check procalcitonin   Respiratory protocol  If patient is still here on Monday consider pulmonology consult  Scheduled Xopenex every 6 hours nebulizer treatment  Complete 5 days Azithromycin  IV Solu-Medrol 40 mg every 8 hours  Continue home inhalers

## 2024-08-18 NOTE — ASSESSMENT & PLAN NOTE
Lab Results   Component Value Date    HGBA1C 7.3 (H) 08/16/2024       Recent Labs     08/17/24  0623 08/17/24  1031 08/17/24  1619 08/17/24 2042   POCGLU 205* 341* 224* 324*       Blood Sugar Average: Last 72 hrs:  (P) 273.5    Blood glucose checks 4 times daily, hypoglycemia protocol  Lantus 5 units Qam  ssi

## 2024-08-18 NOTE — PROGRESS NOTES
[facility]  Progress Note  Name: Rebekah Lamb I  MRN: [id number]  Unit/Bed#: -01 I Date of Admission: 8/16/2024   Date of Service: 8/17/2024 I Hospital Day: 1    Assessment & Plan   Type 2 diabetes mellitus without complication, without long-term current use of insulin (HCC)  Assessment & Plan  Lab Results   Component Value Date    HGBA1C 7.3 (H) 08/16/2024       Recent Labs     08/17/24  0623 08/17/24  1031 08/17/24  1619 08/17/24  2042   POCGLU 205* 341* 224* 324*       Blood Sugar Average: Last 72 hrs:  (P) 273.5    Blood glucose checks 4 times daily, hypoglycemia protocol  Lantus 5 units Qam  ssi    Asthma  Assessment & Plan  This is a chronic condition  Reports compliance with inhalers  Follows up with specialist and PCP     Currently in acute exacerbation     Chronic respiratory failure (HCC)  Assessment & Plan  On 3 LPM via NC at home       Hypertension  Assessment & Plan  Presenting with elevated systolic blood pressure in the 190s, patient reports her baseline is in the 140s to 170s systolic  Will add as needed IV labetalol with parameters  Continue home losartan    * Acute exacerbation of chronic obstructive pulmonary disease (HCC)  Assessment & Plan  Worsening wheezing from baseline and increased shortness of breath today despite using inhalers at home  Improving shortness of breath after nebulizer treatment and IV Solu-Medrol in the ED, but still persistent wheezing  COVID/flu/RSV negative  Currently saturating in the mid 90s on 3 L, wean down as tolerated, chest x-ray WNL, age-adjusted D-dimer WNL    Check strep / legionella urine studies  Sputum culture  Check procalcitonin   Respiratory protocol  If patient is still here on Monday consider pulmonology consult  Scheduled Xopenex every 6 hours nebulizer treatment  Complete 5 days Azithromycin  IV Solu-Medrol 40 mg every 8 hours  Continue home inhalers               VTE Pharmacologic Prophylaxis: VTE Score: 4 Moderate Risk  (Score 3-4) - Pharmacological DVT Prophylaxis Ordered: enoxaparin (Lovenox).    Mobility:   Basic Mobility Inpatient Raw Score: 24  JH-HLM Goal: 8: Walk 250 feet or more  JH-HLM Achieved: 8: Walk 250 feet ot more  JH-HLM Goal achieved. Continue to encourage appropriate mobility.    Patient Centered Rounds: I performed bedside rounds with nursing staff today.   Discussions with Specialists or Other Care Team Provider: PTOT, CM    Education and Discussions with Family / Patient:  patient .     Total Time Spent on Date of Encounter in care of patient: 30 mins. This time was spent on one or more of the following: performing physical exam; counseling and coordination of care; obtaining or reviewing history; documenting in the medical record; reviewing/ordering tests, medications or procedures; communicating with other healthcare professionals and discussing with patient's family/caregivers.    Current Length of Stay: 1 day(s)  Current Patient Status: Inpatient   Certification Statement: The patient will continue to require additional inpatient hospital stay due to acute exacerbation of asthma   Discharge Plan:  when stable     Code Status: Level 1 - Full Code    Subjective:   Patient was seen today at bedside. Continues to report SOB, wheezing and dry cough. Slightly improved compared to hospital presentation time.     No chest pain , N/V , diarrhea or constipation, or any active urinary symptoms.       Objective:     Vitals:   Temp (24hrs), Av.2 °F (36.8 °C), Min:97.8 °F (36.6 °C), Max:98.3 °F (36.8 °C)    Temp:  [97.8 °F (36.6 °C)-98.3 °F (36.8 °C)] 97.8 °F (36.6 °C)  HR:  [100-116] 102  Resp:  [16-20] 20  BP: (149-187)/() 153/80  SpO2:  [92 %-99 %] 92 %  Body mass index is 24.84 kg/m².     Input and Output Summary (last 24 hours):     Intake/Output Summary (Last 24 hours) at 2024 020  Last data filed at 2024  Gross per 24 hour   Intake 600 ml   Output --   Net 600 ml       Physical Exam:    Physical Exam  Vitals and nursing note reviewed.   Constitutional:       General: She is not in acute distress.     Appearance: Normal appearance.   HENT:      Head: Normocephalic and atraumatic.      Mouth/Throat:      Mouth: Mucous membranes are moist.   Eyes:      Conjunctiva/sclera: Conjunctivae normal.      Pupils: Pupils are equal, round, and reactive to light.   Cardiovascular:      Rate and Rhythm: Regular rhythm. Tachycardia present.      Pulses: Normal pulses.           Carotid pulses are 2+ on the right side and 2+ on the left side.       Radial pulses are 2+ on the right side and 2+ on the left side.        Dorsalis pedis pulses are 2+ on the right side and 2+ on the left side.      Heart sounds: Normal heart sounds, S1 normal and S2 normal. No murmur heard.  Pulmonary:      Effort: No tachypnea, bradypnea or accessory muscle usage.      Breath sounds: Decreased air movement present. Wheezing and rhonchi present. No rales.   Musculoskeletal:      Right lower leg: No edema.      Left lower leg: No edema.   Neurological:      Mental Status: She is alert and oriented to person, place, and time. Mental status is at baseline.          Additional Data:     Labs:  Results from last 7 days   Lab Units 08/16/24 2125   WBC Thousand/uL 10.68*   HEMOGLOBIN g/dL 12.9   HEMATOCRIT % 41.5   PLATELETS Thousands/uL 249   SEGS PCT % 85*   LYMPHO PCT % 10*   MONO PCT % 4   EOS PCT % 0     Results from last 7 days   Lab Units 08/16/24 2125   SODIUM mmol/L 140   POTASSIUM mmol/L 4.0   CHLORIDE mmol/L 105   CO2 mmol/L 26   BUN mg/dL 12   CREATININE mg/dL 0.55*   ANION GAP mmol/L 9   CALCIUM mg/dL 9.9   ALBUMIN g/dL 4.0   TOTAL BILIRUBIN mg/dL 0.44   ALK PHOS U/L 55   ALT U/L 20   AST U/L 12*   GLUCOSE RANDOM mg/dL 210*         Results from last 7 days   Lab Units 08/17/24  2042 08/17/24  1619 08/17/24  1031 08/17/24  0623   POC GLUCOSE mg/dl 324* 224* 341* 205*     Results from last 7 days   Lab Units 08/16/24 2125    HEMOGLOBIN A1C % 7.3*           Lines/Drains:  Invasive Devices       Peripheral Intravenous Line  Duration             Peripheral IV 08/16/24 Left Antecubital 1 day                          Imaging: Reviewed radiology reports from this admission including: chest xray    Recent Cultures (last 7 days):         Last 24 Hours Medication List:   Current Facility-Administered Medications   Medication Dose Route Frequency Provider Last Rate    acetaminophen  650 mg Oral Q6H PRN Carine Bojorquez PA-C      albuterol  2 puff Inhalation Q4H PRN Carine Bojorquez PA-C      aluminum-magnesium hydroxide-simethicone  30 mL Oral Q6H PRN Carine Bojorquez PA-C      azithromycin  500 mg Oral Q24H Carine Bojorquez PA-C      budesonide-formoterol  2 puff Inhalation BID Carine Bojorquez PA-C      docusate sodium  100 mg Oral BID Carine Bojorquez PA-C      enoxaparin  40 mg Subcutaneous Daily Carine Bojorquez PA-C      esomeprazole  20 mg Oral Early Morning [facility] Nicci, DO      fluticasone  1 spray Each Nare BID Carine Bojorquez PA-C      guaiFENesin  1,200 mg Oral Q12H [facility] Osmin Metzger MD      insulin glargine  5 Units Subcutaneous QAM Osmin Metzger MD      insulin lispro  1-5 Units Subcutaneous TID AC Carine Bojorquez PA-C      insulin lispro  1-5 Units Subcutaneous HS Carine Bojorquez PA-C      ipratropium  0.5 mg Nebulization Q6H [facility] Nicci, DO      labetalol  10 mg Intravenous Q4H PRN Carine Bojorquez PA-C      levalbuterol  1.25 mg Nebulization Q6H [facility] Nicci, DO      losartan  50 mg Oral Daily Osmin Metzger MD      methylPREDNISolone sodium succinate  40 mg Intravenous Q8H Carine Bojorquez PA-C      montelukast  10 mg Oral Daily Carine Bojorquez PA-C      nicotine  1 patch Transdermal Daily Carine Bojorquez PA-C      temazepam  30 mg Oral HS PRN Carine Bojorquez PA-C          Today, Patient Was Seen By: Osmin Metzger MD    **Please Note: This note may have been constructed using a voice recognition system.**

## 2024-08-18 NOTE — ASSESSMENT & PLAN NOTE
C/c : Worsening wheezing & increased shortness of breath     COVID/flu/RSV negative  Currently saturating in the mid 90s on 3 L(baseline)  chest x-ray WNL, age-adjusted D-dimer WNL  procalcitonin neg  Scheduled Xopenex / atrovent every 6 hours nebulizer treatment  Cw po Azithromycin  IV Solu-Medrol 40 mg every 8 hours  Continue home inhalers  If no improvement in am, will consider pulmonary evaluation

## 2024-08-18 NOTE — PROGRESS NOTES
[facility]  Progress Note  Name: Rebekah Lamb I  MRN: [id number]  Unit/Bed#: -01 I Date of Admission: 8/16/2024   Date of Service: 8/18/2024 I Hospital Day: 1    Assessment & Plan   Chronic respiratory failure (HCC)  Assessment & Plan  On 3 LPM via NC at home   Currently at baseline.      * Acute exacerbation of chronic obstructive pulmonary disease (HCC)  Assessment & Plan  C/c : Worsening wheezing & increased shortness of breath     COVID/flu/RSV negative  Currently saturating in the mid 90s on 3 L(baseline)  chest x-ray WNL, age-adjusted D-dimer WNL  procalcitonin neg  Scheduled Xopenex / atrovent every 6 hours nebulizer treatment  Cw po Azithromycin  IV Solu-Medrol 40 mg every 8 hours  Continue home inhalers  If no improvement in am, will consider pulmonary evaluation    Asthma  Assessment & Plan  This is a chronic condition  Reports compliance with inhalers  Follows up with pulmonologist and PCP       Hypertension  Assessment & Plan  Presenting with elevated systolic blood pressure in the 190s, patient reports her baseline is in the 140s to 170s systolic  Continue home Cozaar   add as needed IV labetalol with parameters  Still not adequately controlled.    Type 2 diabetes mellitus without complication, without long-term current use of insulin ([facility])  Assessment & Plan  Lab Results   Component Value Date    HGBA1C 7.3 (H) 08/16/2024       Recent Labs     08/17/24  1619 08/17/24  2042 08/18/24  0630 08/18/24  1038   POCGLU 224* 324* 243* 363*         Blood Sugar Average: Last 72 hrs:  (P) 283.2490383897195025    Blood glucose checks 4 times daily with sliding scale coverage, hypoglycemia protocol  Lantus 5 units Qam               VTE Pharmacologic Prophylaxis: VTE Score: 4 lovenox    Mobility:   Basic Mobility Inpatient Raw Score: 24  JH-HLM Goal: 8: Walk 250 feet or more  JH-HLM Achieved: 8: Walk 250 feet ot more  JH-HLM Goal achieved. Continue to encourage appropriate  mobility.    Patient Centered Rounds: I performed bedside rounds with nursing staff today.     Education and Discussions with Family / Patient: Attempted to update  () via phone. Left voicemail.     Total Time Spent on Date of Encounter in care of patient: 35 mins. This time was spent on one or more of the following: performing physical exam; counseling and coordination of care; obtaining or reviewing history; documenting in the medical record; reviewing/ordering tests, medications or procedures; communicating with other healthcare professionals and discussing with patient's family/caregivers.    Current Length of Stay: 1 day(s)  Current Patient Status: Inpatient   Certification Statement: The patient will continue to require additional inpatient hospital stay due to sob  Discharge Plan: ?48-72hrs    Code Status: Level 1 - Full Code    Subjective:   reports no significant change since admission  She is on 3 L O2 which is her baseline  She is able to speak without significant respiratory distress however states that on minimal ambulation to bathroom and back she gets extremely dyspneic.  Continues to report wheezing.  No real cough.  Denies any fevers.    Objective:     Vitals:   Temp (24hrs), Av.1 °F (36.7 °C), Min:97.8 °F (36.6 °C), Max:98.3 °F (36.8 °C)    Temp:  [97.8 °F (36.6 °C)-98.3 °F (36.8 °C)] 98.1 °F (36.7 °C)  HR:  [100-116] 108  Resp:  [16-20] 17  BP: (149-168)/(80-97) 150/97  SpO2:  [92 %-97 %] 96 %  Body mass index is 24.84 kg/m².     Input and Output Summary (last 24 hours):     Intake/Output Summary (Last 24 hours) at 2024 1156  Last data filed at 2024  Gross per 24 hour   Intake 480 ml   Output --   Net 480 ml       Physical Exam:   Physical Exam  Vitals and nursing note reviewed.   Constitutional:       General: She is not in acute distress.     Appearance: She is well-developed.   HENT:      Head: Normocephalic and atraumatic.   Eyes:       Conjunctiva/sclera: Conjunctivae normal.   Cardiovascular:      Rate and Rhythm: Normal rate and regular rhythm.      Heart sounds: No murmur heard.  Pulmonary:      Effort: Pulmonary effort is normal. No respiratory distress.      Breath sounds: Wheezing present.   Abdominal:      Palpations: Abdomen is soft.      Tenderness: There is no abdominal tenderness.   Musculoskeletal:         General: No swelling.      Cervical back: Neck supple.   Skin:     General: Skin is warm and dry.      Capillary Refill: Capillary refill takes less than 2 seconds.   Neurological:      Mental Status: She is alert.   Psychiatric:         Mood and Affect: Mood normal.          Additional Data:     Labs:  Results from last 7 days   Lab Units 08/18/24  0438   WBC Thousand/uL 16.64*   HEMOGLOBIN g/dL 12.7   HEMATOCRIT % 39.4   PLATELETS Thousands/uL 243   SEGS PCT % 87*   LYMPHO PCT % 7*   MONO PCT % 4   EOS PCT % 0     Results from last 7 days   Lab Units 08/18/24  0438 08/16/24  2125   SODIUM mmol/L 140 140   POTASSIUM mmol/L 4.3 4.0   CHLORIDE mmol/L 106 105   CO2 mmol/L 29 26   BUN mg/dL 23 12   CREATININE mg/dL 0.64 0.55*   ANION GAP mmol/L 5 9   CALCIUM mg/dL 9.1 9.9   ALBUMIN g/dL  --  4.0   TOTAL BILIRUBIN mg/dL  --  0.44   ALK PHOS U/L  --  55   ALT U/L  --  20   AST U/L  --  12*   GLUCOSE RANDOM mg/dL 218* 210*         Results from last 7 days   Lab Units 08/18/24  1038 08/18/24  0630 08/17/24  2042 08/17/24  1619 08/17/24  1031 08/17/24  0623   POC GLUCOSE mg/dl 363* 243* 324* 224* 341* 205*     Results from last 7 days   Lab Units 08/16/24  2125   HEMOGLOBIN A1C % 7.3*     Results from last 7 days   Lab Units 08/18/24  0438   PROCALCITONIN ng/ml 0.09       Lines/Drains:  Invasive Devices       Peripheral Intravenous Line  Duration             Peripheral IV 08/18/24 Right Antecubital <1 day                        Recent Cultures (last 7 days):         Last 24 Hours Medication List:   Current Facility-Administered Medications    Medication Dose Route Frequency Provider Last Rate    acetaminophen  650 mg Oral Q6H PRN Carine Bojorquez PA-C      albuterol  2 puff Inhalation Q4H PRN Carine Bojorquez PA-C      aluminum-magnesium hydroxide-simethicone  30 mL Oral Q6H PRN Carine Bojorquez PA-C      azithromycin  500 mg Oral Q24H Carine Bojorquez PA-C      budesonide-formoterol  2 puff Inhalation BID Carine Bojorquez PA-C      docusate sodium  100 mg Oral BID Carine Bojorquez PA-C      enoxaparin  40 mg Subcutaneous Daily Carine Bojorquez PA-C      esomeprazole  20 mg Oral Early Morning [facility] Nicci, DO      fluticasone  1 spray Each Nare BID Carine Bojorquez PA-C      guaiFENesin  1,200 mg Oral Q12H BERTIN Osmin Metzger MD      insulin glargine  5 Units Subcutaneous QAM Osmin Metzger MD      insulin lispro  1-5 Units Subcutaneous TID AC Carine Bojorquez PA-C      insulin lispro  1-5 Units Subcutaneous HS Carine Bojorquez PA-C      ipratropium  0.5 mg Nebulization Q6H [facility] Paramjoanwarmirella, DO      labetalol  10 mg Intravenous Q4H PRN Carine Bojorquez PA-C      levalbuterol  1.25 mg Nebulization Q6H [facility] Parameswaran, DO      losartan  50 mg Oral Daily Osmin Metzger MD      methylPREDNISolone sodium succinate  40 mg Intravenous Q8H Carine Bojorquez PA-C      montelukast  10 mg Oral Daily Carine Bojorquez PA-C      nicotine  1 patch Transdermal Daily Carine Bojorquez PA-C      temazepam  30 mg Oral HS PRN Carine Bojorquez PA-C          Today, Patient Was Seen By: Valarie Dial MD    **Please Note: This note may have been constructed using a voice recognition system.**

## 2024-08-18 NOTE — ASSESSMENT & PLAN NOTE
This is a chronic condition  Reports compliance with inhalers  Follows up with specialist and PCP     Currently in acute exacerbation

## 2024-08-18 NOTE — ASSESSMENT & PLAN NOTE
Presenting with elevated systolic blood pressure in the 190s, patient reports her baseline is in the 140s to 170s systolic  Continue home Cozaar   add as needed IV labetalol with parameters  Still not adequately controlled.

## 2024-08-18 NOTE — ASSESSMENT & PLAN NOTE
This is a chronic condition  Reports compliance with inhalers  Follows up with pulmonologist and PCP

## 2024-08-19 LAB
GLUCOSE SERPL-MCNC: 182 MG/DL (ref 65–140)
GLUCOSE SERPL-MCNC: 267 MG/DL (ref 65–140)
GLUCOSE SERPL-MCNC: 368 MG/DL (ref 65–140)
GLUCOSE SERPL-MCNC: 375 MG/DL (ref 65–140)

## 2024-08-19 PROCEDURE — 94760 N-INVAS EAR/PLS OXIMETRY 1: CPT

## 2024-08-19 PROCEDURE — 99232 SBSQ HOSP IP/OBS MODERATE 35: CPT | Performed by: FAMILY MEDICINE

## 2024-08-19 PROCEDURE — 94640 AIRWAY INHALATION TREATMENT: CPT

## 2024-08-19 PROCEDURE — 94664 DEMO&/EVAL PT USE INHALER: CPT

## 2024-08-19 PROCEDURE — 99223 1ST HOSP IP/OBS HIGH 75: CPT | Performed by: INTERNAL MEDICINE

## 2024-08-19 PROCEDURE — 82948 REAGENT STRIP/BLOOD GLUCOSE: CPT

## 2024-08-19 RX ORDER — INSULIN LISPRO 100 [IU]/ML
5 INJECTION, SOLUTION INTRAVENOUS; SUBCUTANEOUS
Status: DISCONTINUED | OUTPATIENT
Start: 2024-08-19 | End: 2024-08-20

## 2024-08-19 RX ORDER — IPRATROPIUM BROMIDE AND ALBUTEROL SULFATE 2.5; .5 MG/3ML; MG/3ML
3 SOLUTION RESPIRATORY (INHALATION) EVERY 4 HOURS PRN
Status: DISCONTINUED | OUTPATIENT
Start: 2024-08-19 | End: 2024-08-21 | Stop reason: HOSPADM

## 2024-08-19 RX ADMIN — ESOMEPRAZOLE MAGNESIUM 20 MG: 20 CAPSULE, DELAYED RELEASE ORAL at 05:36

## 2024-08-19 RX ADMIN — GUAIFENESIN 1200 MG: 600 TABLET ORAL at 08:26

## 2024-08-19 RX ADMIN — ENOXAPARIN SODIUM 40 MG: 40 INJECTION SUBCUTANEOUS at 08:26

## 2024-08-19 RX ADMIN — METHYLPREDNISOLONE SODIUM SUCCINATE 40 MG: 40 INJECTION, POWDER, FOR SOLUTION INTRAMUSCULAR; INTRAVENOUS at 14:19

## 2024-08-19 RX ADMIN — METHYLPREDNISOLONE SODIUM SUCCINATE 40 MG: 40 INJECTION, POWDER, FOR SOLUTION INTRAMUSCULAR; INTRAVENOUS at 21:17

## 2024-08-19 RX ADMIN — BUDESONIDE AND FORMOTEROL FUMARATE DIHYDRATE 2 PUFF: 160; 4.5 AEROSOL RESPIRATORY (INHALATION) at 17:31

## 2024-08-19 RX ADMIN — INSULIN LISPRO 4 UNITS: 100 INJECTION, SOLUTION INTRAVENOUS; SUBCUTANEOUS at 11:37

## 2024-08-19 RX ADMIN — BUDESONIDE AND FORMOTEROL FUMARATE DIHYDRATE 2 PUFF: 160; 4.5 AEROSOL RESPIRATORY (INHALATION) at 08:32

## 2024-08-19 RX ADMIN — DOCUSATE SODIUM 100 MG: 100 CAPSULE, LIQUID FILLED ORAL at 08:26

## 2024-08-19 RX ADMIN — FLUTICASONE PROPIONATE 1 SPRAY: 50 SPRAY, METERED NASAL at 08:32

## 2024-08-19 RX ADMIN — METHYLPREDNISOLONE SODIUM SUCCINATE 40 MG: 40 INJECTION, POWDER, FOR SOLUTION INTRAMUSCULAR; INTRAVENOUS at 05:36

## 2024-08-19 RX ADMIN — IPRATROPIUM BROMIDE 0.5 MG: 0.5 SOLUTION RESPIRATORY (INHALATION) at 18:13

## 2024-08-19 RX ADMIN — LEVALBUTEROL HYDROCHLORIDE 1.25 MG: 1.25 SOLUTION RESPIRATORY (INHALATION) at 07:12

## 2024-08-19 RX ADMIN — INSULIN LISPRO 2 UNITS: 100 INJECTION, SOLUTION INTRAVENOUS; SUBCUTANEOUS at 08:26

## 2024-08-19 RX ADMIN — IPRATROPIUM BROMIDE 0.5 MG: 0.5 SOLUTION RESPIRATORY (INHALATION) at 13:06

## 2024-08-19 RX ADMIN — INSULIN GLARGINE 5 UNITS: 100 INJECTION, SOLUTION SUBCUTANEOUS at 08:26

## 2024-08-19 RX ADMIN — LEVALBUTEROL HYDROCHLORIDE 1.25 MG: 1.25 SOLUTION RESPIRATORY (INHALATION) at 18:14

## 2024-08-19 RX ADMIN — MONTELUKAST 10 MG: 10 TABLET, FILM COATED ORAL at 08:26

## 2024-08-19 RX ADMIN — DOCUSATE SODIUM 100 MG: 100 CAPSULE, LIQUID FILLED ORAL at 17:29

## 2024-08-19 RX ADMIN — LOSARTAN POTASSIUM 50 MG: 50 TABLET, FILM COATED ORAL at 08:26

## 2024-08-19 RX ADMIN — INSULIN LISPRO 5 UNITS: 100 INJECTION, SOLUTION INTRAVENOUS; SUBCUTANEOUS at 11:38

## 2024-08-19 RX ADMIN — AZITHROMYCIN 500 MG: 500 TABLET, FILM COATED ORAL at 08:26

## 2024-08-19 RX ADMIN — INSULIN LISPRO 5 UNITS: 100 INJECTION, SOLUTION INTRAVENOUS; SUBCUTANEOUS at 17:29

## 2024-08-19 RX ADMIN — INSULIN LISPRO 1 UNITS: 100 INJECTION, SOLUTION INTRAVENOUS; SUBCUTANEOUS at 17:29

## 2024-08-19 RX ADMIN — LEVALBUTEROL HYDROCHLORIDE 1.25 MG: 1.25 SOLUTION RESPIRATORY (INHALATION) at 02:12

## 2024-08-19 RX ADMIN — IPRATROPIUM BROMIDE 0.5 MG: 0.5 SOLUTION RESPIRATORY (INHALATION) at 07:12

## 2024-08-19 RX ADMIN — GUAIFENESIN 1200 MG: 600 TABLET ORAL at 21:17

## 2024-08-19 RX ADMIN — IPRATROPIUM BROMIDE 0.5 MG: 0.5 SOLUTION RESPIRATORY (INHALATION) at 02:12

## 2024-08-19 RX ADMIN — INSULIN LISPRO 3 UNITS: 100 INJECTION, SOLUTION INTRAVENOUS; SUBCUTANEOUS at 21:18

## 2024-08-19 RX ADMIN — LEVALBUTEROL HYDROCHLORIDE 1.25 MG: 1.25 SOLUTION RESPIRATORY (INHALATION) at 13:06

## 2024-08-19 NOTE — CONSULTS
Consultation - Pulmonary Medicine   Rebekah Lamb 69 y.o. female MRN: [id number]  Unit/Bed#: -01 Encounter: [id number]      Physician Requesting Consult: Valarie Dial  Reason for Consult: asthma, severe    Assessment/Plan:  Rebekah Lamb is a 69 y.o. female hx severe asthma/COPD on chronic prednisone, chronic hypoxia on 3L home O2, DM2, HTN presenting with Sob found to have acute exacerbation of asthma/COPD.     Asthma/COPD with acute exacerbation  Chronic hypoxic resp failure  Dyspnea  Cough  Severe asthma/COPD on chronic prednisone (20mg) with poorly controled symptoms presenting with acute exacerbation likely in setting of viral trigger. CT chest without significant abnormalities or PE.   Currently on home 3L    Continue IV steroids solumedrol 40 TID  Standing nebs q6hr  Continue azithromycin for 3-5 days  Continue home symbicort and singulair  Nicotine patch  Continue supplemental O2 for sat goal > 88%    Pulm will continue to follow  ______________________________________________________________________    HPI:    Rebekah Lamb is a 69 y.o. female hx severe asthma/COPD on chronic prednisone, DM2, HTN presenting with Sob found to have acute exacerbation of asthma/COPD.     Former smoker,   Severe COPD at baseline FEV1 45%. Had been on chronic prednisone 20mg for a long time, recently was planned to start on Teszpire as output ([facility] pulm) but has not yet begun this. Reports recently being on 60mg prednisone for several weeks and had persistent wheezing and cough so presented to hospital.    Worsening sympotms started last  night when her daughter came back from school with GI viral symptoms and vomited on patient while patient was cleaning her.         Historical Information   Past Medical History:   Diagnosis Date    Asthma     Colon polyp     COPD (chronic obstructive pulmonary disease) (HCC)     Diabetes mellitus (HCC)      Past Surgical History:   Procedure Laterality Date    COLONOSCOPY      AZ EXCISION  GANGLION WRIST DORSAL/VOLAR PRIMARY Left 01/10/2020    Procedure: DORSAL WRIST GANGLION CYST EXCISION; DISTAL RADIUS CYST EXCISION;  Surgeon: Bin Aguiar MD;  Location: [facility];  Service: Orthopedics    HI INCISION EXTENSOR TENDON SHEATH WRIST Left 01/10/2020    Procedure: DEQUERVAINS RELEASE;  Surgeon: Bin Aguiar MD;  Location: [facility];  Service: Orthopedics     Social History   Social History     Substance and Sexual Activity   Alcohol Use Never     Social History     Tobacco Use   Smoking Status Some Days    Current packs/day: 0.00    Types: Cigarettes    Last attempt to quit:     Years since quittin.6   Smokeless Tobacco Never       Family History:   History reviewed. No pertinent family history.    Medications:  The patient's active and prehospital medications were reviewed.  Current Facility-Administered Medications   Medication Dose Route Frequency Provider Last Rate    acetaminophen  650 mg Oral Q6H PRN Carine Bojorquez PA-C      aluminum-magnesium hydroxide-simethicone  30 mL Oral Q6H PRN Carine Bojorquez PA-C      budesonide-formoterol  2 puff Inhalation BID Carine Bojorquez PA-C      docusate sodium  100 mg Oral BID Carine Bojorquez PA-C      enoxaparin  40 mg Subcutaneous Daily Carine Bojorquez PA-C      esomeprazole  20 mg Oral Early Morning Umang Grajeda DO      fluticasone  1 spray Each Nare BID Carine Bojorquez PA-C      guaiFENesin  1,200 mg Oral Q12H [facility] Osmin eMtzger MD      insulin glargine  5 Units Subcutaneous QAM Osmin Metzger MD      insulin lispro  1-5 Units Subcutaneous TID AC Carine Bojorquez PA-C      insulin lispro  1-5 Units Subcutaneous HS Carine Bojorquez PA-C      insulin lispro  5 Units Subcutaneous TID With Meals Valarie Dial MD      ipratropium  0.5 mg Nebulization Q6H Umang Grajeda DO      ipratropium-albuterol  3 mL Nebulization Q4H PRN NEEMA Charlton      labetalol  10 mg Intravenous Q4H PRN Carine Bojorquez PA-C      levalbuterol  1.25 mg  "Nebulization Q6H Umang Grajeda DO      losartan  50 mg Oral Daily Osmin Metzger MD      methylPREDNISolone sodium succinate  40 mg Intravenous Q8H Carine Bojorquez PA-C      montelukast  10 mg Oral Daily Carine Bojorquez PA-C      nicotine  1 patch Transdermal Daily Carine Bojorquez PA-C      temazepam  30 mg Oral HS PRN Carine Bojorquez PA-C           Physical Examination:  Vitals:   Vitals:    08/19/24 0713 08/19/24 0715 08/19/24 0716 08/19/24 1308   BP:  152/87     Pulse:  105 92    Resp:  20 18    Temp:  97.7 °F (36.5 °C)     TempSrc:       SpO2: 97% 94%  97%   Weight:       Height:         Body mass index is 24.84 kg/m².  Temp  Min: 97.2 °F (36.2 °C)  Max: 98.3 °F (36.8 °C)  IBW (Ideal Body Weight): 45.5 kg    SpO2: 97 %,   SpO2 Activity: At Rest,   O2 Device: Nasal cannula 3L    Exam:   Appearance -- NAD, speaking full sentences  Heart -- RRR, no murmurs  Lungs -- b/l end expiratory wheezing  Abdomen -- soft, NTND,  Extremities -- WWP, no c/c/e  Skin -- no rash  Neuro -- A&Ox3, wnl  Psych -- no obvious depression or hallucination      Diagnostic Data:  CBC:   Results from last 7 days   Lab Units 08/18/24  0438 08/16/24  2125   WBC Thousand/uL 16.64* 10.68*   HEMOGLOBIN g/dL 12.7 12.9   HEMATOCRIT % 39.4 41.5   PLATELETS Thousands/uL 243 249       CMP:   Results from last 7 days   Lab Units 08/18/24  0438 08/16/24  2125   SODIUM mmol/L 140 140   POTASSIUM mmol/L 4.3 4.0   CHLORIDE mmol/L 106 105   CO2 mmol/L 29 26   BUN mg/dL 23 12   CREATININE mg/dL 0.64 0.55*   CALCIUM mg/dL 9.1 9.9   ALK PHOS U/L  --  55   ALT U/L  --  20   AST U/L  --  12*         Microbiology:      Results from last 7 days   Lab Units 08/18/24  0438   PROCALCITONIN ng/ml 0.09     Results from last 7 days   Lab Units 08/16/24  4020   SARS-COV-2  Negative   INFLUENZA A PCR  Negative   INFLUENZA B PCR  Negative   RSV PCR  Negative         ABG: No results found for: \"PHART\", \"NMU9SEU\", \"PO2ART\", \"TIQ4KLG\", \"S6QPRANJ\", \"BEART\", " "\"SOURCE\"    Imaging:   The pertinent imaging studies were reviewed personally on the PACS system  CT Chest: No pulmonary embolism identified within the limitations of suboptimal bolus.  A few scattered bilateral interstitial densities, nonspecific. Otherwise clear lungs.  No pleural or pericardial effusion.    Cardiac lab/EKG/telemetry/ECHO:     Results from last 7 days   Lab Units 08/16/24  2323 08/16/24  2125   HS TNI 0HR ng/L  --  6   HS TNI 2HR ng/L 6  --          Lab Results   Component Value Date    BNP 41 08/16/2024         Betty Clay MD         "

## 2024-08-19 NOTE — PLAN OF CARE
Problem: PAIN - ADULT  Goal: Verbalizes/displays adequate comfort level or baseline comfort level  Description: Interventions:  - Encourage patient to monitor pain and request assistance  - Assess pain using appropriate pain scale  - Administer analgesics based on type and severity of pain and evaluate response  - Implement non-pharmacological measures as appropriate and evaluate response  - Consider cultural and social influences on pain and pain management  - Notify physician/advanced practitioner if interventions unsuccessful or patient reports new pain  Outcome: Progressing     Problem: Knowledge Deficit  Goal: Patient/family/caregiver demonstrates understanding of disease process, treatment plan, medications, and discharge instructions  Description: Complete learning assessment and assess knowledge base.  Interventions:  - Provide teaching at level of understanding  - Provide teaching via preferred learning methods  Outcome: Progressing     Problem: RESPIRATORY - ADULT  Goal: Achieves optimal ventilation and oxygenation  Description: INTERVENTIONS:  - Assess for changes in respiratory status  - Assess for changes in mentation and behavior  - Position to facilitate oxygenation and minimize respiratory effort  - Oxygen administered by appropriate delivery if ordered  - Initiate smoking cessation education as indicated  - Encourage broncho-pulmonary hygiene including cough, deep breathe, Incentive Spirometry  - Assess the need for suctioning and aspirate as needed  - Assess and instruct to report SOB or any respiratory difficulty  - Respiratory Therapy support as indicated  Outcome: Progressing     Problem: Nutrition/Hydration-ADULT  Goal: Nutrient/Hydration intake appropriate for improving, restoring or maintaining nutritional needs  Description: Monitor and assess patient's nutrition/hydration status for malnutrition. Collaborate with interdisciplinary team and initiate plan and interventions as ordered.   Monitor patient's weight and dietary intake as ordered or per policy. Utilize nutrition screening tool and intervene as necessary. Determine patient's food preferences and provide high-protein, high-caloric foods as appropriate.     INTERVENTIONS:  - Monitor oral intake, urinary output, labs, and treatment plans  - Assess nutrition and hydration status and recommend course of action  - Evaluate amount of meals eaten  - Assist patient with eating if necessary   - Allow adequate time for meals  - Recommend/ encourage appropriate diets, oral nutritional supplements, and vitamin/mineral supplements  - Order, calculate, and assess calorie counts as needed  - Recommend, monitor, and adjust tube feedings and TPN/PPN based on assessed needs  - Assess need for intravenous fluids  - Provide specific nutrition/hydration education as appropriate  - Include patient/family/caregiver in decisions related to nutrition  Outcome: Progressing     Problem: METABOLIC, FLUID AND ELECTROLYTES - ADULT  Goal: Glucose maintained within target range  Description: INTERVENTIONS:  - Monitor Blood Glucose as ordered  - Assess for signs and symptoms of hyperglycemia and hypoglycemia  - Administer ordered medications to maintain glucose within target range  - Assess nutritional intake and initiate nutrition service referral as needed  Outcome: Progressing

## 2024-08-19 NOTE — ASSESSMENT & PLAN NOTE
Presenting with elevated systolic blood pressure in the 190s, patient reports her baseline is in the 140s to 170s systolic  Continue home Cozaar  as needed IV labetalol with parameters  Still not adequately controlled.

## 2024-08-19 NOTE — RESPIRATORY THERAPY NOTE
RT Protocol Note  Rebekah Lamb 69 y.o. female MRN: [id number]  Unit/Bed#: -01 Encounter: [id number]    Assessment    Principal Problem:    Acute exacerbation of chronic obstructive pulmonary disease (HCC)  Active Problems:    Hypertension    Chronic respiratory failure (HCC)    Asthma    Type 2 diabetes mellitus without complication, without long-term current use of insulin (HCC)      Home Pulmonary Medications:    Home Devices/Therapy: Home O2    Past Medical History:   Diagnosis Date    Asthma     Colon polyp     COPD (chronic obstructive pulmonary disease) (HCC)     Diabetes mellitus (HCC)      Social History     Socioeconomic History    Marital status: /Civil Union     Spouse name: None    Number of children: None    Years of education: None    Highest education level: None   Occupational History    None   Tobacco Use    Smoking status: Some Days     Current packs/day: 0.00     Types: Cigarettes     Last attempt to quit:      Years since quittin.6    Smokeless tobacco: Never   Vaping Use    Vaping status: Never Used   Substance and Sexual Activity    Alcohol use: Never    Drug use: Never    Sexual activity: None   Other Topics Concern    None   Social History Narrative    None     Social Determinants of Health     Financial Resource Strain: Low Risk  (2024)    Received from [facility]    Overall Financial Resource Strain (CARDIA)     Difficulty of Paying Living Expenses: Not hard at all   Food Insecurity: No Food Insecurity (2024)    Hunger Vital Sign     Worried About Running Out of Food in the Last Year: Never true     Ran Out of Food in the Last Year: Never true   Transportation Needs: No Transportation Needs (2024)    PRAPARE - Transportation     Lack of Transportation (Medical): No     Lack of Transportation (Non-Medical): No   Physical Activity: Not on file   Stress: Not on file   Social Connections: Not on file   Intimate Partner Violence: Not At Risk  (7/5/2024)    Received from [facility]    Humiliation, Afraid, Rape, and Kick questionnaire     Fear of Current or Ex-Partner: No     Emotionally Abused: No     Physically Abused: No     Sexually Abused: No   Housing Stability: Unknown (8/19/2024)    Housing Stability Vital Sign     Unable to Pay for Housing in the Last Year: No     Number of Times Moved in the Last Year: Not on file     Homeless in the Last Year: No       Subjective         Objective    Physical Exam:   Assessment Type: During-treatment  General Appearance: Awake, Alert  Respiratory Pattern: Normal  Chest Assessment: Chest expansion symmetrical  Bilateral Breath Sounds: Diminished, Expiratory wheezes, Scattered, Crackles  Cough: Non-productive    Vitals:  Blood pressure 152/87, pulse (P) 92, temperature 97.7 °F (36.5 °C), resp. rate (P) 18, height 5' (1.524 m), weight 57.7 kg (127 lb 3.3 oz), SpO2 94%.          Imaging and other studies: I have personally reviewed pertinent reports.      O2 Device: nc     Plan    Respiratory Plan: Mild Distress pathway        Resp Comments: Tx given

## 2024-08-19 NOTE — PROGRESS NOTES
[facility]  Progress Note  Name: Rebekah Lamb I  MRN: [id number]  Unit/Bed#: -01 I Date of Admission: 8/16/2024   Date of Service: 8/19/2024 I Hospital Day: 2    Assessment & Plan   Chronic respiratory failure (HCC)  Assessment & Plan  On 3 LPM via NC at home   Currently at baseline.      * Acute exacerbation of chronic obstructive pulmonary disease (HCC)  Assessment & Plan  C/c : Worsening wheezing & increased shortness of breath     COVID/flu/RSV negative  Currently saturating in the mid 90s on 3 L(baseline)  chest x-ray WNL, age-adjusted D-dimer WNL  procalcitonin neg  Scheduled Xopenex / atrovent every 6 hours nebulizer treatment  Cw po Azithromycin(day3/3)  IV Solu-Medrol 40 mg every 8 hours  Continue home inhalers  Consulted pulmonology given no clinical improvement despite treatment plan above    Asthma  Assessment & Plan  This is a chronic condition  Reports compliance with inhalers  Follows up with pulmonologist and PCP       Hypertension  Assessment & Plan  Presenting with elevated systolic blood pressure in the 190s, patient reports her baseline is in the 140s to 170s systolic  Continue home Cozaar  as needed IV labetalol with parameters  Still not adequately controlled.    Type 2 diabetes mellitus without complication, without long-term current use of insulin ([facility])  Assessment & Plan  Lab Results   Component Value Date    HGBA1C 7.3 (H) 08/16/2024       Recent Labs     08/18/24  1617 08/18/24  2042 08/19/24  0619 08/19/24  1037   POCGLU 222* 302* 267* 375*         Blood Sugar Average: Last 72 hrs:  (P) 286.6    Blood glucose checks 4 times daily with sliding scale coverage, hypoglycemia protocol  Lantus 5 units Qam, add humalog with meals.  Holding trulicity               VTE Pharmacologic Prophylaxis: VTE Score: 4 lovenox    Mobility:   Basic Mobility Inpatient Raw Score: 21  JH-HLM Goal: 6: Walk 10 steps or more  JH-HLM Achieved: 7: Walk 25 feet or more  JH-HLM Goal  Detail Level: Detailed achieved. Continue to encourage appropriate mobility.    Patient Centered Rounds: I performed bedside rounds with nursing staff today.   Discussions with Specialists or Other Care Team Provider: cm    Education and Discussions with Family / Patient: patient    Total Time Spent on Date of Encounter in care of patient: 35 mins. This time was spent on one or more of the following: performing physical exam; counseling and coordination of care; obtaining or reviewing history; documenting in the medical record; reviewing/ordering tests, medications or procedures; communicating with other healthcare professionals and discussing with patient's family/caregivers.    Current Length of Stay: 2 day(s)  Current Patient Status: Inpatient   Certification Statement: The patient will continue to require additional inpatient hospital stay due to pulm eval  Discharge Plan: ?48hrs    Code Status: Level 1 - Full Code    Subjective:   Feels no change  Reports feeling winded with minimal movement . Also still wheezing  O2 needs at baseline.    Objective:     Vitals:   Temp (24hrs), Av.8 °F (36.6 °C), Min:97.7 °F (36.5 °C), Max:97.9 °F (36.6 °C)    Temp:  [97.7 °F (36.5 °C)-97.9 °F (36.6 °C)] 97.7 °F (36.5 °C)  HR:  [] 92  Resp:  [18-20] 18  BP: (150-185)/(86-93) 152/87  SpO2:  [92 %-97 %] 94 %  Body mass index is 24.84 kg/m².     Input and Output Summary (last 24 hours):     Intake/Output Summary (Last 24 hours) at 2024 1121  Last data filed at 2024  Gross per 24 hour   Intake 480 ml   Output --   Net 480 ml       Physical Exam:   Physical Exam  Vitals and nursing note reviewed.   Constitutional:       General: She is not in acute distress.     Appearance: She is well-developed.   HENT:      Head: Normocephalic and atraumatic.      Mouth/Throat:      Mouth: Mucous membranes are moist.      Pharynx: Oropharynx is clear.   Cardiovascular:      Rate and Rhythm: Normal rate and regular rhythm.      Heart sounds: No  murmur heard.  Pulmonary:      Effort: Pulmonary effort is normal. No respiratory distress.      Breath sounds: Wheezing present.   Abdominal:      Palpations: Abdomen is soft.      Tenderness: There is no abdominal tenderness.   Musculoskeletal:         General: No swelling.      Cervical back: Neck supple.   Skin:     General: Skin is warm and dry.   Neurological:      Mental Status: She is alert.   Psychiatric:         Mood and Affect: Mood normal.          Additional Data:     Labs:  Results from last 7 days   Lab Units 08/18/24  0438   WBC Thousand/uL 16.64*   HEMOGLOBIN g/dL 12.7   HEMATOCRIT % 39.4   PLATELETS Thousands/uL 243   SEGS PCT % 87*   LYMPHO PCT % 7*   MONO PCT % 4   EOS PCT % 0     Results from last 7 days   Lab Units 08/18/24  0438 08/16/24  2125   SODIUM mmol/L 140 140   POTASSIUM mmol/L 4.3 4.0   CHLORIDE mmol/L 106 105   CO2 mmol/L 29 26   BUN mg/dL 23 12   CREATININE mg/dL 0.64 0.55*   ANION GAP mmol/L 5 9   CALCIUM mg/dL 9.1 9.9   ALBUMIN g/dL  --  4.0   TOTAL BILIRUBIN mg/dL  --  0.44   ALK PHOS U/L  --  55   ALT U/L  --  20   AST U/L  --  12*   GLUCOSE RANDOM mg/dL 218* 210*         Results from last 7 days   Lab Units 08/19/24  1037 08/19/24  0619 08/18/24  2042 08/18/24  1617 08/18/24  1038 08/18/24  0630 08/17/24  2042 08/17/24  1619 08/17/24  1031 08/17/24  0623   POC GLUCOSE mg/dl 375* 267* 302* 222* 363* 243* 324* 224* 341* 205*     Results from last 7 days   Lab Units 08/16/24  2125   HEMOGLOBIN A1C % 7.3*     Results from last 7 days   Lab Units 08/18/24  0438   PROCALCITONIN ng/ml 0.09       Lines/Drains:  Invasive Devices       Peripheral Intravenous Line  Duration             Peripheral IV 08/18/24 Right Antecubital 1 day                        Recent Cultures (last 7 days):         Last 24 Hours Medication List:   Current Facility-Administered Medications   Medication Dose Route Frequency Provider Last Rate    acetaminophen  650 mg Oral Q6H PRN Carine Bojorquez PA-C       albuterol  2 puff Inhalation Q4H PRN Carine Bojorquez PA-C      aluminum-magnesium hydroxide-simethicone  30 mL Oral Q6H PRN Carine Bojorquez PA-C      budesonide-formoterol  2 puff Inhalation BID Carine Bojorquez PA-C      docusate sodium  100 mg Oral BID Carine Bojorquez PA-C      enoxaparin  40 mg Subcutaneous Daily Carine Bojorquez PA-C      esomeprazole  20 mg Oral Early Morning Umang Nicci, DO      fluticasone  1 spray Each Nare BID Carine Bojorquez PA-C      guaiFENesin  1,200 mg Oral Q12H BERTIN Osmin Metzger MD      insulin glargine  5 Units Subcutaneous QAM Osmin Metzger MD      insulin lispro  1-5 Units Subcutaneous TID AC Carine Bojorquez PA-C      insulin lispro  1-5 Units Subcutaneous HS Carine Bojorquez PA-C      insulin lispro  5 Units Subcutaneous TID With Meals Valarie Dial MD      ipratropium  0.5 mg Nebulization Q6H [facility] Nicci, DO      labetalol  10 mg Intravenous Q4H PRN Carine Bojorquez PA-C      levalbuterol  1.25 mg Nebulization Q6H [facility] Nicci, DO      losartan  50 mg Oral Daily Osmin Metzger MD      methylPREDNISolone sodium succinate  40 mg Intravenous Q8H Carine Bojorquez PA-C      montelukast  10 mg Oral Daily Carine Bojorquez PA-C      nicotine  1 patch Transdermal Daily Carine Bojorquez PA-C      temazepam  30 mg Oral HS PRN Carine Bojorquez PA-C          Today, Patient Was Seen By: Valarie Dial MD    **Please Note: This note may have been constructed using a voice recognition system.**

## 2024-08-19 NOTE — ASSESSMENT & PLAN NOTE
C/c : Worsening wheezing & increased shortness of breath     COVID/flu/RSV negative  Currently saturating in the mid 90s on 3 L(baseline)  chest x-ray WNL, age-adjusted D-dimer WNL  procalcitonin neg  Scheduled Xopenex / atrovent every 6 hours nebulizer treatment  Cw po Azithromycin(day3/3)  IV Solu-Medrol 40 mg every 8 hours  Continue home inhalers  Consulted pulmonology given no clinical improvement despite treatment plan above

## 2024-08-19 NOTE — ASSESSMENT & PLAN NOTE
Lab Results   Component Value Date    HGBA1C 7.3 (H) 08/16/2024       Recent Labs     08/18/24  1617 08/18/24  2042 08/19/24  0619 08/19/24  1037   POCGLU 222* 302* 267* 375*         Blood Sugar Average: Last 72 hrs:  (P) 286.6    Blood glucose checks 4 times daily with sliding scale coverage, hypoglycemia protocol  Lantus 5 units Qam, add humalog with meals.  Holding trulicity

## 2024-08-19 NOTE — CASE MANAGEMENT
Case Management Assessment & Discharge Planning Note    Patient name Rebekah Lamb  Location /-01 MRN [id number]  : 1955 Date 2024       Current Admission Date: 2024  Current Admission Diagnosis:Acute exacerbation of chronic obstructive pulmonary disease (HCC)   Patient Active Problem List    Diagnosis Date Noted Date Diagnosed    Chronic respiratory failure (HCC) 2023     Tobacco abuse 2023     NAFL (nonalcoholic fatty liver) 02/15/2021     De Quervain's tenosynovitis, left 2019     Hyperlipidemia associated with type 2 diabetes mellitus  (HCC) 2019     Vitamin D deficiency 2019     Anxiety 2014    Acute exacerbation of chronic obstructive pulmonary disease (HCC) 2014    Hypertension 2014    Asthma 2014     Type 2 diabetes mellitus without complication, without long-term current use of insulin (HCC) 2014       LOS (days): 2  Geometric Mean LOS (GMLOS) (days): 2.7  Days to GMLOS:0.9     OBJECTIVE:    Risk of Unplanned Readmission Score: 12.9         Current admission status: Inpatient       Preferred Pharmacy:   [id number] - [facility] [place] - [address]  [address]  [facility] [place] [address]  Phone: [phone] Fax: [phone]    Primary Care Provider: Kenneth Wang MD    Primary Insurance: MEDICARE  Secondary Insurance: BLUE CROSS    ASSESSMENT:  Active Health Care Proxies       Ori Lamb Health Care Representative - Spouse   Primary Phone: [phone] (Mobile)                 Advance Directives  Does patient have a Health Care POA?: Yes  Does patient have Advance Directives?: Yes  Advance Directives: Power of  for health care  Primary Contact: Ori Lamb (Spouse)  [phone]         Readmission Root Cause  30 Day Readmission: No    Patient Information  Admitted from:: Home  Mental Status: Alert  During Assessment patient was accompanied by: Not accompanied  during assessment  Assessment information provided by:: Patient  Primary Caregiver: Self  Support Systems: Self, Spouse/significant other  County of Residence: [place]  What city do you live in?: [place]  Home entry access options. Select all that apply.: Stairs  Number of steps to enter home.: 4  Do the steps have railings?: Yes  Type of Current Residence: 2 story home  Upon entering residence, is there a bedroom on the main floor (no further steps)?: No  A bedroom is located on the following floor levels of residence (select all that apply):: 2nd Floor  Upon entering residence, is there a bathroom on the main floor (no further steps)?: No  Indicate which floors of current residence have a bathroom (select all the apply):: 2nd Floor  Number of steps to 2nd floor from main floor: One Flight  Living Arrangements: Lives w/ Spouse/significant other, Lives w/ Daughter  Is patient a ?: No    Activities of Daily Living Prior to Admission  Functional Status: Independent  Completes ADLs independently?: Yes  Ambulates independently?: Yes  Does patient use assisted devices?: No  Does patient currently own DME?: No  Does patient have a history of Outpatient Therapy (PT/OT)?: No  Does the patient have a history of Short-Term Rehab?: No  Does patient have a history of HHC?: No  Does patient currently have HHC?: No         Patient Information Continued  Income Source: SSI/SSD  Does patient have prescription coverage?: Yes  Does patient receive dialysis treatments?: No  Does patient have a history of substance abuse?: No         Means of Transportation  [facility]      Social Determinants of Health (SDOH)      Flowsheet Row Most Recent Value   Housing Stability    In the last 12 months, was there a time when you were not able to pay the mortgage or rent on time? N   In the past 12 months, how many times have you moved where you were living? 0   At any time in the past 12 months, were  you homeless or living in a shelter (including now)? N   Transportation Needs    In the past 12 months, has lack of transportation kept you from medical appointments or from getting medications? no   In the past 12 months, has lack of transportation kept you from meetings, work, or from getting things needed for daily living? No   Food Insecurity    Within the past 12 months, you worried that your food would run out before you got the money to buy more. Never true   Within the past 12 months, the food you bought just didn't last and you didn't have money to get more. Never true   Utilities    In the past 12 months has the electric, gas, oil, or water company threatened to shut off services in your home? No            DISCHARGE DETAILS:    Discharge planning discussed with:: Pt at bedside  [place] of Choice: Yes  Comments - Freedom of Choice: CM met with pt at bedside and introduced self/role. Pt is alert and oriented x3 able to make her needs known and encouraged to do so. FOC discussed at length. Pt has an AMPAC score of 21. Pt has no CM needs. CM continues to follow and assist with pt dc plans.  CM contacted family/caregiver?: No- see comments (pt will update her family)  Were Treatment Team discharge recommendations reviewed with patient/caregiver?: Yes  Did patient/caregiver verbalize understanding of patient care needs?: Yes  Were patient/caregiver advised of the risks associated with not following Treatment Team discharge recommendations?: Yes    Contacts  Reason/Outcome: Continuity of Care, Emergency Contact, Referral, Discharge Planning    Requested Home Health Care         Is the patient interested in HHC at discharge?: No    DME Referral Provided  Referral made for DME?: No    Other Referral/Resources/Interventions Provided:  Interventions: None Indicated    Would you like to participate in our [facility] service program?  : No - Declined    Treatment Team Recommendation: Home  Discharge Destination  Plan:: Home  Transport at Discharge : [facility] Ambulance  Dispatcher Contacted: No

## 2024-08-20 LAB
ANION GAP SERPL CALCULATED.3IONS-SCNC: 7 MMOL/L (ref 4–13)
ATRIAL RATE: 109 BPM
BASOPHILS # BLD MANUAL: 0 THOUSAND/UL (ref 0–0.1)
BASOPHILS NFR MAR MANUAL: 0 % (ref 0–1)
BUN SERPL-MCNC: 22 MG/DL (ref 5–25)
CALCIUM SERPL-MCNC: 8.4 MG/DL (ref 8.4–10.2)
CHLORIDE SERPL-SCNC: 103 MMOL/L (ref 96–108)
CO2 SERPL-SCNC: 28 MMOL/L (ref 21–32)
CREAT SERPL-MCNC: 0.61 MG/DL (ref 0.6–1.3)
EOSINOPHIL # BLD MANUAL: 0 THOUSAND/UL (ref 0–0.4)
EOSINOPHIL NFR BLD MANUAL: 0 % (ref 0–6)
ERYTHROCYTE [DISTWIDTH] IN BLOOD BY AUTOMATED COUNT: 14.2 % (ref 11.6–15.1)
GFR SERPL CREATININE-BSD FRML MDRD: 92 ML/MIN/1.73SQ M
GLUCOSE SERPL-MCNC: 219 MG/DL (ref 65–140)
GLUCOSE SERPL-MCNC: 240 MG/DL (ref 65–140)
GLUCOSE SERPL-MCNC: 385 MG/DL (ref 65–140)
GLUCOSE SERPL-MCNC: 423 MG/DL (ref 65–140)
GLUCOSE SERPL-MCNC: 473 MG/DL (ref 65–140)
HCT VFR BLD AUTO: 39.6 % (ref 34.8–46.1)
HGB BLD-MCNC: 12.4 G/DL (ref 11.5–15.4)
LYMPHOCYTES # BLD AUTO: 0.87 THOUSAND/UL (ref 0.6–4.47)
LYMPHOCYTES # BLD AUTO: 5 % (ref 14–44)
MCH RBC QN AUTO: 28.4 PG (ref 26.8–34.3)
MCHC RBC AUTO-ENTMCNC: 31.3 G/DL (ref 31.4–37.4)
MCV RBC AUTO: 91 FL (ref 82–98)
MONOCYTES # BLD AUTO: 0.72 THOUSAND/UL (ref 0–1.22)
MONOCYTES NFR BLD: 5 % (ref 4–12)
MYELOCYTE ABSOLUTE CT: 0.43 THOUSAND/UL (ref 0–0.1)
MYELOCYTES NFR BLD MANUAL: 3 % (ref 0–1)
NEUTROPHILS # BLD MANUAL: 12.46 THOUSAND/UL (ref 1.85–7.62)
NEUTS BAND NFR BLD MANUAL: 2 % (ref 0–8)
NEUTS SEG NFR BLD AUTO: 84 % (ref 43–75)
P AXIS: 74 DEGREES
PLATELET # BLD AUTO: 256 THOUSANDS/UL (ref 149–390)
PLATELET BLD QL SMEAR: ADEQUATE
PMV BLD AUTO: 9.4 FL (ref 8.9–12.7)
POTASSIUM SERPL-SCNC: 3.7 MMOL/L (ref 3.5–5.3)
PR INTERVAL: 136 MS
QRS AXIS: 77 DEGREES
QRSD INTERVAL: 82 MS
QT INTERVAL: 336 MS
QTC INTERVAL: 452 MS
RBC # BLD AUTO: 4.36 MILLION/UL (ref 3.81–5.12)
RBC MORPH BLD: NORMAL
SODIUM SERPL-SCNC: 138 MMOL/L (ref 135–147)
T WAVE AXIS: 58 DEGREES
VARIANT LYMPHS # BLD AUTO: 1 %
VENTRICULAR RATE: 109 BPM
WBC # BLD AUTO: 14.49 THOUSAND/UL (ref 4.31–10.16)

## 2024-08-20 PROCEDURE — 94760 N-INVAS EAR/PLS OXIMETRY 1: CPT

## 2024-08-20 PROCEDURE — 82948 REAGENT STRIP/BLOOD GLUCOSE: CPT

## 2024-08-20 PROCEDURE — 80048 BASIC METABOLIC PNL TOTAL CA: CPT | Performed by: FAMILY MEDICINE

## 2024-08-20 PROCEDURE — 99232 SBSQ HOSP IP/OBS MODERATE 35: CPT | Performed by: INTERNAL MEDICINE

## 2024-08-20 PROCEDURE — 85007 BL SMEAR W/DIFF WBC COUNT: CPT | Performed by: FAMILY MEDICINE

## 2024-08-20 PROCEDURE — 94664 DEMO&/EVAL PT USE INHALER: CPT

## 2024-08-20 PROCEDURE — 85027 COMPLETE CBC AUTOMATED: CPT | Performed by: FAMILY MEDICINE

## 2024-08-20 PROCEDURE — 94640 AIRWAY INHALATION TREATMENT: CPT

## 2024-08-20 PROCEDURE — 93010 ELECTROCARDIOGRAM REPORT: CPT | Performed by: INTERNAL MEDICINE

## 2024-08-20 PROCEDURE — 99232 SBSQ HOSP IP/OBS MODERATE 35: CPT | Performed by: STUDENT IN AN ORGANIZED HEALTH CARE EDUCATION/TRAINING PROGRAM

## 2024-08-20 RX ORDER — INSULIN GLARGINE 100 [IU]/ML
7 INJECTION, SOLUTION SUBCUTANEOUS EVERY MORNING
Status: DISCONTINUED | OUTPATIENT
Start: 2024-08-21 | End: 2024-08-21 | Stop reason: HOSPADM

## 2024-08-20 RX ORDER — INSULIN LISPRO 100 [IU]/ML
7 INJECTION, SOLUTION INTRAVENOUS; SUBCUTANEOUS
Status: DISCONTINUED | OUTPATIENT
Start: 2024-08-20 | End: 2024-08-21 | Stop reason: HOSPADM

## 2024-08-20 RX ORDER — INSULIN LISPRO 100 [IU]/ML
5 INJECTION, SOLUTION INTRAVENOUS; SUBCUTANEOUS ONCE
Status: COMPLETED | OUTPATIENT
Start: 2024-08-20 | End: 2024-08-20

## 2024-08-20 RX ORDER — METHYLPREDNISOLONE SODIUM SUCCINATE 40 MG/ML
40 INJECTION, POWDER, LYOPHILIZED, FOR SOLUTION INTRAMUSCULAR; INTRAVENOUS EVERY 12 HOURS SCHEDULED
Status: DISCONTINUED | OUTPATIENT
Start: 2024-08-20 | End: 2024-08-21 | Stop reason: HOSPADM

## 2024-08-20 RX ADMIN — INSULIN LISPRO 5 UNITS: 100 INJECTION, SOLUTION INTRAVENOUS; SUBCUTANEOUS at 06:27

## 2024-08-20 RX ADMIN — IPRATROPIUM BROMIDE 0.5 MG: 0.5 SOLUTION RESPIRATORY (INHALATION) at 01:51

## 2024-08-20 RX ADMIN — INSULIN LISPRO 2 UNITS: 100 INJECTION, SOLUTION INTRAVENOUS; SUBCUTANEOUS at 16:15

## 2024-08-20 RX ADMIN — MONTELUKAST 10 MG: 10 TABLET, FILM COATED ORAL at 08:14

## 2024-08-20 RX ADMIN — INSULIN LISPRO 4 UNITS: 100 INJECTION, SOLUTION INTRAVENOUS; SUBCUTANEOUS at 07:54

## 2024-08-20 RX ADMIN — LEVALBUTEROL HYDROCHLORIDE 1.25 MG: 1.25 SOLUTION RESPIRATORY (INHALATION) at 13:29

## 2024-08-20 RX ADMIN — LEVALBUTEROL HYDROCHLORIDE 1.25 MG: 1.25 SOLUTION RESPIRATORY (INHALATION) at 01:51

## 2024-08-20 RX ADMIN — IPRATROPIUM BROMIDE 0.5 MG: 0.5 SOLUTION RESPIRATORY (INHALATION) at 07:13

## 2024-08-20 RX ADMIN — GUAIFENESIN 1200 MG: 600 TABLET ORAL at 21:02

## 2024-08-20 RX ADMIN — INSULIN LISPRO 4 UNITS: 100 INJECTION, SOLUTION INTRAVENOUS; SUBCUTANEOUS at 10:59

## 2024-08-20 RX ADMIN — INSULIN LISPRO 1 UNITS: 100 INJECTION, SOLUTION INTRAVENOUS; SUBCUTANEOUS at 21:01

## 2024-08-20 RX ADMIN — LOSARTAN POTASSIUM 50 MG: 50 TABLET, FILM COATED ORAL at 08:14

## 2024-08-20 RX ADMIN — LEVALBUTEROL HYDROCHLORIDE 1.25 MG: 1.25 SOLUTION RESPIRATORY (INHALATION) at 07:13

## 2024-08-20 RX ADMIN — IPRATROPIUM BROMIDE 0.5 MG: 0.5 SOLUTION RESPIRATORY (INHALATION) at 18:17

## 2024-08-20 RX ADMIN — INSULIN LISPRO 7 UNITS: 100 INJECTION, SOLUTION INTRAVENOUS; SUBCUTANEOUS at 16:16

## 2024-08-20 RX ADMIN — INSULIN LISPRO 5 UNITS: 100 INJECTION, SOLUTION INTRAVENOUS; SUBCUTANEOUS at 11:00

## 2024-08-20 RX ADMIN — IPRATROPIUM BROMIDE 0.5 MG: 0.5 SOLUTION RESPIRATORY (INHALATION) at 13:29

## 2024-08-20 RX ADMIN — INSULIN LISPRO 5 UNITS: 100 INJECTION, SOLUTION INTRAVENOUS; SUBCUTANEOUS at 07:54

## 2024-08-20 RX ADMIN — BUDESONIDE AND FORMOTEROL FUMARATE DIHYDRATE 2 PUFF: 160; 4.5 AEROSOL RESPIRATORY (INHALATION) at 08:16

## 2024-08-20 RX ADMIN — LEVALBUTEROL HYDROCHLORIDE 1.25 MG: 1.25 SOLUTION RESPIRATORY (INHALATION) at 18:17

## 2024-08-20 RX ADMIN — ESOMEPRAZOLE MAGNESIUM 20 MG: 20 CAPSULE, DELAYED RELEASE ORAL at 05:33

## 2024-08-20 RX ADMIN — INSULIN GLARGINE 5 UNITS: 100 INJECTION, SOLUTION SUBCUTANEOUS at 08:15

## 2024-08-20 RX ADMIN — METHYLPREDNISOLONE SODIUM SUCCINATE 40 MG: 40 INJECTION, POWDER, FOR SOLUTION INTRAMUSCULAR; INTRAVENOUS at 05:33

## 2024-08-20 RX ADMIN — METHYLPREDNISOLONE SODIUM SUCCINATE 40 MG: 40 INJECTION, POWDER, FOR SOLUTION INTRAMUSCULAR; INTRAVENOUS at 21:02

## 2024-08-20 RX ADMIN — ENOXAPARIN SODIUM 40 MG: 40 INJECTION SUBCUTANEOUS at 08:14

## 2024-08-20 RX ADMIN — GUAIFENESIN 1200 MG: 600 TABLET ORAL at 08:14

## 2024-08-20 NOTE — RESPIRATORY THERAPY NOTE
RT Protocol Note  Rebekah Lamb 69 y.o. female MRN: [id number]  Unit/Bed#: -01 Encounter: [id number]    Assessment    Principal Problem:    Acute exacerbation of chronic obstructive pulmonary disease (HCC)  Active Problems:    Hypertension    Chronic respiratory failure (HCC)    Asthma    Type 2 diabetes mellitus without complication, without long-term current use of insulin (HCC)      Home Pulmonary Medications:    Home Devices/Therapy: Home O2    Past Medical History:   Diagnosis Date    Asthma     Colon polyp     COPD (chronic obstructive pulmonary disease) (HCC)     Diabetes mellitus (HCC)      Social History     Socioeconomic History    Marital status: /Civil Union     Spouse name: None    Number of children: None    Years of education: None    Highest education level: None   Occupational History    None   Tobacco Use    Smoking status: Some Days     Current packs/day: 0.00     Types: Cigarettes     Last attempt to quit:      Years since quittin.6    Smokeless tobacco: Never   Vaping Use    Vaping status: Never Used   Substance and Sexual Activity    Alcohol use: Never    Drug use: Never    Sexual activity: None   Other Topics Concern    None   Social History Narrative    None     Social Determinants of Health     Financial Resource Strain: Low Risk  (2024)    Received from [facility]    Overall Financial Resource Strain (CARDIA)     Difficulty of Paying Living Expenses: Not hard at all   Food Insecurity: No Food Insecurity (2024)    Hunger Vital Sign     Worried About Running Out of Food in the Last Year: Never true     Ran Out of Food in the Last Year: Never true   Transportation Needs: No Transportation Needs (2024)    PRAPARE - Transportation     Lack of Transportation (Medical): No     Lack of Transportation (Non-Medical): No   Physical Activity: Not on file   Stress: Not on file   Social Connections: Not on file   Intimate Partner Violence: Not At Risk  (7/5/2024)    Received from [facility]    Humiliation, Afraid, Rape, and Kick questionnaire     Fear of Current or Ex-Partner: No     Emotionally Abused: No     Physically Abused: No     Sexually Abused: No   Housing Stability: Low Risk  (8/19/2024)    Housing Stability Vital Sign     Unable to Pay for Housing in the Last Year: No     Number of Times Moved in the Last Year: 0     Homeless in the Last Year: No       Subjective         Objective    Physical Exam:   Assessment Type: (P) During-treatment  General Appearance: (P) Awake, Alert  Respiratory Pattern: (P) Normal  Chest Assessment: (P) Chest expansion symmetrical  Bilateral Breath Sounds: (P) Diminished, Expiratory wheezes  Cough: (P) None  O2 Device: (P) NC    Vitals:  Blood pressure 118/95, pulse (!) 116, temperature 97.7 °F (36.5 °C), temperature source Oral, resp. rate (P) 18, height 5' (1.524 m), weight 57.7 kg (127 lb 3.3 oz), SpO2 98%.          Imaging and other studies: I have personally reviewed pertinent reports.      O2 Device: (P) NC     Plan    Respiratory Plan: Mild Distress pathway        Resp Comments: (P) cont w/ current therapy.

## 2024-08-20 NOTE — PROGRESS NOTES
[facility]  Progress Note  Name: Rebekah Lamb I  MRN: [id number]  Unit/Bed#: -01 I Date of Admission: 8/16/2024   Date of Service: 8/20/2024 I Hospital Day: 3    Assessment & Plan   * Acute exacerbation of chronic obstructive pulmonary disease (HCC)  Assessment & Plan  C/c : Worsening wheezing & increased shortness of breath     COVID/flu/RSV negative  Currently saturating in the mid 90s on 3 L(baseline)  chest x-ray WNL, age-adjusted D-dimer WNL  CTA PE study report noted negative for pulmonary embolism.  procalcitonin neg    Symptoms are significantly improved.  Currently O2 saturation 98 to 99% on 2 to 3 L nasal cannula.  Decrease IV Solu-Medrol to 40 mg twice daily  Scheduled Xopenex / atrovent every 6 hours nebulizer treatment  Cw po Azithromycin(day3/3)  Continue home inhalers  Pulmonary recommendation appreciated.    Type 2 diabetes mellitus without complication, without long-term current use of insulin ([facility])  Assessment & Plan  Lab Results   Component Value Date    HGBA1C 7.3 (H) 08/16/2024       Recent Labs     08/19/24  2035 08/20/24  0642 08/20/24  1054 08/20/24  1556   POCGLU 368* 423* 385* 240*         Blood Sugar Average: Last 72 hrs:  (P) 297.6    Currently fingerstick glucose noted elevated due to IV Solu-Medrol.  Blood glucose checks 4 times daily with sliding scale coverage, hypoglycemia protocol  Lantus increased to 7 units units Qam, increase lispro to 7 units 3 times daily with meals.  Holding trulicity while inpatient.  Continue to adjust insulin as per Accu-Cheks monitoring and sliding scale coverage.      Asthma  Assessment & Plan  This is a chronic condition  Reports compliance with inhalers  Follows up with pulmonologist and PCP       Chronic respiratory failure (HCC)  Assessment & Plan  On 3 LPM via NC at home   Currently at baseline.      Hypertension  Assessment & Plan  Presenting with elevated systolic blood pressure in the 190s, patient reports her  baseline is in the 140s to 170s systolic  Continue home Cozaar  Currently mostly controlled.  as needed IV labetalol with parameters                 VTE Pharmacologic Prophylaxis: VTE Score: 4 Moderate Risk (Score 3-4) - Pharmacological DVT Prophylaxis Ordered: enoxaparin (Lovenox).    Mobility:   Basic Mobility Inpatient Raw Score: 21  JH-HLM Goal: 6: Walk 10 steps or more  JH-HLM Achieved: 7: Walk 25 feet or more      Patient Centered Rounds: I performed bedside rounds with nursing staff today.   Discussions with Specialists or Other Care Team Provider: Pulmonology recommendation appreciated.  Total Time Spent on Date of Encounter in care of patient: 35 mins. This time was spent on one or more of the following: performing physical exam; counseling and coordination of care; obtaining or reviewing history; documenting in the medical record; reviewing/ordering tests, medications or procedures; communicating with other healthcare professionals and discussing with patient's family/caregivers.    Current Length of Stay: 3 day(s)  Current Patient Status: Inpatient   Certification Statement: The patient will continue to require additional inpatient hospital stay due to COPD exacerbation  Discharge Plan: Anticipate discharge in 24-48 hrs to discharge location to be determined pending rehab evaluations.    Code Status: Level 1 - Full Code    Subjective:   Patient appears comfortable not in distress.  Reports that she is feeling better overall however not ready to go home yet.  Currently she denies any new complaints.  No other events reported.    Objective:     Vitals:   Temp (24hrs), Av.1 °F (36.7 °C), Min:97.7 °F (36.5 °C), Max:98.3 °F (36.8 °C)    Temp:  [97.7 °F (36.5 °C)-98.3 °F (36.8 °C)] 98.2 °F (36.8 °C)  HR:  [105-129] 108  Resp:  [17-20] 20  BP: (118-172)/() 122/74  SpO2:  [92 %-98 %] 98 %  Body mass index is 24.84 kg/m².     Input and Output Summary (last 24 hours):     Intake/Output Summary (Last 24  hours) at 8/20/2024 1614  Last data filed at 8/20/2024 1151  Gross per 24 hour   Intake 240 ml   Output 0 ml   Net 240 ml       Physical Exam:   Physical Exam  Constitutional:       General: She is not in acute distress.     Appearance: Normal appearance. She is not ill-appearing, toxic-appearing or diaphoretic.   HENT:      Head: Atraumatic.   Cardiovascular:      Rate and Rhythm: Normal rate.      Pulses: Normal pulses.   Pulmonary:      Effort: Pulmonary effort is normal. No respiratory distress.      Breath sounds: Wheezing present.      Comments: Currently O2 saturation noted 98 to 99% on 2 to 3 L nasal cannula.  Abdominal:      General: Bowel sounds are normal. There is no distension.      Palpations: Abdomen is soft.      Tenderness: There is no abdominal tenderness.   Musculoskeletal:      Cervical back: Neck supple.      Right lower leg: No edema.      Left lower leg: No edema.   Neurological:      Mental Status: She is alert and oriented to person, place, and time. Mental status is at baseline.   Psychiatric:         Mood and Affect: Mood normal.         Behavior: Behavior normal.          Additional Data:     Labs:  Results from last 7 days   Lab Units 08/20/24  0532 08/18/24  0438   WBC Thousand/uL 14.49* 16.64*   HEMOGLOBIN g/dL 12.4 12.7   HEMATOCRIT % 39.6 39.4   PLATELETS Thousands/uL 256 243   BANDS PCT % 2  --    SEGS PCT %  --  87*   LYMPHO PCT % 5* 7*   MONO PCT % 5 4   EOS PCT % 0 0     Results from last 7 days   Lab Units 08/20/24  0532 08/18/24  0438 08/16/24  2125   SODIUM mmol/L 138   < > 140   POTASSIUM mmol/L 3.7   < > 4.0   CHLORIDE mmol/L 103   < > 105   CO2 mmol/L 28   < > 26   BUN mg/dL 22   < > 12   CREATININE mg/dL 0.61   < > 0.55*   ANION GAP mmol/L 7   < > 9   CALCIUM mg/dL 8.4   < > 9.9   ALBUMIN g/dL  --   --  4.0   TOTAL BILIRUBIN mg/dL  --   --  0.44   ALK PHOS U/L  --   --  55   ALT U/L  --   --  20   AST U/L  --   --  12*   GLUCOSE RANDOM mg/dL 473*   < > 210*    < > = values  in this interval not displayed.         Results from last 7 days   Lab Units 08/20/24  1556 08/20/24  1054 08/20/24  0642 08/19/24  2035 08/19/24  1550 08/19/24  1037 08/19/24  0619 08/18/24  2042 08/18/24  1617 08/18/24  1038 08/18/24  0630 08/17/24 2042   POC GLUCOSE mg/dl 240* 385* 423* 368* 182* 375* 267* 302* 222* 363* 243* 324*     Results from last 7 days   Lab Units 08/16/24  2125   HEMOGLOBIN A1C % 7.3*     Results from last 7 days   Lab Units 08/18/24  0438   PROCALCITONIN ng/ml 0.09       Lines/Drains:  Invasive Devices       Peripheral Intravenous Line  Duration             Peripheral IV 08/20/24 Dorsal (posterior);Right Forearm <1 day                          Imaging: Reviewed radiology reports from this admission including: CTA PE study report noted.    Recent Cultures (last 7 days):         Last 24 Hours Medication List:   Current Facility-Administered Medications   Medication Dose Route Frequency Provider Last Rate    acetaminophen  650 mg Oral Q6H PRN Carine Bojorquez PA-C      aluminum-magnesium hydroxide-simethicone  30 mL Oral Q6H PRN Carine Bojorquez PA-C      budesonide-formoterol  2 puff Inhalation BID Carine Bojorquez PA-C      docusate sodium  100 mg Oral BID Carine Bojorquez PA-C      enoxaparin  40 mg Subcutaneous Daily Carine Bojorquez PA-C      esomeprazole  20 mg Oral Early Morning Umang Grajeda DO      fluticasone  1 spray Each Nare BID Carine Bojorquez PA-C      guaiFENesin  1,200 mg Oral Q12H [facility] Osmin Metzger MD      [START ON 8/21/2024] insulin glargine  7 Units Subcutaneous QAM Lukasz PEREZ MD      insulin lispro  1-5 Units Subcutaneous TID AC Carine Bojorquez PA-C      insulin lispro  1-5 Units Subcutaneous HS Carine Bojorquez PA-C      insulin lispro  7 Units Subcutaneous TID With Meals Lukasz PEREZ MD      ipratropium  0.5 mg Nebulization Q6H Umang Grajeda DO      ipratropium-albuterol  3 mL Nebulization Q4H PRN NEEMA Charlton      labetalol  10 mg  Intravenous Q4H PRN Carine Bojorquez PA-C      levalbuterol  1.25 mg Nebulization Q6H Umang Grajeda DO      losartan  50 mg Oral Daily Osmin Metzger MD      methylPREDNISolone sodium succinate  40 mg Intravenous Q12H [facility] Lukasz PEREZ MD      montelukast  10 mg Oral Daily Carine Bojorquez PA-C      nicotine  1 patch Transdermal Daily Carine Bojorquez PA-C      temazepam  30 mg Oral HS PRN Carine Bojorquez PA-C          Today, Patient Was Seen By: Lukasz Goodrich MD    **Please Note: This note may have been constructed using a voice recognition system.**

## 2024-08-20 NOTE — ASSESSMENT & PLAN NOTE
C/c : Worsening wheezing & increased shortness of breath     COVID/flu/RSV negative  Currently saturating in the mid 90s on 3 L(baseline)  chest x-ray WNL, age-adjusted D-dimer WNL  CTA PE study report noted negative for pulmonary embolism.  procalcitonin neg    Symptoms are significantly improved.  Currently O2 saturation 98 to 99% on 2 to 3 L nasal cannula.  Decrease IV Solu-Medrol to 40 mg twice daily  Scheduled Xopenex / atrovent every 6 hours nebulizer treatment  Cw po Azithromycin(day3/3)  Continue home inhalers  Pulmonary recommendation appreciated.

## 2024-08-20 NOTE — ASSESSMENT & PLAN NOTE
Presenting with elevated systolic blood pressure in the 190s, patient reports her baseline is in the 140s to 170s systolic  Continue home Cozaar  Currently mostly controlled.  as needed IV labetalol with parameters

## 2024-08-20 NOTE — PROGRESS NOTES
Progress Note - Pulmonary   Rebekah I Adonis 69 y.o. female MRN: [id number]  Unit/Bed#: -01 Encounter: [id number]    Assessment/Plan:    Chronic hypoxic respiratory failure  -92% on 3 L, patient wears 3 L supplemental O2 at home  -Continue to maintain saturation greater than 89%  -Pulmonary hygiene recommended: Deep breathing with cough, OOB as tolerated, incentive spirometry and flutter valve  -Patient may benefit from pulmonary rehab outpatient    Asthma/COPD with acute exacerbation  -No formal PFTs on file  -Home medication regime: Symbicort 160-4.5 mcg/ACT 2 puffs twice daily, Atrovent HFA 17 mcg/ACT 2 puffs 4 times daily, Xopenex 45 mcg/ACT, prednisone 20 mg daily  -Continue Symbicort, Atrovent and Xopenex while inpatient  -Currently awaiting approval for Tezspire  -Azithromycin day # 3  -Solu-Medrol 40 mg Q8, okay to decrease to every 12 starting today  -Will need outpatient follow-up when discharged    Cough  -Continue Mucinex and Singulair  -Continue nebulizers and IV Solu-Medrol      Chief Complaint:    I did not sleep well    Subjective:    Patient seen and examined at bedside.  Found thing up comfortably in bed.  Endorses difficulty sleeping overnight feeling restless and she pulled out her IVs.  She does not appear in any respiratory distress at this time.  No fever chills night sweats chest pain or hemoptysis noted    Objective:    Vitals: Blood pressure (!) 172/110, pulse (!) 129, temperature 98.3 °F (36.8 °C), resp. rate 20, height 5' (1.524 m), weight 57.7 kg (127 lb 3.3 oz), SpO2 92%.3 L,Body mass index is 24.84 kg/m².      Intake/Output Summary (Last 24 hours) at 8/20/2024 0908  Last data filed at 8/20/2024 0308  Gross per 24 hour   Intake 360 ml   Output 0 ml   Net 360 ml       Invasive Devices       Peripheral Intravenous Line  Duration             Peripheral IV 08/20/24 Dorsal (posterior);Right Forearm <1 day                    Physical Exam:     Physical Exam  Vitals reviewed.  "  Constitutional:       General: She is not in acute distress.     Appearance: She is not ill-appearing.   HENT:      Head: Normocephalic and atraumatic.      Right Ear: External ear normal.      Left Ear: External ear normal.      Nose: Nose normal.      Mouth/Throat:      Mouth: Mucous membranes are moist.      Pharynx: Oropharynx is clear.   Eyes:      Extraocular Movements: Extraocular movements intact.      Pupils: Pupils are equal, round, and reactive to light.   Cardiovascular:      Rate and Rhythm: Normal rate and regular rhythm.      Pulses: Normal pulses.      Heart sounds: Normal heart sounds.   Pulmonary:      Effort: Pulmonary effort is normal.      Breath sounds: Wheezing present.      Comments: Expiratory wheezes  Abdominal:      General: Bowel sounds are normal.   Musculoskeletal:         General: Normal range of motion.      Cervical back: Normal range of motion and neck supple.      Right lower leg: No edema.      Left lower leg: No edema.   Skin:     General: Skin is warm and dry.   Neurological:      Mental Status: She is alert and oriented to person, place, and time.   Psychiatric:         Mood and Affect: Mood normal.         Behavior: Behavior normal.         Thought Content: Thought content normal.         Judgment: Judgment normal.         Labs: I have personally reviewed pertinent lab results., ABG: No results found for: \"PHART\", \"QRR5PQA\", \"PO2ART\", \"FKC8RHR\", \"E9MBUWRQ\", \"BEART\", \"SOURCE\", BNP: No results found for: \"BNP\", CBC:   Lab Results   Component Value Date    WBC 14.49 (H) 08/20/2024    HGB 12.4 08/20/2024    HCT 39.6 08/20/2024    MCV 91 08/20/2024     08/20/2024    RBC 4.36 08/20/2024    MCH 28.4 08/20/2024    MCHC 31.3 (L) 08/20/2024    RDW 14.2 08/20/2024    MPV 9.4 08/20/2024   , CMP:   Lab Results   Component Value Date    SODIUM 138 08/20/2024    K 3.7 08/20/2024     08/20/2024    CO2 28 08/20/2024    BUN 22 08/20/2024    CREATININE 0.61 08/20/2024    CALCIUM " 8.4 08/20/2024    EGFR 92 08/20/2024       Imaging and other studies: I have personally reviewed pertinent reports.    CTA chest PE study 08/17/2024 shows no pulmonary embolism identified within the limitations of suboptimal bolus.  A few scattered bilateral interstitial densities, nonspecific.  Otherwise clear lungs no pleural or pericardial effusion

## 2024-08-20 NOTE — PLAN OF CARE
Problem: RESPIRATORY - ADULT  Goal: Achieves optimal ventilation and oxygenation  Description: INTERVENTIONS:  - Assess for changes in respiratory status  - Assess for changes in mentation and behavior  - Position to facilitate oxygenation and minimize respiratory effort  - Oxygen administered by appropriate delivery if ordered  - Initiate smoking cessation education as indicated  - Encourage broncho-pulmonary hygiene including cough, deep breathe, Incentive Spirometry  - Assess the need for suctioning and aspirate as needed  - Assess and instruct to report SOB or any respiratory difficulty  - Respiratory Therapy support as indicated  Outcome: Progressing     Problem: METABOLIC, FLUID AND ELECTROLYTES - ADULT  Goal: Glucose maintained within target range  Description: INTERVENTIONS:  - Monitor Blood Glucose as ordered  - Assess for signs and symptoms of hyperglycemia and hypoglycemia  - Administer ordered medications to maintain glucose within target range  - Assess nutritional intake and initiate nutrition service referral as needed  Outcome: Progressing

## 2024-08-20 NOTE — PLAN OF CARE
Problem: RESPIRATORY - ADULT  Goal: Achieves optimal ventilation and oxygenation  Description: INTERVENTIONS:  - Assess for changes in respiratory status  - Assess for changes in mentation and behavior  - Position to facilitate oxygenation and minimize respiratory effort  - Oxygen administered by appropriate delivery if ordered  - Initiate smoking cessation education as indicated  - Encourage broncho-pulmonary hygiene including cough, deep breathe, Incentive Spirometry  - Assess the need for suctioning and aspirate as needed  - Assess and instruct to report SOB or any respiratory difficulty  - Respiratory Therapy support as indicated  Outcome: Progressing     Problem: METABOLIC, FLUID AND ELECTROLYTES - ADULT  Goal: Glucose maintained within target range  Description: INTERVENTIONS:  - Monitor Blood Glucose as ordered  - Assess for signs and symptoms of hyperglycemia and hypoglycemia  - Administer ordered medications to maintain glucose within target range  - Assess nutritional intake and initiate nutrition service referral as needed  Outcome: Progressing     Problem: Nutrition/Hydration-ADULT  Goal: Nutrient/Hydration intake appropriate for improving, restoring or maintaining nutritional needs  Description: Monitor and assess patient's nutrition/hydration status for malnutrition. Collaborate with interdisciplinary team and initiate plan and interventions as ordered.  Monitor patient's weight and dietary intake as ordered or per policy. Utilize nutrition screening tool and intervene as necessary. Determine patient's food preferences and provide high-protein, high-caloric foods as appropriate.     INTERVENTIONS:  - Monitor oral intake, urinary output, labs, and treatment plans  - Assess nutrition and hydration status and recommend course of action  - Evaluate amount of meals eaten  - Assist patient with eating if necessary   - Allow adequate time for meals  - Recommend/ encourage appropriate diets, oral nutritional  supplements, and vitamin/mineral supplements  - Order, calculate, and assess calorie counts as needed  - Recommend, monitor, and adjust tube feedings and TPN/PPN based on assessed needs  - Assess need for intravenous fluids  - Provide specific nutrition/hydration education as appropriate  - Include patient/family/caregiver in decisions related to nutrition  Outcome: Progressing

## 2024-08-20 NOTE — CASE MANAGEMENT
Case Management Progress Note    Patient name Rebekah Lamb  Location /-01 MRN [id number]  : 1955 Date 2024       LOS (days): 3  Geometric Mean LOS (GMLOS) (days): 2.7  Days to GMLOS:-0.2        OBJECTIVE:        Current admission status: Inpatient  Preferred Pharmacy:   [id number] - [facility] [place] - [phone]  [facility] [place] [address]  Phone: [phone] Fax: [phone]    Primary Care Provider: Kenneth Wang MD    Primary Insurance: MEDICARE  Secondary Insurance: BLUE CROSS    PROGRESS NOTE:  As per SLIM rounds, pt will be dc with 24-48hrs. CM continues to follow.

## 2024-08-20 NOTE — ASSESSMENT & PLAN NOTE
Lab Results   Component Value Date    HGBA1C 7.3 (H) 08/16/2024       Recent Labs     08/19/24  2035 08/20/24  0642 08/20/24  1054 08/20/24  1556   POCGLU 368* 423* 385* 240*         Blood Sugar Average: Last 72 hrs:  (P) 297.6    Currently fingerstick glucose noted elevated due to IV Solu-Medrol.  Blood glucose checks 4 times daily with sliding scale coverage, hypoglycemia protocol  Lantus increased to 7 units units Qam, increase lispro to 7 units 3 times daily with meals.  Holding trulicity while inpatient.  Continue to adjust insulin as per Accu-Cheks monitoring and sliding scale coverage.

## 2024-08-21 VITALS
HEIGHT: 60 IN | RESPIRATION RATE: 16 BRPM | HEART RATE: 98 BPM | BODY MASS INDEX: 26.01 KG/M2 | WEIGHT: 132.5 LBS | DIASTOLIC BLOOD PRESSURE: 82 MMHG | TEMPERATURE: 98 F | OXYGEN SATURATION: 98 % | SYSTOLIC BLOOD PRESSURE: 150 MMHG

## 2024-08-21 LAB
ANION GAP SERPL CALCULATED.3IONS-SCNC: 5 MMOL/L (ref 4–13)
BUN SERPL-MCNC: 20 MG/DL (ref 5–25)
CALCIUM SERPL-MCNC: 8.2 MG/DL (ref 8.4–10.2)
CHLORIDE SERPL-SCNC: 104 MMOL/L (ref 96–108)
CO2 SERPL-SCNC: 31 MMOL/L (ref 21–32)
CREAT SERPL-MCNC: 0.63 MG/DL (ref 0.6–1.3)
ERYTHROCYTE [DISTWIDTH] IN BLOOD BY AUTOMATED COUNT: 14.2 % (ref 11.6–15.1)
GFR SERPL CREATININE-BSD FRML MDRD: 91 ML/MIN/1.73SQ M
GLUCOSE SERPL-MCNC: 260 MG/DL (ref 65–140)
GLUCOSE SERPL-MCNC: 342 MG/DL (ref 65–140)
GLUCOSE SERPL-MCNC: 349 MG/DL (ref 65–140)
HCT VFR BLD AUTO: 38.1 % (ref 34.8–46.1)
HGB BLD-MCNC: 11.9 G/DL (ref 11.5–15.4)
MCH RBC QN AUTO: 28.4 PG (ref 26.8–34.3)
MCHC RBC AUTO-ENTMCNC: 31.2 G/DL (ref 31.4–37.4)
MCV RBC AUTO: 91 FL (ref 82–98)
PLATELET # BLD AUTO: 249 THOUSANDS/UL (ref 149–390)
PMV BLD AUTO: 9.5 FL (ref 8.9–12.7)
POTASSIUM SERPL-SCNC: 4.3 MMOL/L (ref 3.5–5.3)
RBC # BLD AUTO: 4.19 MILLION/UL (ref 3.81–5.12)
SODIUM SERPL-SCNC: 140 MMOL/L (ref 135–147)
WBC # BLD AUTO: 10.76 THOUSAND/UL (ref 4.31–10.16)

## 2024-08-21 PROCEDURE — 80048 BASIC METABOLIC PNL TOTAL CA: CPT | Performed by: STUDENT IN AN ORGANIZED HEALTH CARE EDUCATION/TRAINING PROGRAM

## 2024-08-21 PROCEDURE — 94640 AIRWAY INHALATION TREATMENT: CPT

## 2024-08-21 PROCEDURE — 94664 DEMO&/EVAL PT USE INHALER: CPT

## 2024-08-21 PROCEDURE — 99239 HOSP IP/OBS DSCHRG MGMT >30: CPT | Performed by: STUDENT IN AN ORGANIZED HEALTH CARE EDUCATION/TRAINING PROGRAM

## 2024-08-21 PROCEDURE — 85027 COMPLETE CBC AUTOMATED: CPT | Performed by: STUDENT IN AN ORGANIZED HEALTH CARE EDUCATION/TRAINING PROGRAM

## 2024-08-21 PROCEDURE — 99232 SBSQ HOSP IP/OBS MODERATE 35: CPT | Performed by: INTERNAL MEDICINE

## 2024-08-21 PROCEDURE — 94760 N-INVAS EAR/PLS OXIMETRY 1: CPT

## 2024-08-21 PROCEDURE — 82948 REAGENT STRIP/BLOOD GLUCOSE: CPT

## 2024-08-21 RX ORDER — PREDNISONE 10 MG/1
TABLET ORAL
Qty: 63 TABLET | Refills: 0 | Status: SHIPPED | OUTPATIENT
Start: 2024-08-21 | End: 2024-09-08

## 2024-08-21 RX ORDER — PREDNISONE 10 MG/1
TABLET ORAL
Qty: 30 TABLET | Refills: 0 | Status: SHIPPED | OUTPATIENT
Start: 2024-08-21 | End: 2024-08-21

## 2024-08-21 RX ORDER — AZITHROMYCIN 250 MG/1
250 TABLET, FILM COATED ORAL EVERY 24 HOURS
Qty: 2 TABLET | Refills: 0 | Status: SHIPPED | OUTPATIENT
Start: 2024-08-21 | End: 2024-08-23

## 2024-08-21 RX ADMIN — MONTELUKAST 10 MG: 10 TABLET, FILM COATED ORAL at 08:23

## 2024-08-21 RX ADMIN — ENOXAPARIN SODIUM 40 MG: 40 INJECTION SUBCUTANEOUS at 08:23

## 2024-08-21 RX ADMIN — INSULIN GLARGINE 7 UNITS: 100 INJECTION, SOLUTION SUBCUTANEOUS at 08:23

## 2024-08-21 RX ADMIN — METHYLPREDNISOLONE SODIUM SUCCINATE 40 MG: 40 INJECTION, POWDER, FOR SOLUTION INTRAMUSCULAR; INTRAVENOUS at 08:23

## 2024-08-21 RX ADMIN — BUDESONIDE AND FORMOTEROL FUMARATE DIHYDRATE 2 PUFF: 160; 4.5 AEROSOL RESPIRATORY (INHALATION) at 08:24

## 2024-08-21 RX ADMIN — INSULIN LISPRO 7 UNITS: 100 INJECTION, SOLUTION INTRAVENOUS; SUBCUTANEOUS at 11:32

## 2024-08-21 RX ADMIN — IPRATROPIUM BROMIDE 0.5 MG: 0.5 SOLUTION RESPIRATORY (INHALATION) at 07:12

## 2024-08-21 RX ADMIN — INSULIN LISPRO 7 UNITS: 100 INJECTION, SOLUTION INTRAVENOUS; SUBCUTANEOUS at 08:27

## 2024-08-21 RX ADMIN — INSULIN LISPRO 2 UNITS: 100 INJECTION, SOLUTION INTRAVENOUS; SUBCUTANEOUS at 11:32

## 2024-08-21 RX ADMIN — LEVALBUTEROL HYDROCHLORIDE 1.25 MG: 1.25 SOLUTION RESPIRATORY (INHALATION) at 07:12

## 2024-08-21 RX ADMIN — LOSARTAN POTASSIUM 50 MG: 50 TABLET, FILM COATED ORAL at 08:23

## 2024-08-21 RX ADMIN — LEVALBUTEROL HYDROCHLORIDE 1.25 MG: 1.25 SOLUTION RESPIRATORY (INHALATION) at 01:29

## 2024-08-21 RX ADMIN — ESOMEPRAZOLE MAGNESIUM 20 MG: 20 CAPSULE, DELAYED RELEASE ORAL at 06:05

## 2024-08-21 RX ADMIN — GUAIFENESIN 1200 MG: 600 TABLET ORAL at 08:23

## 2024-08-21 RX ADMIN — DOCUSATE SODIUM 100 MG: 100 CAPSULE, LIQUID FILLED ORAL at 08:23

## 2024-08-21 RX ADMIN — IPRATROPIUM BROMIDE 0.5 MG: 0.5 SOLUTION RESPIRATORY (INHALATION) at 01:29

## 2024-08-21 RX ADMIN — INSULIN LISPRO 3 UNITS: 100 INJECTION, SOLUTION INTRAVENOUS; SUBCUTANEOUS at 08:23

## 2024-08-21 NOTE — RESPIRATORY THERAPY NOTE
08/21/24 0129   Respiratory Protocol   Protocol Initiated? No   Protocol Selection Respiratory   Language Barrier? No   Medical & Social History Reviewed? Yes   Diagnostic Studies Reviewed? Yes   Physical Assessment Performed? Yes   Respiratory Plan Mild Distress pathway   Respiratory Assessment   General Appearance Drowsy   Respiratory Pattern Normal   Chest Assessment Chest expansion symmetrical   Bilateral Breath Sounds Diminished;Expiratory wheezes   Cough None   Resp Comments respirations are even and non labored   O2 Device 2L o2 nc   Additional Assessments   SpO2 95 %

## 2024-08-21 NOTE — DISCHARGE INSTR - AVS FIRST PAGE
Recommend close follow-up with primary care provider within 1 week of discharge.  Recommended close outpatient follow-up with primary pulmonology.

## 2024-08-21 NOTE — ASSESSMENT & PLAN NOTE
Lab Results   Component Value Date    HGBA1C 7.3 (H) 08/16/2024       Recent Labs     08/20/24  1556 08/20/24  2037 08/21/24  0624 08/21/24  1038   POCGLU 240* 219* 342* 260*         Blood Sugar Average: Last 72 hrs:  (P) 299.4345232026822290    Currently fingerstick glucose noted elevated due to IV Solu-Medrol.  Blood glucose checks 4 times daily with sliding scale coverage, hypoglycemia protocol  Lantus increased to 7 units units Qam, increase lispro to 7 units 3 times daily with meals.  Holding trulicity while inpatient.  Continue to adjust insulin as per Accu-Cheks monitoring and sliding scale coverage.

## 2024-08-21 NOTE — PLAN OF CARE
Problem: PAIN - ADULT  Goal: Verbalizes/displays adequate comfort level or baseline comfort level  Description: Interventions:  - Encourage patient to monitor pain and request assistance  - Assess pain using appropriate pain scale  - Administer analgesics based on type and severity of pain and evaluate response  - Implement non-pharmacological measures as appropriate and evaluate response  - Consider cultural and social influences on pain and pain management  - Notify physician/advanced practitioner if interventions unsuccessful or patient reports new pain  8/21/2024 1116 by Deonna Fried RN  Outcome: Progressing  8/21/2024 0739 by Deonna Fried RN  Outcome: Progressing

## 2024-08-21 NOTE — RESPIRATORY THERAPY NOTE
RT Protocol Note  Rebekah Lamb 69 y.o. female MRN: [id number]  Unit/Bed#: -01 Encounter: [id number]    Assessment    Principal Problem:    Acute exacerbation of chronic obstructive pulmonary disease (HCC)  Active Problems:    Hypertension    Chronic respiratory failure (HCC)    Asthma    Type 2 diabetes mellitus without complication, without long-term current use of insulin (HCC)    Physical Exam:   Assessment Type: Post-treatment  General Appearance: Alert, Awake  Respiratory Pattern: Normal  Chest Assessment: Chest expansion symmetrical  Bilateral Breath Sounds: Diminished, Expiratory wheezes  Cough: None  O2 Device: nc    Vitals:  Blood pressure 150/82, pulse 98, temperature 98 °F (36.7 °C), resp. rate 16, height 5' (1.524 m), weight 57.7 kg (127 lb 3.3 oz), SpO2 98%.      O2 Device: nc     Plan    Respiratory Plan: Mild Distress pathway        Resp Comments: pt tolerated tx well. BBS ex wheezes, will cont w current orders of q6 txs

## 2024-08-21 NOTE — PROGRESS NOTES
Progress Note - Pulmonary   Rebekah Lamb 69 y.o. female MRN: [id number]  Unit/Bed#: -01 Encounter: [id number]    Assessment:  Rebekah Lamb is a 69 y.o. female hx severe asthma/COPD on chronic prednisone, chronic hypoxia on 3L home O2, DM2, HTN presenting with Sob found to have acute exacerbation of asthma/COPD.      Plan:   Asthma/COPD with acute exacerbation  Chronic hypoxic resp failure  Dyspnea  Cough  Severe asthma/COPD on chronic prednisone (20mg) with poorly controled symptoms presenting with acute exacerbation likely in setting of viral trigger. CT chest without significant abnormalities or PE.   Currently on home 3L  Overall breathing and wheezing improving though still with some mild wheezing     Continue IV steroids solumedrol 40 BID today, can transition to prednisone 60mg down by 10mg every 3 days  Standing nebs q6hr  azithromycin   Continue home symbicort and singulair  Nicotine patch  Continue supplemental O2 for sat goal > 88%  Will need to follow up with [facility] pulm      Chief Complaint:    I feel better    Subjective:    Breathing continues to improve but still with some cough and wheezing    Objective:    Vitals: Blood pressure 150/82, pulse 98, temperature 98 °F (36.7 °C), resp. rate 16, height 5' (1.524 m), weight 60.1 kg (132 lb 7.9 oz), SpO2 98%.3 L,Body mass index is 25.88 kg/m².      Intake/Output Summary (Last 24 hours) at 8/21/2024 1336  Last data filed at 8/21/2024 0818  Gross per 24 hour   Intake 480 ml   Output --   Net 480 ml       Invasive Devices       Peripheral Intravenous Line  Duration             Peripheral IV 08/20/24 Dorsal (posterior);Right Forearm 1 day                    Physical Exam:     Physical Exam  Vitals reviewed.   Constitutional:       General: She is not in acute distress.     Appearance: She is not ill-appearing.   HENT:      Head: Normocephalic and atraumatic.      Right Ear: External ear normal.      Left Ear: External ear normal.      Nose: Nose normal.       "Mouth/Throat:      Mouth: Mucous membranes are moist.      Pharynx: Oropharynx is clear.   Eyes:      Extraocular Movements: Extraocular movements intact.      Pupils: Pupils are equal, round, and reactive to light.   Cardiovascular:      Rate and Rhythm: Normal rate and regular rhythm.      Pulses: Normal pulses.      Heart sounds: Normal heart sounds.   Pulmonary:      Effort: Pulmonary effort is normal.      Breath sounds: Wheezing present.      Comments: Expiratory wheezes  Abdominal:      General: Bowel sounds are normal.   Musculoskeletal:         General: Normal range of motion.      Cervical back: Normal range of motion and neck supple.      Right lower leg: No edema.      Left lower leg: No edema.   Skin:     General: Skin is warm and dry.   Neurological:      Mental Status: She is alert and oriented to person, place, and time.   Psychiatric:         Mood and Affect: Mood normal.         Behavior: Behavior normal.         Thought Content: Thought content normal.         Judgment: Judgment normal.         Labs: I have personally reviewed pertinent lab results., ABG: No results found for: \"PHART\", \"EEZ2FGV\", \"PO2ART\", \"VXU0UIV\", \"R0VYWEVI\", \"BEART\", \"SOURCE\", BNP: No results found for: \"BNP\", CBC:   Lab Results   Component Value Date    WBC 10.76 (H) 08/21/2024    HGB 11.9 08/21/2024    HCT 38.1 08/21/2024    MCV 91 08/21/2024     08/21/2024    RBC 4.19 08/21/2024    MCH 28.4 08/21/2024    MCHC 31.2 (L) 08/21/2024    RDW 14.2 08/21/2024    MPV 9.5 08/21/2024   , CMP:   Lab Results   Component Value Date    SODIUM 140 08/21/2024    K 4.3 08/21/2024     08/21/2024    CO2 31 08/21/2024    BUN 20 08/21/2024    CREATININE 0.63 08/21/2024    CALCIUM 8.2 (L) 08/21/2024    EGFR 91 08/21/2024       Imaging and other studies: I have personally reviewed pertinent reports.    CTA chest PE study 08/17/2024 shows no pulmonary embolism identified within the limitations of suboptimal bolus.  A few scattered " bilateral interstitial densities, nonspecific.  Otherwise clear lungs no pleural or pericardial effusion

## 2024-08-21 NOTE — DISCHARGE SUMMARY
[facility]  Discharge- Rebekah I [place] 1955, 69 y.o. female MRN: [id number]  Unit/Bed#: MS [place] Encounter: [id number]  Primary Care Provider: Kenneth Wang MD   Date and time admitted to hospital: 8/16/2024  9:01 PM    * Acute exacerbation of chronic obstructive pulmonary disease (HCC)  Assessment & Plan  C/c : Worsening wheezing & increased shortness of breath     COVID/flu/RSV negative  Currently saturating in the mid 90s on 3 L(baseline)  chest x-ray WNL, age-adjusted D-dimer WNL  CTA PE study report noted negative for pulmonary embolism.  procalcitonin neg    Symptoms have now resolved.  Currently O2 saturation 98 to 99% on 2 to 3 L nasal cannula which is at her baseline.  Discharging on p.o. azithromycin to 250 mg daily for 2 more days to complete total 5-day course.  Also being discharged on tapering dose of prednisone 60 mg daily with 10 mg taper every 3 days per pulmonology recommendation.  Continue home inhalers of Symbicort and Singulair.  Continue outpatient follow-up with primary care provider and primary pulmonology.  Current hemodynamically stable for discharge.    Type 2 diabetes mellitus without complication, without long-term current use of insulin ([facility])  Assessment & Plan  Lab Results   Component Value Date    HGBA1C 7.3 (H) 08/16/2024       Recent Labs     08/20/24  1556 08/20/24  2037 08/21/24  0624 08/21/24  1038   POCGLU 240* 219* 342* 260*         Blood Sugar Average: Last 72 hrs:  (P) [id number]    Resume home dose of Trulicity.  Close outpatient follow-up with PCP.      Asthma  Assessment & Plan  This is a chronic condition  Reports compliance with inhalers  Follows up with pulmonologist and PCP       Chronic respiratory failure (HCC)  Assessment & Plan  On 3 LPM via NC at home   Currently at baseline.      Hypertension  Assessment & Plan  Presenting with elevated systolic blood pressure in the 190s, patient reports her baseline is in the 140s to 170s  systolic  Continue home Cozaar  Close outpatient follow-up with PCP.          Medical Problems       Resolved Problems  Date Reviewed: 8/21/2024   None       Discharging Physician / Practitioner: Lukasz Goodrich MD  PCP: Kenneth Wang MD  Admission Date:   Admission Orders (From admission, onward)       Ordered        08/17/24 1446  INPATIENT ADMISSION  Once            08/16/24 2321  Place in Observation  Once                          Discharge Date: 08/21/24    Consultations During Hospital Stay:  Pulmonology      Reason for Admission: Acute COPD/asthma exacerbation.    Hospital Course:   Rebekah Lamb is a 69 y.o. female patient with past medical history of asthma, COPD, chronic home O2 dependent 3 L at baseline, chronic prednisone use, diabetes, hypertension, who originally presented to the hospital on 8/16/2024 due to shortness of breath and hospitalized due to exacerbation of asthma/COPD.  Was seen by pulmonology and recommended to continue treatment with IV Solu-Medrol, nebs, azithromycin and respiratory protocol.  Imaging including CTA PE study noted negative for acute finding for pulmonary embolism, noted with scattered bilateral interstitial densities nonspecific otherwise clear lungs.  Currently patient is doing much better and eager to go home.  Currently O2 saturation stable at her baseline on 2 to 3 L nasal cannula.  Also reports that now she is able to ambulate without getting dyspneic.  Cleared by pulmonology for discharge on azithromycin to complete 5-day course as well as tapering dose of prednisone 60 mg daily with 10 mg taper every 3 days.  Recommended close outpatient follow-up with PCP and primary pulmonology as well as to have repeat CT chest in 6 to 8 weeks to monitor evaluation.  No other events reported.  Currently she is hemodynamically stable for discharge.  Patient is in agreement with above-stated outpatient follow-up plan.  Refer to earlier notes for further clarification.        Please  see above list of diagnoses and related plan for additional information.     Condition at Discharge: good    Discharge Day Visit / Exam:   Subjective: Seen during a.m. rounds.  Patient appears comfortable not in distress.  Reports that she is able to ambulate without getting short of breath.  Currently she denies chest pain, dyspnea, nausea, vomiting, abdominal pain, any other new complaints.  Reports feeling much better and eager to go home.    Vitals: Blood Pressure: 150/82 (08/21/24 0728)  Pulse: 98 (08/21/24 0728)  Temperature: 98 °F (36.7 °C) (08/21/24 0728)  Temp Source: Oral (08/19/24 2141)  Respirations: 16 (08/21/24 0728)  Height: 5' (152.4 cm) (08/17/24 1139)  Weight - Scale: 60.1 kg (132 lb 7.9 oz) (08/21/24 1136)  SpO2: 98 % (08/21/24 0728)  Exam:   Physical Exam  Constitutional:       General: She is not in acute distress.     Appearance: Normal appearance. She is not ill-appearing, toxic-appearing or diaphoretic.   HENT:      Head: Atraumatic.   Cardiovascular:      Rate and Rhythm: Normal rate.      Pulses: Normal pulses.   Pulmonary:      Effort: Pulmonary effort is normal. No respiratory distress.      Comments: Currently O2 saturation noted 98 to 99% on 2 to 3 L nasal cannula.  Abdominal:      General: Bowel sounds are normal. There is no distension.      Palpations: Abdomen is soft.      Tenderness: There is no abdominal tenderness.   Musculoskeletal:      Cervical back: Neck supple.      Right lower leg: No edema.      Left lower leg: No edema.   Neurological:      Mental Status: She is alert and oriented to person, place, and time. Mental status is at baseline.   Psychiatric:         Mood and Affect: Mood normal.         Behavior: Behavior normal.            Discharge instructions/Information to patient and family:   See after visit summary for information provided to patient and family.      Provisions for Follow-Up Care:  See after visit summary for information related to follow-up care and  any pertinent home health orders.      Mobility at time of Discharge:   Basic Mobility Inpatient Raw Score: 21  JH-HLM Goal: 6: Walk 10 steps or more  JH-HLM Achieved: 7: Walk 25 feet or more       Disposition:   Home    Planned Readmission:      Discharge Statement:  I spent 35 minutes discharging the patient. This time was spent on the day of discharge. I had direct contact with the patient on the day of discharge. Greater than 50% of the total time was spent examining patient, answering all patient questions, arranging and discussing plan of care with patient as well as directly providing post-discharge instructions.  Additional time then spent on discharge activities.    Discharge Medications:  See after visit summary for reconciled discharge medications provided to patient and/or family.      **Please Note: This note may have been constructed using a voice recognition system**

## 2024-08-23 ENCOUNTER — PATIENT OUTREACH (OUTPATIENT)
Dept: CASE MANAGEMENT | Facility: OTHER | Age: 69
End: 2024-08-23

## 2024-08-23 DIAGNOSIS — J44.1 ACUTE EXACERBATION OF CHRONIC OBSTRUCTIVE PULMONARY DISEASE (HCC): Primary | ICD-10-CM

## 2024-08-23 NOTE — PROGRESS NOTES
.OP RT CM received in basket message. Patient recently discharged and was hospitalized with COPD exacerbation. Referral placed.  OP RT CM will outreach patient later today.    ________________________________  .OP RT CM called and spoke with patient regarding their breathing, medications and O2.  Rebekah is wearing 3lpm nasal cannula.  She does not have a pulse ox. She is using Symbicort 2 puffs BID and rinsing out mouth, as well as Xopenex/NSS nebulizer TID and Atrovent inhaler TID. She has a NPC.     She stated she did not receive COPD booklet/zone tools. Will sent to her home. Address confirmed.     She stated she is smoke free. We discussed [facility] smoking cessation  if she has needs in the future. No PFT's on file. Rebekah's doctors are mostly thru [facility]. She has made f/u appt.     Rebekah agreed for future outreaches and accepted my contact information. OP RT CM will outreach next week.

## 2024-08-30 ENCOUNTER — PATIENT OUTREACH (OUTPATIENT)
Dept: CASE MANAGEMENT | Facility: OTHER | Age: 69
End: 2024-08-30

## (undated) DEVICE — COBAN 2 IN UNSTERILE

## (undated) DEVICE — INTENDED FOR TISSUE SEPARATION, AND OTHER PROCEDURES THAT REQUIRE A SHARP SURGICAL BLADE TO PUNCTURE OR CUT.: Brand: BARD-PARKER ® CARBON RIB-BACK BLADES

## (undated) DEVICE — ACE WRAP 3 IN STERILE

## (undated) DEVICE — STERILE BETHLEHEM PLASTIC HAND: Brand: CARDINAL HEALTH

## (undated) DEVICE — GLOVE SRG BIOGEL 8

## (undated) DEVICE — SPLINT 4 X 15 IN FAST SET PLASTER

## (undated) DEVICE — SUT ETHILON 4-0 PS-2 18 IN 1667H

## (undated) DEVICE — BRUSH EZ SCRUB PCMX W/NAIL CLEANER

## (undated) DEVICE — NEEDLE BLUNT 18 G X 1 1/2IN

## (undated) DEVICE — BANDAGE, ESMARK LF STR 6"X9' (20/CS): Brand: CYPRESS

## (undated) DEVICE — TUBING SUCTION 5MM X 12 FT

## (undated) DEVICE — LIGHT HANDLE COVER SLEEVE DISP BLUE STELLAR

## (undated) DEVICE — SYRINGE 10ML LL

## (undated) DEVICE — CURITY STRETCH BANDAGE: Brand: CURITY

## (undated) DEVICE — NEEDLE 25G X 1 1/2

## (undated) DEVICE — PAD CAST 3 IN COTTON NON STRL

## (undated) DEVICE — DISPOSABLE EQUIPMENT COVER: Brand: SMALL TOWEL DRAPE

## (undated) DEVICE — GAUZE SPONGES,16 PLY: Brand: CURITY

## (undated) DEVICE — CURITY NON-ADHERENT STRIPS: Brand: CURITY

## (undated) DEVICE — SUT ETHIBOND 0 SH 30 IN X834H